# Patient Record
Sex: FEMALE | Race: WHITE | Employment: FULL TIME | URBAN - NONMETROPOLITAN AREA
[De-identification: names, ages, dates, MRNs, and addresses within clinical notes are randomized per-mention and may not be internally consistent; named-entity substitution may affect disease eponyms.]

---

## 2017-06-23 RX ORDER — ALPRAZOLAM 0.25 MG/1
0.25 TABLET ORAL PRN
Qty: 60 TABLET | Refills: 0 | Status: SHIPPED | OUTPATIENT
Start: 2017-06-23 | End: 2017-11-07 | Stop reason: SDUPTHER

## 2017-09-14 ENCOUNTER — OFFICE VISIT (OUTPATIENT)
Dept: FAMILY MEDICINE CLINIC | Age: 47
End: 2017-09-14
Payer: COMMERCIAL

## 2017-09-14 VITALS
HEART RATE: 72 BPM | DIASTOLIC BLOOD PRESSURE: 64 MMHG | HEIGHT: 67 IN | BODY MASS INDEX: 25.43 KG/M2 | WEIGHT: 162 LBS | SYSTOLIC BLOOD PRESSURE: 110 MMHG

## 2017-09-14 DIAGNOSIS — R07.9 CHEST PAIN, UNSPECIFIED TYPE: Primary | ICD-10-CM

## 2017-09-14 PROCEDURE — 93000 ELECTROCARDIOGRAM COMPLETE: CPT | Performed by: FAMILY MEDICINE

## 2017-09-14 PROCEDURE — 99213 OFFICE O/P EST LOW 20 MIN: CPT | Performed by: FAMILY MEDICINE

## 2017-09-14 ASSESSMENT — ENCOUNTER SYMPTOMS
GASTROINTESTINAL NEGATIVE: 1
RESPIRATORY NEGATIVE: 1
EYES NEGATIVE: 1
ALLERGIC/IMMUNOLOGIC NEGATIVE: 1

## 2017-09-14 ASSESSMENT — PATIENT HEALTH QUESTIONNAIRE - PHQ9
SUM OF ALL RESPONSES TO PHQ9 QUESTIONS 1 & 2: 0
SUM OF ALL RESPONSES TO PHQ QUESTIONS 1-9: 0
2. FEELING DOWN, DEPRESSED OR HOPELESS: 0
1. LITTLE INTEREST OR PLEASURE IN DOING THINGS: 0

## 2017-11-07 ENCOUNTER — OFFICE VISIT (OUTPATIENT)
Dept: FAMILY MEDICINE CLINIC | Age: 47
End: 2017-11-07
Payer: COMMERCIAL

## 2017-11-07 ENCOUNTER — HOSPITAL ENCOUNTER (OUTPATIENT)
Age: 47
Setting detail: SPECIMEN
Discharge: HOME | End: 2017-11-07
Payer: COMMERCIAL

## 2017-11-07 VITALS
HEART RATE: 68 BPM | SYSTOLIC BLOOD PRESSURE: 110 MMHG | WEIGHT: 162.04 LBS | DIASTOLIC BLOOD PRESSURE: 64 MMHG | HEIGHT: 67 IN | BODY MASS INDEX: 25.43 KG/M2

## 2017-11-07 DIAGNOSIS — Z12.4 PAP SMEAR FOR CERVICAL CANCER SCREENING: Primary | ICD-10-CM

## 2017-11-07 DIAGNOSIS — Z11.51 SCREENING FOR HUMAN PAPILLOMAVIRUS: ICD-10-CM

## 2017-11-07 DIAGNOSIS — Z11.51 SCREENING FOR HUMAN PAPILLOMAVIRUS: Primary | ICD-10-CM

## 2017-11-07 PROCEDURE — G0145 SCR C/V CYTO,THINLAYER,RESCR: HCPCS

## 2017-11-07 PROCEDURE — 99396 PREV VISIT EST AGE 40-64: CPT | Performed by: FAMILY MEDICINE

## 2017-11-07 RX ORDER — ALPRAZOLAM 0.25 MG/1
0.25 TABLET ORAL PRN
Qty: 60 TABLET | Refills: 0 | Status: SHIPPED | OUTPATIENT
Start: 2017-11-07 | End: 2018-04-25 | Stop reason: SDUPTHER

## 2017-11-07 ASSESSMENT — ENCOUNTER SYMPTOMS
RESPIRATORY NEGATIVE: 1
EYES NEGATIVE: 1
ALLERGIC/IMMUNOLOGIC NEGATIVE: 1
BACK PAIN: 1
GASTROINTESTINAL NEGATIVE: 1

## 2017-11-07 NOTE — PROGRESS NOTES
back pain (acute, this am). Skin: Negative. Allergic/Immunologic: Negative. Neurological: Negative. Hematological: Negative. Psychiatric/Behavioral: Negative. Objective:   Physical Exam   Constitutional: She is oriented to person, place, and time. She appears well-developed and well-nourished. No distress. HENT:   Head: Normocephalic and atraumatic. Right Ear: External ear normal.   Left Ear: External ear normal.   Mouth/Throat: Oropharynx is clear and moist. No oropharyngeal exudate. Eyes: Conjunctivae and EOM are normal. No scleral icterus. Neck: Neck supple. No thyromegaly present. Cardiovascular: Normal rate, regular rhythm, normal heart sounds and intact distal pulses. No murmur heard. Pulmonary/Chest: Effort normal and breath sounds normal. No respiratory distress. She has no wheezes. Right breast exhibits tenderness. Right breast exhibits no inverted nipple, no mass, no nipple discharge and no skin change. Left breast exhibits tenderness. Left breast exhibits no inverted nipple, no mass, no nipple discharge and no skin change. Abdominal: Soft. Bowel sounds are normal. She exhibits no distension. There is no tenderness. There is no rebound. Genitourinary: Uterus normal. Vaginal discharge (light, white) found. Musculoskeletal: Normal range of motion. She exhibits no edema or tenderness. Neurological: She is alert and oriented to person, place, and time. Skin: Skin is warm and dry. No rash noted. No erythema. Psychiatric: She has a normal mood and affect. Her behavior is normal. Judgment and thought content normal.     /64 (Site: Right Arm, Position: Sitting, Cuff Size: Small Adult)   Pulse 68   Ht 5' 6.93\" (1.7 m)   Wt 162 lb 0.6 oz (73.5 kg)   BMI 25.43 kg/m²     Assessment:       updated pap and pelvic  Normal exam.  Pap pending.    Plan:      Annual exam.

## 2017-11-14 LAB — CYTOLOGY REPORT: NORMAL

## 2018-03-01 ENCOUNTER — PATIENT MESSAGE (OUTPATIENT)
Dept: FAMILY MEDICINE CLINIC | Age: 48
End: 2018-03-01

## 2018-03-01 RX ORDER — CIPROFLOXACIN 500 MG/1
500 TABLET, FILM COATED ORAL 2 TIMES DAILY
Qty: 20 TABLET | Refills: 0 | Status: SHIPPED | OUTPATIENT
Start: 2018-03-01 | End: 2018-03-11

## 2018-03-01 RX ORDER — OSELTAMIVIR PHOSPHATE 75 MG/1
75 CAPSULE ORAL 2 TIMES DAILY
Qty: 10 CAPSULE | Refills: 0 | Status: SHIPPED | OUTPATIENT
Start: 2018-03-01 | End: 2018-03-06

## 2018-04-25 DIAGNOSIS — F41.9 ANXIETY: Primary | ICD-10-CM

## 2018-04-25 RX ORDER — ALPRAZOLAM 0.25 MG/1
0.25 TABLET ORAL PRN
Qty: 60 TABLET | Refills: 0 | Status: SHIPPED | OUTPATIENT
Start: 2018-04-25 | End: 2019-03-21 | Stop reason: SDUPTHER

## 2018-06-18 ENCOUNTER — OFFICE VISIT (OUTPATIENT)
Dept: PRIMARY CARE CLINIC | Age: 48
End: 2018-06-18
Payer: COMMERCIAL

## 2018-06-18 ENCOUNTER — HOSPITAL ENCOUNTER (OUTPATIENT)
Dept: GENERAL RADIOLOGY | Age: 48
Discharge: HOME | End: 2018-06-20
Payer: COMMERCIAL

## 2018-06-18 VITALS
BODY MASS INDEX: 24.8 KG/M2 | DIASTOLIC BLOOD PRESSURE: 70 MMHG | HEART RATE: 80 BPM | OXYGEN SATURATION: 98 % | SYSTOLIC BLOOD PRESSURE: 110 MMHG | WEIGHT: 158 LBS

## 2018-06-18 DIAGNOSIS — M25.512 LEFT ANTERIOR SHOULDER PAIN: Primary | ICD-10-CM

## 2018-06-18 DIAGNOSIS — M25.512 LEFT ANTERIOR SHOULDER PAIN: ICD-10-CM

## 2018-06-18 PROCEDURE — 99213 OFFICE O/P EST LOW 20 MIN: CPT | Performed by: NURSE PRACTITIONER

## 2018-06-18 PROCEDURE — 73030 X-RAY EXAM OF SHOULDER: CPT

## 2018-06-18 PROCEDURE — MISC295 DJO ARM SLING WITH SWATHE: Performed by: NURSE PRACTITIONER

## 2018-06-18 ASSESSMENT — ENCOUNTER SYMPTOMS: RESPIRATORY NEGATIVE: 1

## 2018-08-07 ENCOUNTER — HOSPITAL ENCOUNTER (OUTPATIENT)
Dept: INTERVENTIONAL RADIOLOGY/VASCULAR | Age: 48
Discharge: HOME | End: 2018-08-09
Payer: COMMERCIAL

## 2018-08-07 ENCOUNTER — OFFICE VISIT (OUTPATIENT)
Dept: PRIMARY CARE CLINIC | Age: 48
End: 2018-08-07
Payer: COMMERCIAL

## 2018-08-07 VITALS
WEIGHT: 158 LBS | SYSTOLIC BLOOD PRESSURE: 90 MMHG | TEMPERATURE: 99 F | RESPIRATION RATE: 16 BRPM | BODY MASS INDEX: 24.8 KG/M2 | DIASTOLIC BLOOD PRESSURE: 60 MMHG | HEART RATE: 72 BPM | HEIGHT: 67 IN | OXYGEN SATURATION: 97 %

## 2018-08-07 DIAGNOSIS — M79.661 RIGHT CALF PAIN: ICD-10-CM

## 2018-08-07 DIAGNOSIS — I82.431 ACUTE DEEP VEIN THROMBOSIS (DVT) OF POPLITEAL VEIN OF RIGHT LOWER EXTREMITY (HCC): Primary | ICD-10-CM

## 2018-08-07 PROCEDURE — 93971 EXTREMITY STUDY: CPT

## 2018-08-07 PROCEDURE — 99214 OFFICE O/P EST MOD 30 MIN: CPT | Performed by: FAMILY MEDICINE

## 2018-08-07 ASSESSMENT — PATIENT HEALTH QUESTIONNAIRE - PHQ9
SUM OF ALL RESPONSES TO PHQ QUESTIONS 1-9: 0
SUM OF ALL RESPONSES TO PHQ9 QUESTIONS 1 & 2: 0
1. LITTLE INTEREST OR PLEASURE IN DOING THINGS: 0
2. FEELING DOWN, DEPRESSED OR HOPELESS: 0

## 2018-08-07 NOTE — PROGRESS NOTES
Subjective:      Patient ID: Na Delatorre is a 50 y.o. female. Leg Pain    The incident occurred 12 to 24 hours ago (just started weight bearing after knee surgery a month ago. Noted pain in the posterior calf muscle ). There was no injury mechanism. The pain is present in the right leg. The quality of the pain is described as aching. The pain is moderate. The pain has been constant since onset. Pertinent negatives include no inability to bear weight, loss of motion, numbness or tingling. The symptoms are aggravated by movement, palpation and weight bearing. She has tried nothing for the symptoms. Did review patient's med list, allergies, social history,pmhx and pshx today as noted in the record. Review of Systems   Constitutional: Negative. HENT: Negative. Eyes: Negative. Respiratory: Negative. Cardiovascular: Negative. Gastrointestinal: Negative. Musculoskeletal: Positive for arthralgias, gait problem and myalgias. Skin: Negative. Neurological: Negative for tingling and numbness. Objective:   Physical Exam   Constitutional: She is oriented to person, place, and time. She appears well-developed and well-nourished. No distress. HENT:   Head: Normocephalic and atraumatic. Eyes: Conjunctivae are normal.   Neck: Normal range of motion. Pulmonary/Chest: Effort normal.   Musculoskeletal: Normal range of motion. She exhibits tenderness. She exhibits no edema. Pain with palpation to the posterior aspect of the left calf muscle down into the Achilles tendon. Pain with dorsiflexion of the foot and ankle. No edema noted to the area. Neurological: She is alert and oriented to person, place, and time. Skin: Skin is warm and dry. No rash noted. She is not diaphoretic. No erythema. No pallor. Psychiatric: She has a normal mood and affect. Her behavior is normal. Judgment and thought content normal.   Nursing note and vitals reviewed.       Assessment:      Encounter

## 2018-08-08 ASSESSMENT — ENCOUNTER SYMPTOMS
EYES NEGATIVE: 1
RESPIRATORY NEGATIVE: 1
GASTROINTESTINAL NEGATIVE: 1

## 2018-09-04 ENCOUNTER — OFFICE VISIT (OUTPATIENT)
Dept: FAMILY MEDICINE CLINIC | Age: 48
End: 2018-09-04
Payer: COMMERCIAL

## 2018-09-04 VITALS
WEIGHT: 162 LBS | HEART RATE: 68 BPM | SYSTOLIC BLOOD PRESSURE: 116 MMHG | DIASTOLIC BLOOD PRESSURE: 70 MMHG | BODY MASS INDEX: 25.43 KG/M2 | HEIGHT: 67 IN

## 2018-09-04 DIAGNOSIS — I82.431 ACUTE DEEP VEIN THROMBOSIS (DVT) OF POPLITEAL VEIN OF RIGHT LOWER EXTREMITY (HCC): Primary | ICD-10-CM

## 2018-09-04 PROCEDURE — 99214 OFFICE O/P EST MOD 30 MIN: CPT | Performed by: FAMILY MEDICINE

## 2018-09-04 RX ORDER — ESOMEPRAZOLE MAGNESIUM 40 MG/1
40 CAPSULE, DELAYED RELEASE ORAL 2 TIMES DAILY
Qty: 60 CAPSULE | Refills: 11 | Status: SHIPPED | OUTPATIENT
Start: 2018-09-04 | End: 2019-04-09 | Stop reason: ALTCHOICE

## 2018-09-04 ASSESSMENT — ENCOUNTER SYMPTOMS
ALLERGIC/IMMUNOLOGIC NEGATIVE: 1
RESPIRATORY NEGATIVE: 1
EYES NEGATIVE: 1
GASTROINTESTINAL NEGATIVE: 1

## 2018-09-04 ASSESSMENT — PATIENT HEALTH QUESTIONNAIRE - PHQ9
SUM OF ALL RESPONSES TO PHQ9 QUESTIONS 1 & 2: 0
SUM OF ALL RESPONSES TO PHQ QUESTIONS 1-9: 0
2. FEELING DOWN, DEPRESSED OR HOPELESS: 0
1. LITTLE INTEREST OR PLEASURE IN DOING THINGS: 0
SUM OF ALL RESPONSES TO PHQ QUESTIONS 1-9: 0

## 2018-09-04 NOTE — PROGRESS NOTES
ASSESSMENT/PLAN:    Encounter Diagnosis   Name Primary?  Acute deep vein thrombosis (DVT) of popliteal vein of right lower extremity (HCC) Yes      Likely that the dvt started in the first week after the surgery on review of symptoms with the patient. Started eliquis bid on 8/7/18. Not able to tell much change to the positive at present. She continues to have some swelling and mild discomfort in the knee and lower leg. Ambulating ok at present. No pitting edema or significant swelling in the extremity at present. Some popliteal tenderness without palpable cord or swelling. Plan 6 month follow up ~ first week of feb. Consider stopping eliquis at that time. An electronic signature was used to authenticate this note.     --John Tirado MD on 9/4/2018 at 10:26 AM

## 2018-10-08 ENCOUNTER — HOSPITAL ENCOUNTER (OUTPATIENT)
Dept: INTERVENTIONAL RADIOLOGY/VASCULAR | Age: 48
Discharge: HOME | End: 2018-10-10
Payer: COMMERCIAL

## 2018-10-08 ENCOUNTER — OFFICE VISIT (OUTPATIENT)
Dept: PRIMARY CARE CLINIC | Age: 48
End: 2018-10-08
Payer: COMMERCIAL

## 2018-10-08 VITALS
WEIGHT: 167 LBS | HEART RATE: 76 BPM | RESPIRATION RATE: 16 BRPM | HEIGHT: 67 IN | SYSTOLIC BLOOD PRESSURE: 100 MMHG | TEMPERATURE: 98.3 F | OXYGEN SATURATION: 96 % | BODY MASS INDEX: 26.21 KG/M2 | DIASTOLIC BLOOD PRESSURE: 70 MMHG

## 2018-10-08 DIAGNOSIS — Z86.718 ENCOUNTER FOR FOLLOW-UP OF ACUTE DEEP VEIN THROMBOSIS (DVT) OF RIGHT LOWER EXTREMITY: ICD-10-CM

## 2018-10-08 DIAGNOSIS — M79.604 RIGHT LEG PAIN: Primary | ICD-10-CM

## 2018-10-08 DIAGNOSIS — Z09 ENCOUNTER FOR FOLLOW-UP OF ACUTE DEEP VEIN THROMBOSIS (DVT) OF RIGHT LOWER EXTREMITY: ICD-10-CM

## 2018-10-08 PROCEDURE — 99214 OFFICE O/P EST MOD 30 MIN: CPT | Performed by: FAMILY MEDICINE

## 2018-10-08 PROCEDURE — 93971 EXTREMITY STUDY: CPT

## 2018-10-08 ASSESSMENT — ENCOUNTER SYMPTOMS
EYES NEGATIVE: 1
GASTROINTESTINAL NEGATIVE: 1
RESPIRATORY NEGATIVE: 1

## 2018-10-08 NOTE — PROGRESS NOTES
rest.    Return  if no improvement in symptoms or if any further symptoms arise. No Follow-up on file. An electronic signature was used to authenticate this note.     --Genna Mendez, DO on 10/8/2018 at 5:18 PM

## 2018-11-08 ENCOUNTER — OFFICE VISIT (OUTPATIENT)
Dept: FAMILY MEDICINE CLINIC | Age: 48
End: 2018-11-08
Payer: COMMERCIAL

## 2018-11-08 VITALS
SYSTOLIC BLOOD PRESSURE: 106 MMHG | BODY MASS INDEX: 25.74 KG/M2 | DIASTOLIC BLOOD PRESSURE: 64 MMHG | WEIGHT: 164 LBS | HEART RATE: 64 BPM | HEIGHT: 67 IN

## 2018-11-08 DIAGNOSIS — Z00.00 ENCOUNTER FOR PREVENTIVE HEALTH EXAMINATION: ICD-10-CM

## 2018-11-08 DIAGNOSIS — I82.431 ACUTE DEEP VEIN THROMBOSIS (DVT) OF POPLITEAL VEIN OF RIGHT LOWER EXTREMITY (HCC): ICD-10-CM

## 2018-11-08 DIAGNOSIS — F41.9 ANXIETY: Primary | ICD-10-CM

## 2018-11-08 DIAGNOSIS — Z12.31 ENCOUNTER FOR SCREENING MAMMOGRAM FOR BREAST CANCER: Primary | ICD-10-CM

## 2018-11-08 PROCEDURE — 99396 PREV VISIT EST AGE 40-64: CPT | Performed by: FAMILY MEDICINE

## 2018-11-08 ASSESSMENT — PATIENT HEALTH QUESTIONNAIRE - PHQ9
1. LITTLE INTEREST OR PLEASURE IN DOING THINGS: 0
2. FEELING DOWN, DEPRESSED OR HOPELESS: 0
SUM OF ALL RESPONSES TO PHQ QUESTIONS 1-9: 0
SUM OF ALL RESPONSES TO PHQ QUESTIONS 1-9: 0
SUM OF ALL RESPONSES TO PHQ9 QUESTIONS 1 & 2: 0

## 2018-11-08 ASSESSMENT — ENCOUNTER SYMPTOMS
RESPIRATORY NEGATIVE: 1
GASTROINTESTINAL NEGATIVE: 1
EYES NEGATIVE: 1
ALLERGIC/IMMUNOLOGIC NEGATIVE: 1

## 2018-12-10 ENCOUNTER — PATIENT MESSAGE (OUTPATIENT)
Dept: FAMILY MEDICINE CLINIC | Age: 48
End: 2018-12-10

## 2018-12-10 DIAGNOSIS — I82.4Z1 DEEP VEIN THROMBOSIS (DVT) OF DISTAL VEIN OF RIGHT LOWER EXTREMITY, UNSPECIFIED CHRONICITY (HCC): Primary | ICD-10-CM

## 2018-12-10 NOTE — TELEPHONE ENCOUNTER
From: Alia Reyes  To: Willie Osler, MD  Sent: 12/10/2018 9:29 AM EST  Subject: Non-Urgent Medical Question    Miriam, having pain behind my knee and swelling in my calf again with warm to the touch area. Can Dr. Emy Engle just order a ultrasound for me or does he need to see me? Either way, let me know.   Thanks,  Tereza Richard

## 2018-12-13 ENCOUNTER — HOSPITAL ENCOUNTER (OUTPATIENT)
Dept: INTERVENTIONAL RADIOLOGY/VASCULAR | Age: 48
Discharge: HOME | End: 2018-12-15
Payer: COMMERCIAL

## 2018-12-13 DIAGNOSIS — I82.4Z1 DEEP VEIN THROMBOSIS (DVT) OF DISTAL VEIN OF RIGHT LOWER EXTREMITY, UNSPECIFIED CHRONICITY (HCC): ICD-10-CM

## 2018-12-13 PROCEDURE — 93971 EXTREMITY STUDY: CPT

## 2019-03-21 ENCOUNTER — PATIENT MESSAGE (OUTPATIENT)
Dept: FAMILY MEDICINE CLINIC | Age: 49
End: 2019-03-21

## 2019-03-21 DIAGNOSIS — F41.9 ANXIETY: Primary | ICD-10-CM

## 2019-03-21 RX ORDER — ALPRAZOLAM 0.25 MG/1
0.25 TABLET ORAL PRN
Qty: 60 TABLET | Refills: 0 | Status: SHIPPED | OUTPATIENT
Start: 2019-03-21 | End: 2019-05-22 | Stop reason: SDUPTHER

## 2019-03-27 ENCOUNTER — HOSPITAL ENCOUNTER (OUTPATIENT)
Age: 49
Setting detail: SPECIMEN
Discharge: HOME | End: 2019-03-27
Payer: COMMERCIAL

## 2019-03-27 ENCOUNTER — OFFICE VISIT (OUTPATIENT)
Dept: PRIMARY CARE CLINIC | Age: 49
End: 2019-03-27
Payer: COMMERCIAL

## 2019-03-27 VITALS
TEMPERATURE: 98 F | DIASTOLIC BLOOD PRESSURE: 74 MMHG | HEART RATE: 74 BPM | OXYGEN SATURATION: 98 % | SYSTOLIC BLOOD PRESSURE: 112 MMHG

## 2019-03-27 DIAGNOSIS — N30.00 ACUTE CYSTITIS WITHOUT HEMATURIA: ICD-10-CM

## 2019-03-27 DIAGNOSIS — M54.9 BACK PAIN, UNSPECIFIED BACK LOCATION, UNSPECIFIED BACK PAIN LATERALITY, UNSPECIFIED CHRONICITY: Primary | ICD-10-CM

## 2019-03-27 DIAGNOSIS — M54.9 BACK PAIN, UNSPECIFIED BACK LOCATION, UNSPECIFIED BACK PAIN LATERALITY, UNSPECIFIED CHRONICITY: ICD-10-CM

## 2019-03-27 LAB
-: ABNORMAL
AMORPHOUS: ABNORMAL
BACTERIA: ABNORMAL
BILIRUBIN URINE: NEGATIVE
CASTS UA: ABNORMAL /LPF (ref 0–2)
COLOR: ABNORMAL
COMMENT UA: ABNORMAL
CRYSTALS, UA: ABNORMAL /HPF
EPITHELIAL CELLS UA: ABNORMAL /HPF (ref 0–5)
GLUCOSE URINE: NEGATIVE
KETONES, URINE: NEGATIVE
LEUKOCYTE ESTERASE, URINE: NEGATIVE
MUCUS: ABNORMAL
NITRITE, URINE: NEGATIVE
OTHER OBSERVATIONS UA: ABNORMAL
PH UA: 5.5 (ref 5–6)
PROTEIN UA: NEGATIVE
RBC UA: ABNORMAL /HPF (ref 0–4)
RENAL EPITHELIAL, UA: ABNORMAL /HPF
SPECIFIC GRAVITY UA: 1.03 (ref 1.01–1.02)
TRICHOMONAS: ABNORMAL
TURBIDITY: ABNORMAL
URINE HGB: NEGATIVE
UROBILINOGEN, URINE: NORMAL
WBC UA: ABNORMAL /HPF (ref 0–4)
YEAST: ABNORMAL

## 2019-03-27 PROCEDURE — 87186 SC STD MICRODIL/AGAR DIL: CPT

## 2019-03-27 PROCEDURE — 87088 URINE BACTERIA CULTURE: CPT

## 2019-03-27 PROCEDURE — 99213 OFFICE O/P EST LOW 20 MIN: CPT | Performed by: PHYSICIAN ASSISTANT

## 2019-03-27 PROCEDURE — 87086 URINE CULTURE/COLONY COUNT: CPT

## 2019-03-27 PROCEDURE — 81001 URINALYSIS AUTO W/SCOPE: CPT

## 2019-03-27 RX ORDER — CIPROFLOXACIN 500 MG/1
500 TABLET, FILM COATED ORAL 2 TIMES DAILY
Qty: 14 TABLET | Refills: 0 | Status: SHIPPED | OUTPATIENT
Start: 2019-03-27 | End: 2019-04-03

## 2019-03-27 RX ORDER — METOPROLOL SUCCINATE 25 MG/1
25 TABLET, EXTENDED RELEASE ORAL DAILY
COMMUNITY

## 2019-03-27 ASSESSMENT — ENCOUNTER SYMPTOMS
DIARRHEA: 0
BACK PAIN: 1
VOMITING: 0
NAUSEA: 0
RESPIRATORY NEGATIVE: 1

## 2019-03-27 ASSESSMENT — PATIENT HEALTH QUESTIONNAIRE - PHQ9
1. LITTLE INTEREST OR PLEASURE IN DOING THINGS: 0
2. FEELING DOWN, DEPRESSED OR HOPELESS: 0
SUM OF ALL RESPONSES TO PHQ9 QUESTIONS 1 & 2: 0
SUM OF ALL RESPONSES TO PHQ QUESTIONS 1-9: 0
SUM OF ALL RESPONSES TO PHQ QUESTIONS 1-9: 0

## 2019-03-29 LAB
CULTURE: ABNORMAL
Lab: ABNORMAL
SPECIMEN DESCRIPTION: ABNORMAL

## 2019-04-03 ENCOUNTER — PATIENT MESSAGE (OUTPATIENT)
Dept: PRIMARY CARE CLINIC | Age: 49
End: 2019-04-03

## 2019-04-03 NOTE — TELEPHONE ENCOUNTER
From: Lesly Mckeon  To: SARIAH Barone  Sent: 4/3/2019 8:13 AM EDT  Subject: Visit Follow-Up Question    The Cipro you prescribed did not fix the issue I am having. I believe it may be more of a vaginal situation. Do I need to be seen again or can you prescribe something for bacterial vaginosis? Feel free to call me at [phone] or [phone]. Thanks!

## 2019-04-03 NOTE — TELEPHONE ENCOUNTER
From: Romero Valencia  To: SARIAH Washington  Sent: 4/3/2019 1:36 PM EDT  Subject: Visit Follow-Up Question    Same burning when urinating (although now coming from vulva), back pain, slight discharge (not white), frequency of urination is gone and pain in upper right side is gone.   ----- Message -----  From: SARIAH Washington  Sent: 4/3/2019 12:17 PM EDT  To: Romero Valencia  Subject: RE: Visit Follow-Up Question  What kind of symptoms are you having now? May need to be seen. I will have my nurse call you and see what is going on. Thanks, Radames Dillon    ----- Message -----   From: Romero Valencia   Sent: 4/3/2019 8:13 AM EDT   To: SARIAH Washington  Subject: Visit Follow-Up Question    The Cipro you prescribed did not fix the issue I am having. I believe it may be more of a vaginal situation. Do I need to be seen again or can you prescribe something for bacterial vaginosis? Feel free to call me at [phone] or [phone]. Thanks!

## 2019-04-04 ENCOUNTER — OFFICE VISIT (OUTPATIENT)
Dept: FAMILY MEDICINE CLINIC | Age: 49
End: 2019-04-04
Payer: COMMERCIAL

## 2019-04-04 ENCOUNTER — HOSPITAL ENCOUNTER (OUTPATIENT)
Age: 49
Setting detail: SPECIMEN
Discharge: HOME | End: 2019-04-04
Payer: COMMERCIAL

## 2019-04-04 VITALS
DIASTOLIC BLOOD PRESSURE: 56 MMHG | OXYGEN SATURATION: 95 % | HEART RATE: 72 BPM | BODY MASS INDEX: 28.28 KG/M2 | WEIGHT: 180.2 LBS | HEIGHT: 67 IN | SYSTOLIC BLOOD PRESSURE: 96 MMHG

## 2019-04-04 DIAGNOSIS — N76.2 ACUTE VULVITIS: ICD-10-CM

## 2019-04-04 DIAGNOSIS — R10.2 PELVIC PAIN: ICD-10-CM

## 2019-04-04 DIAGNOSIS — N83.201 CYSTS OF BOTH OVARIES: ICD-10-CM

## 2019-04-04 DIAGNOSIS — R30.0 BURNING WITH URINATION: Primary | ICD-10-CM

## 2019-04-04 DIAGNOSIS — N83.202 CYSTS OF BOTH OVARIES: ICD-10-CM

## 2019-04-04 DIAGNOSIS — R30.0 BURNING WITH URINATION: ICD-10-CM

## 2019-04-04 LAB
-: NORMAL
AMORPHOUS: NORMAL
BACTERIA: NORMAL
BILIRUBIN URINE: NEGATIVE
CASTS UA: NORMAL /LPF (ref 0–2)
COLOR: ABNORMAL
COMMENT UA: ABNORMAL
CRYSTALS, UA: NORMAL /HPF
EPITHELIAL CELLS UA: NORMAL /HPF (ref 0–5)
GLUCOSE URINE: NEGATIVE
KETONES, URINE: NEGATIVE
LEUKOCYTE ESTERASE, URINE: NEGATIVE
MUCUS: NORMAL
NITRITE, URINE: NEGATIVE
OTHER OBSERVATIONS UA: NORMAL
PH UA: 6 (ref 5–6)
PROTEIN UA: NEGATIVE
RBC UA: NORMAL /HPF (ref 0–4)
RENAL EPITHELIAL, UA: NORMAL /HPF
SPECIFIC GRAVITY UA: 1 (ref 1.01–1.02)
TRICHOMONAS: NORMAL
TURBIDITY: ABNORMAL
URINE HGB: NEGATIVE
UROBILINOGEN, URINE: NORMAL
WBC UA: NORMAL /HPF (ref 0–4)
YEAST: NORMAL

## 2019-04-04 PROCEDURE — 81001 URINALYSIS AUTO W/SCOPE: CPT

## 2019-04-04 PROCEDURE — 87086 URINE CULTURE/COLONY COUNT: CPT

## 2019-04-04 PROCEDURE — 87070 CULTURE OTHR SPECIMN AEROBIC: CPT

## 2019-04-04 PROCEDURE — 99213 OFFICE O/P EST LOW 20 MIN: CPT | Performed by: PHYSICIAN ASSISTANT

## 2019-04-04 RX ORDER — FLUCONAZOLE 150 MG/1
TABLET ORAL
Qty: 3 TABLET | Refills: 0 | Status: SHIPPED | OUTPATIENT
Start: 2019-04-04 | End: 2019-04-09 | Stop reason: ALTCHOICE

## 2019-04-04 SDOH — HEALTH STABILITY: MENTAL HEALTH: HOW OFTEN DO YOU HAVE A DRINK CONTAINING ALCOHOL?: MONTHLY OR LESS

## 2019-04-04 SDOH — HEALTH STABILITY: MENTAL HEALTH: HOW MANY STANDARD DRINKS CONTAINING ALCOHOL DO YOU HAVE ON A TYPICAL DAY?: 1 OR 2

## 2019-04-04 ASSESSMENT — PATIENT HEALTH QUESTIONNAIRE - PHQ9
SUM OF ALL RESPONSES TO PHQ9 QUESTIONS 1 & 2: 0
2. FEELING DOWN, DEPRESSED OR HOPELESS: 0
SUM OF ALL RESPONSES TO PHQ QUESTIONS 1-9: 0
1. LITTLE INTEREST OR PLEASURE IN DOING THINGS: 0
SUM OF ALL RESPONSES TO PHQ QUESTIONS 1-9: 0

## 2019-04-04 ASSESSMENT — ENCOUNTER SYMPTOMS
BACK PAIN: 1
GASTROINTESTINAL NEGATIVE: 1
RESPIRATORY NEGATIVE: 1

## 2019-04-04 NOTE — PROGRESS NOTES
Subjective:      Patient ID: Miri Cast is a 50 y.o. female. Patient is seen following up on UTI I saw her last week for this and she completed the cipro it did help her initial symptoms. Now having vaginal sx burning. A slight discharge. No odor or itching. Still back pain. Lower abdominal cramping noted today. Review of Systems   Constitutional: Negative for chills and fever. Respiratory: Negative. Cardiovascular: Negative. Gastrointestinal: Negative. Genitourinary: Negative for difficulty urinating, dysuria, frequency and hematuria. Had an endometrial ablation 8-9 years ago and no bleeding since. Musculoskeletal: Positive for back pain. The real low back hurts. The flank pain she had last week improved with cipro. No trauma. Psychiatric/Behavioral:        The back pain awakens her along with the lower abdominal cramping. Objective:   Physical Exam   Constitutional: She is oriented to person, place, and time. She appears well-developed and well-nourished. No distress. HENT:   Head: Normocephalic and atraumatic. Nose: Nose normal.   Eyes: No scleral icterus. Pulmonary/Chest: Effort normal.   Abdominal: Soft. Bowel sounds are normal. She exhibits no distension and no mass. There is no tenderness. There is no rebound and no guarding. No hernia. Genitourinary: Vagina normal and uterus normal. Rectal exam shows guaiac negative stool. Genitourinary Comments: Genitalia nl female slight erythema noted at the forchette area. Urethra nl. No vaginal or cervical lesions noted. Scant white discharge noted in the vaginal vault. No cervical motion tenderness noted. No adnexal tenderness or mass. No erythema noted of the labia majora. Musculoskeletal: She exhibits tenderness. She exhibits no edema. Mild tenderness with palpation over the LS spine and spasm noted in the LS paraspinous regions.    Neurological: She is alert and oriented to person, place, and time. Skin: Skin is warm and dry. Capillary refill takes less than 2 seconds. No rash noted. No erythema. Psychiatric: She has a normal mood and affect. Nursing note and vitals reviewed. Hospital Outpatient Visit on 04/04/2019   Component Date Value Ref Range Status    Color, UA 04/04/2019 NOT REPORTED  YELLOW Final    Turbidity UA 04/04/2019 NOT REPORTED  CLEAR Final    Glucose, Ur 04/04/2019 NEGATIVE  NEGATIVE Final    Bilirubin Urine 04/04/2019 NEGATIVE  NEGATIVE Final    Ketones, Urine 04/04/2019 NEGATIVE  NEGATIVE Final    Specific Gravity, UA 04/04/2019 1.005* 1.010 - 1.025 Final    Urine Hgb 04/04/2019 NEGATIVE  NEGATIVE Final    pH, UA 04/04/2019 6.0  5.0 - 6.0 Final    Protein, UA 04/04/2019 NEGATIVE  NEGATIVE Final    Urobilinogen, Urine 04/04/2019 Normal  Normal Final    Nitrite, Urine 04/04/2019 NEGATIVE  NEGATIVE Final    Leukocyte Esterase, Urine 04/04/2019 NEGATIVE  NEGATIVE Final    Urinalysis Comments 04/04/2019 NOT REPORTED   Final    - 04/04/2019        Final    WBC, UA 04/04/2019 None  0 - 4 /HPF Final    RBC, UA 04/04/2019 None  0 - 4 /HPF Final    Casts UA 04/04/2019 NOT REPORTED  0 - 2 /LPF Final    Crystals UA 04/04/2019 NOT REPORTED  None /HPF Final    Epithelial Cells UA 04/04/2019 0 TO 4  0 - 5 /HPF Final    Renal Epithelial, Urine 04/04/2019 NOT REPORTED  0 /HPF Final    Bacteria, UA 04/04/2019 None  None Final    Mucus, UA 04/04/2019 NOT REPORTED  None Final    Trichomonas, UA 04/04/2019 NOT REPORTED  None Final    Amorphous, UA 04/04/2019 NOT REPORTED  None Final    Other Observations UA 04/04/2019 NOT REPORTED  NOT REQ. Final    Yeast, UA 04/04/2019 NOT REPORTED  None Final    Specimen Description 04/04/2019 . CLEAN CATCH URINE   Final    Special Requests 04/04/2019 NOT REPORTED   Final    Culture 04/04/2019 NO GROWTH   Final    Specimen Description 04/04/2019 . VAGINA   Final    Special Requests 04/04/2019 NOT REPORTED   Final    Culture 04/04/2019 NORMAL URO-GENITAL DENA   Final    Culture 04/04/2019 NEGATIVE FOR NEISSERIA GONORRHOEAE   Final    Culture 04/04/2019 NEGATIVE FOR GROUP B STREPTOCOCCI   Final   Hospital Outpatient Visit on 03/27/2019   Component Date Value Ref Range Status    Color, UA 03/27/2019 NOT REPORTED  YELLOW Final    Turbidity UA 03/27/2019 NOT REPORTED  CLEAR Final    Glucose, Ur 03/27/2019 NEGATIVE  NEGATIVE Final    Bilirubin Urine 03/27/2019 NEGATIVE  NEGATIVE Final    Ketones, Urine 03/27/2019 NEGATIVE  NEGATIVE Final    Specific Gravity, UA 03/27/2019 1.030* 1.010 - 1.025 Final    Urine Hgb 03/27/2019 NEGATIVE  NEGATIVE Final    pH, UA 03/27/2019 5.5  5.0 - 6.0 Final    Protein, UA 03/27/2019 NEGATIVE  NEGATIVE Final    Urobilinogen, Urine 03/27/2019 Normal  Normal Final    Nitrite, Urine 03/27/2019 NEGATIVE  NEGATIVE Final    Leukocyte Esterase, Urine 03/27/2019 NEGATIVE  NEGATIVE Final    Urinalysis Comments 03/27/2019 NOT REPORTED   Final    - 03/27/2019        Final    WBC, UA 03/27/2019 0 TO 4  0 - 4 /HPF Final    RBC, UA 03/27/2019 None  0 - 4 /HPF Final    Casts UA 03/27/2019 NOT REPORTED  0 - 2 /LPF Final    Crystals UA 03/27/2019 NOT REPORTED  None /HPF Final    Epithelial Cells UA 03/27/2019 5 TO 10  0 - 5 /HPF Final    Renal Epithelial, Urine 03/27/2019 NOT REPORTED  0 /HPF Final    Bacteria, UA 03/27/2019 3+* None Final    Mucus, UA 03/27/2019 NOT REPORTED  None Final    Trichomonas, UA 03/27/2019 NOT REPORTED  None Final    Amorphous, UA 03/27/2019 NOT REPORTED  None Final    Other Observations UA 03/27/2019 NOT REPORTED  NOT REQ. Final    Yeast, UA 03/27/2019 NOT REPORTED  None Final    Specimen Description 03/27/2019 . CLEAN CATCH URINE   Final    Special Requests 03/27/2019 NOT REPORTED   Final    Culture 03/27/2019 *  Final                    Value:ESCHERICHIA COLI  10 to 50,000 CFU/ML           Assessment:       Diagnosis Orders   1.  Burning with urination  Urinalysis Reflex to Culture    Urine Culture   2. Acute vulvitis  Urine Culture    Culture, Genital    DISCONTINUED: fluconazole (DIFLUCAN) 150 MG tablet   3. Cysts of both ovaries  US PELVIS COMPLETE    US NON OB TRANSVAGINAL    Urine Culture    Culture, Genital   4.  Pelvic pain  US PELVIS COMPLETE    US NON OB TRANSVAGINAL    Culture, Genital           Plan:      She will be notified of all results  Further treatment based on results  Salt water baths discussed  Answered her questions  Follow-up not improving or worsens  Heat to the back home exercises  I will check on her in a few days to ensure she is improving        SARIAH Green

## 2019-04-05 LAB
CULTURE: NO GROWTH
Lab: NORMAL
SPECIMEN DESCRIPTION: NORMAL

## 2019-04-07 LAB
CULTURE: NORMAL
Lab: NORMAL
SPECIMEN DESCRIPTION: NORMAL

## 2019-04-09 ENCOUNTER — OFFICE VISIT (OUTPATIENT)
Dept: FAMILY MEDICINE CLINIC | Age: 49
End: 2019-04-09
Payer: COMMERCIAL

## 2019-04-09 VITALS
SYSTOLIC BLOOD PRESSURE: 100 MMHG | BODY MASS INDEX: 27.94 KG/M2 | DIASTOLIC BLOOD PRESSURE: 62 MMHG | HEART RATE: 62 BPM | HEIGHT: 67 IN | WEIGHT: 178 LBS

## 2019-04-09 DIAGNOSIS — I82.431 ACUTE DEEP VEIN THROMBOSIS (DVT) OF POPLITEAL VEIN OF RIGHT LOWER EXTREMITY (HCC): ICD-10-CM

## 2019-04-09 DIAGNOSIS — I47.29 VENTRICULAR TACHYCARDIA, INDUCIBLE: ICD-10-CM

## 2019-04-09 PROBLEM — I82.439 DVT OF POPLITEAL VEIN (HCC): Status: ACTIVE | Noted: 2018-08-01

## 2019-04-09 PROCEDURE — 99214 OFFICE O/P EST MOD 30 MIN: CPT | Performed by: FAMILY MEDICINE

## 2019-04-09 ASSESSMENT — ENCOUNTER SYMPTOMS
ALLERGIC/IMMUNOLOGIC NEGATIVE: 1
RESPIRATORY NEGATIVE: 1
GASTROINTESTINAL NEGATIVE: 1
SHORTNESS OF BREATH: 0
EYES NEGATIVE: 1

## 2019-04-09 NOTE — PROGRESS NOTES
2019     Miles Carrasco (:  1970) is a 50 y.o. female, here for evaluation of the following medical concerns:    HPI   Acute follow up for recent defibrillator placement. 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 She passed out in the parking lot and hit her head. She is on eliquis and decided to go the ER to get her head checked. Her EKG was concerning for NSTEMI, with normal enzymes. She felt fine, without symptoms. She was transferred to [facility] and had a cath with normal coronaries. EP study with inducible nonsustained and sustained monomorphic ventricular tachycardia of multiple different morphologies. Left bundle left superior axis right bundle ,  Sustained VT RV OT. She has not had any further syncope events. No defibrillator shocks. Following with cardiology for icd checks. Questioning whether she needs to cont. Eliquis. Past Medical History:   Diagnosis Date    Anxiety     Reflux      Past Surgical History:   Procedure Laterality Date    CHOLECYSTECTOMY  2010    COLONOSCOPY  2018    [facility]. Pending sale to [facility], single polyp cecum, diverticuli , follow up 5 years. Tamia Christopher Masters   fibroid/bleeding    KNEE SURGERY  2016    KNEE SURGERY Right 2018    rt knee scope, scar debridement, MMD, patellofemoral ligament repair, microfracture of medial femoral condyle    LAPAROSCOPY  2012    Exploratory; lysis of adhesions.  UPPER GASTROINTESTINAL ENDOSCOPY  2012    Grade 2 esophagitis.  UPPER GASTROINTESTINAL ENDOSCOPY  2010    small hiatal hernia         Review of Systems   Constitutional: Negative. HENT: Negative. Eyes: Negative. Respiratory: Negative. Negative for shortness of breath. Cardiovascular: Negative. Negative for chest pain and palpitations. Gastrointestinal: Negative. Endocrine: Negative. Genitourinary: Negative. Musculoskeletal: Negative. Skin: Negative. Allergic/Immunologic: Negative. Neurological: Negative. Negative for syncope. Hematological: Negative. Does not bruise/bleed easily. Psychiatric/Behavioral: Negative. Prior to Visit Medications    Medication Sig Taking? Authorizing Provider   metoprolol succinate (TOPROL XL) 25 MG extended release tablet Take 25 mg by mouth daily Yes Historical Provider, MD   ALPRAZolam (XANAX) 0.25 MG tablet Take 1 tablet by mouth as needed for Anxiety for up to 30 days. Yes Cindi Thorne MD   esomeprazole (NEXIUM) 40 MG delayed release capsule Take 1 capsule by mouth 2 times daily Yes Cindi Thorne MD   apixaban (ELIQUIS) 5 MG TABS tablet Take 1 tablet by mouth 2 times daily Yes Anisha Wu DO   Alum Hydroxide-Mag Carbonate (GAVISCON PO) Take 2 tablets by mouth nightly. Yes Historical Provider, MD        Social History     Tobacco Use    Smoking status: Former Smoker     Packs/day: 1.00     Years: 25.00     Pack years: 25.00     Types: Cigarettes     Start date: 1985     Last attempt to quit: 2019     Years since quittin.2    Smokeless tobacco: Never Used   Substance Use Topics    Alcohol use: Yes     Alcohol/week: 0.0 oz     Frequency: Monthly or less     Drinks per session: 1 or 2     Binge frequency: Never     Comment: occasional        Vitals:    19 1122   BP: 100/62   Site: Left Upper Arm   Position: Sitting   Cuff Size: Large Adult   Pulse: 62   Weight: 178 lb (80.7 kg)   Height: 5' 7.01\" (1.702 m)     Estimated body mass index is 27.87 kg/m² as calculated from the following:    Height as of this encounter: 5' 7.01\" (1.702 m). Weight as of this encounter: 178 lb (80.7 kg). Physical Exam   Constitutional: She is oriented to person, place, and time. She appears well-developed and well-nourished. No distress. HENT:   Head: Normocephalic and atraumatic.    Right Ear: External ear normal.   Left Ear: External ear normal.   Mouth/Throat: Oropharynx is clear and moist. No oropharyngeal exudate. Eyes: Conjunctivae and EOM are normal. No scleral icterus. Neck: Neck supple. No thyromegaly present. Cardiovascular: Normal rate, regular rhythm, normal heart sounds and intact distal pulses. No murmur heard. Pulmonary/Chest: Effort normal and breath sounds normal. No respiratory distress. She has no wheezes. Abdominal: Soft. Bowel sounds are normal. She exhibits no distension. There is no tenderness. There is no rebound. Musculoskeletal: Normal range of motion. She exhibits no edema or tenderness. Neurological: She is alert and oriented to person, place, and time. Skin: Skin is warm and dry. No rash noted. No erythema. Psychiatric: She has a normal mood and affect. Her behavior is normal. Judgment and thought content normal.     /62 (Site: Left Upper Arm, Position: Sitting, Cuff Size: Large Adult)   Pulse 62   Ht 5' 7.01\" (1.702 m)   Wt 178 lb (80.7 kg)   BMI 27.87 kg/m²     ASSESSMENT/PLAN:  Monomorphic VT: finding on EP study at [facility] after syncope episode. Now with ICD. Doing well. Cont. Routine device checks. [address] SINGLE CHAMBER ICD. MODE VVI 40. Reportedly MRI safe. Acute DVT, likely induced, after knee surgery august 2018 after knee surgery. Plan to stop  eliquis after > 7 months on it. No complications. Leg normal without swelling or pain. An electronic signature was used to authenticate this note.     --Jose Zuniga MD on 4/9/2019 at 12:05 PM

## 2019-04-18 ENCOUNTER — OFFICE VISIT (OUTPATIENT)
Dept: PRIMARY CARE CLINIC | Age: 49
End: 2019-04-18
Payer: COMMERCIAL

## 2019-04-18 ENCOUNTER — HOSPITAL ENCOUNTER (OUTPATIENT)
Dept: INTERVENTIONAL RADIOLOGY/VASCULAR | Age: 49
Discharge: HOME | End: 2019-04-20
Payer: COMMERCIAL

## 2019-04-18 VITALS
OXYGEN SATURATION: 98 % | HEART RATE: 80 BPM | TEMPERATURE: 99 F | BODY MASS INDEX: 28.41 KG/M2 | RESPIRATION RATE: 18 BRPM | HEIGHT: 67 IN | DIASTOLIC BLOOD PRESSURE: 62 MMHG | SYSTOLIC BLOOD PRESSURE: 96 MMHG | WEIGHT: 181 LBS

## 2019-04-18 DIAGNOSIS — M79.604 PAIN OF RIGHT LOWER EXTREMITY: Primary | ICD-10-CM

## 2019-04-18 DIAGNOSIS — M79.604 PAIN OF RIGHT LOWER EXTREMITY: ICD-10-CM

## 2019-04-18 PROCEDURE — 99213 OFFICE O/P EST LOW 20 MIN: CPT | Performed by: NURSE PRACTITIONER

## 2019-04-18 PROCEDURE — 93971 EXTREMITY STUDY: CPT

## 2019-04-18 RX ORDER — ESOMEPRAZOLE MAGNESIUM 40 MG/1
CAPSULE, DELAYED RELEASE ORAL
COMMUNITY
Start: 2019-04-12 | End: 2020-05-18

## 2019-04-18 ASSESSMENT — ENCOUNTER SYMPTOMS
SHORTNESS OF BREATH: 0
RESPIRATORY NEGATIVE: 1

## 2019-04-18 NOTE — PROGRESS NOTES
Tomy Klein  4/18/19       Chief Complaint   Patient presents with    Leg Pain     back of right knee pain, sx since 4/16/2019       HPI: Onset of right leg pain, onset on Tuesday. She has a hx of a DVT in August 2018 s/p knee surgery. She was previously on Eliquis, but is no longer on it. She recently has been in the car for 2 10 hour drives, returning home yesterday. The pain is behind her right knee, feels similar to her last DVT. She had some leftover Eliquis, and took one yesterday am and evening, and one this am. (5 mg each)    Past MedHx:     Past Medical History:   Diagnosis Date    Anxiety     DVT of popliteal vein ([address] 08/2018    right leg, following knee surgery    Reflux     Ventricular tachycardia, inducible ([facility] [address] 01/18/2019    syncope->abnormal ekg/ischemia?->normal cath. ->ep with sustained/non sustained/inducible monomorphic VT [facility]        Past Surgical History:   Procedure Laterality Date    CARDIAC DEFIBRILLATOR PLACEMENT Left 01/2019    [facility][1].  sustained VT, non sustained/inducible VT on EP study.  CHOLECYSTECTOMY  12/23/2010    COLONOSCOPY  06/04/2018    [place]. [facility], single polyp cecum, diverticuli , follow up 5 years. Eusebia Salguero   fibroid/bleeding    KNEE SURGERY  11/14/2016    KNEE SURGERY Right 07/02/2018    rt knee scope, scar debridement, MMD, patellofemoral ligament repair, microfracture of medial femoral condyle    LAPAROSCOPY  09/12/2012    Exploratory; lysis of adhesions.  UPPER GASTROINTESTINAL ENDOSCOPY  07/23/2012    Grade 2 esophagitis.     UPPER GASTROINTESTINAL ENDOSCOPY  12/17/2010    small hiatal hernia       Social Hx:     Social History     Socioeconomic History    Marital status:      Spouse name: Not on file    Number of children: Not on file    Years of education: Not on file    Highest education level: Not on file 05/11/2015    ALKPHOS 45 05/11/2015    AST 14 05/11/2015    ALT 20 05/11/2015    LABGLOM >60 05/11/2015    GFRAA >60 05/11/2015       Outpatient Encounter Medications as of 4/18/2019   Medication Sig Dispense Refill    esomeprazole (NEXIUM) 40 MG delayed release capsule       metoprolol succinate (TOPROL XL) 25 MG extended release tablet Take 25 mg by mouth daily      ALPRAZolam (XANAX) 0.25 MG tablet Take 1 tablet by mouth as needed for Anxiety for up to 30 days. 60 tablet 0    apixaban (ELIQUIS) 5 MG TABS tablet Take 1 tablet by mouth 2 times daily 60 tablet 5    Alum Hydroxide-Mag Carbonate (GAVISCON PO) Take 2 tablets by mouth nightly. No facility-administered encounter medications on file as of 4/18/2019. Review of Systems   Constitutional: Negative. HENT: Negative. Respiratory: Negative. Negative for shortness of breath. Cardiovascular: Negative. Negative for chest pain. Musculoskeletal: Positive for arthralgias. Physical Exam   Constitutional: She appears well-developed and well-nourished. HENT:   Head: Normocephalic. Cardiovascular: Normal rate and regular rhythm. Pulmonary/Chest: Effort normal and breath sounds normal.   Musculoskeletal:        Legs:  Posterior lateral aspect right leg pain. No swelling, no erythema, no warmth to touch. Negative homans sign. Data reviewed:      ASSESSMENT:   Diagnosis Orders   1. Pain of right lower extremity  VL DUP LOWER EXTREMITY VENOUS RIGHT       PLAN:  Return if symptoms worsen or fail to improve. No orders of the defined types were placed in this encounter.     US negative for DVT  Supportive measures discussed  Follow up if no improvement    Electronically signed by Larry Pallas, APRN - CNP on 4/18/19 at 9:24 AM

## 2019-05-22 ENCOUNTER — PATIENT MESSAGE (OUTPATIENT)
Dept: FAMILY MEDICINE CLINIC | Age: 49
End: 2019-05-22

## 2019-05-22 DIAGNOSIS — F41.9 ANXIETY: ICD-10-CM

## 2019-05-22 RX ORDER — ALPRAZOLAM 0.25 MG/1
0.25 TABLET ORAL PRN
Qty: 30 TABLET | Refills: 0 | Status: SHIPPED | OUTPATIENT
Start: 2019-05-22 | End: 2019-08-15 | Stop reason: SDUPTHER

## 2019-05-22 NOTE — TELEPHONE ENCOUNTER
From: Bill Valencia  To: Enid Mares MD  Sent: 5/22/2019 3:42 PM EDT  Subject: Prescription Question    Miriam, I need a refill on my Xanax. Can you call this in to [address]?   Thanks,  Matthew Sánchez

## 2019-06-24 ENCOUNTER — OFFICE VISIT (OUTPATIENT)
Dept: PRIMARY CARE CLINIC | Age: 49
End: 2019-06-24
Payer: COMMERCIAL

## 2019-06-24 VITALS
DIASTOLIC BLOOD PRESSURE: 68 MMHG | TEMPERATURE: 98 F | OXYGEN SATURATION: 98 % | HEART RATE: 74 BPM | SYSTOLIC BLOOD PRESSURE: 122 MMHG

## 2019-06-24 DIAGNOSIS — H60.503 ACUTE OTITIS EXTERNA OF BOTH EARS, UNSPECIFIED TYPE: Primary | ICD-10-CM

## 2019-06-24 PROCEDURE — 99213 OFFICE O/P EST LOW 20 MIN: CPT | Performed by: NURSE PRACTITIONER

## 2019-06-24 RX ORDER — CIPROFLOXACIN AND DEXAMETHASONE 3; 1 MG/ML; MG/ML
3 SUSPENSION/ DROPS AURICULAR (OTIC) 2 TIMES DAILY
Qty: 1 BOTTLE | Refills: 0 | Status: SHIPPED | OUTPATIENT
Start: 2019-06-24 | End: 2019-07-01

## 2019-06-24 ASSESSMENT — ENCOUNTER SYMPTOMS
COUGH: 0
RHINORRHEA: 0
VOMITING: 0
SORE THROAT: 0
DIARRHEA: 0
RESPIRATORY NEGATIVE: 1
GASTROINTESTINAL NEGATIVE: 1

## 2019-06-24 NOTE — PROGRESS NOTES
[facility]             [address], [address]                        Telephone [phone]             Fax [phone]     Carlos Callaway  1970  ISK:[id number]   Date of visit:  6/24/2019    Subjective:    Carlos Callaway is a 52 y.o.  female who presents to [facility] today (6/24/2019) for evaluation of:    Chief Complaint   Patient presents with    Otalgia     bilat drainage for a few months some vertigo       Otalgia    There is pain in the left ear. This is a new problem. The current episode started yesterday. The problem occurs constantly. The problem has been gradually worsening. There has been no fever. The pain is at a severity of 3/10. Associated symptoms include ear discharge. Pertinent negatives include no coughing, diarrhea, headaches, rash, rhinorrhea, sore throat or vomiting. Associated symptoms comments: Waxy drainage from both ears for \"couple months\". Lightheadedness since yesterday. . She has tried nothing for the symptoms. The treatment provided no relief. She has the following problem list:  Patient Active Problem List   Diagnosis    Anxiety    Reflux    DVT of popliteal vein (HCC)    Ventricular tachycardia, inducible ([facility])        Current medications are:  Current Outpatient Medications   Medication Sig Dispense Refill    ciprofloxacin-dexamethasone (CIPRODEX) 0.3-0.1 % otic suspension Place 3 drops into both ears 2 times daily for 7 days 1 Bottle 0    esomeprazole (NEXIUM) 40 MG delayed release capsule       metoprolol succinate (TOPROL XL) 25 MG extended release tablet Take 25 mg by mouth daily      Alum Hydroxide-Mag Carbonate (GAVISCON PO) Take 2 tablets by mouth nightly. No current facility-administered medications for this visit. She is allergic to doxycycline calcium. .    She  reports that she quit smoking about 5 months ago.  Her smoking use included cigarettes. She started smoking about 34 years ago. She has a 25.00 pack-year smoking history. She has never used smokeless tobacco.      Objective:    Vitals:    06/24/19 1723   BP: 122/68   Pulse: 74   Temp: 98 °F (36.7 °C)   SpO2: 98%     There is no height or weight on file to calculate BMI. Review of Systems   Constitutional: Positive for appetite change. Negative for chills, fatigue and fever. HENT: Positive for ear discharge and ear pain. Negative for congestion, rhinorrhea and sore throat. Respiratory: Negative. Negative for cough. Cardiovascular: Negative. Gastrointestinal: Negative. Negative for diarrhea and vomiting. Skin: Negative for rash. Neurological: Positive for light-headedness. Negative for headaches. Physical Exam   Constitutional: She is oriented to person, place, and time. She appears well-developed and well-nourished. HENT:   Head: Normocephalic. Right Ear: Hearing, external ear and ear canal normal. A middle ear effusion is present. Left Ear: Hearing and external ear normal. There is swelling (erythema of canal). A middle ear effusion is present. Nose: Nose normal.   Mouth/Throat: Uvula is midline, oropharynx is clear and moist and mucous membranes are normal.   Eyes: Pupils are equal, round, and reactive to light. Neck: Normal range of motion. Neck supple. Cardiovascular: Normal rate, regular rhythm and normal heart sounds. Pulmonary/Chest: Effort normal and breath sounds normal.   Lymphadenopathy:     She has no cervical adenopathy. Neurological: She is alert and oriented to person, place, and time. Skin: Skin is warm and dry. Psychiatric: She has a normal mood and affect. Her behavior is normal. Thought content normal.   Nursing note and vitals reviewed. Assessment and Plan:    No results found for this visit on 06/24/19. Diagnosis Orders   1.  Acute otitis externa of both ears, unspecified type  ciprofloxacin-dexamethasone (CIPRODEX) 0.3-0.1 % otic suspension     Take full course of antibiotic ear drops. Take Tylenol or ibuprofen for fever or pain. Keep ear dry and clean. Follow up with PCP if symptoms persist or worsen. No orders of the defined types were placed in this encounter.         Electronically signed by SHERRI Blake CNP on 6/24/19 at 5:27 PM

## 2019-06-24 NOTE — PATIENT INSTRUCTIONS

## 2019-08-14 ENCOUNTER — PATIENT MESSAGE (OUTPATIENT)
Dept: FAMILY MEDICINE CLINIC | Age: 49
End: 2019-08-14

## 2019-08-14 DIAGNOSIS — F41.9 ANXIETY: ICD-10-CM

## 2019-08-15 RX ORDER — ALPRAZOLAM 0.25 MG/1
0.25 TABLET ORAL PRN
Qty: 30 TABLET | Refills: 0 | Status: SHIPPED | OUTPATIENT
Start: 2019-08-15 | End: 2019-09-14

## 2019-10-01 ENCOUNTER — OFFICE VISIT (OUTPATIENT)
Dept: PRIMARY CARE CLINIC | Age: 49
End: 2019-10-01
Payer: COMMERCIAL

## 2019-10-01 VITALS
RESPIRATION RATE: 16 BRPM | DIASTOLIC BLOOD PRESSURE: 54 MMHG | HEART RATE: 62 BPM | BODY MASS INDEX: 28.19 KG/M2 | TEMPERATURE: 97.4 F | SYSTOLIC BLOOD PRESSURE: 98 MMHG | WEIGHT: 179.6 LBS | HEIGHT: 67 IN | OXYGEN SATURATION: 98 %

## 2019-10-01 DIAGNOSIS — H66.002 NON-RECURRENT ACUTE SUPPURATIVE OTITIS MEDIA OF LEFT EAR WITHOUT SPONTANEOUS RUPTURE OF TYMPANIC MEMBRANE: Primary | ICD-10-CM

## 2019-10-01 PROCEDURE — 99213 OFFICE O/P EST LOW 20 MIN: CPT | Performed by: NURSE PRACTITIONER

## 2019-10-01 RX ORDER — AMOXICILLIN 500 MG/1
500 CAPSULE ORAL 3 TIMES DAILY
Qty: 30 CAPSULE | Refills: 0 | Status: SHIPPED | OUTPATIENT
Start: 2019-10-01 | End: 2019-10-11

## 2019-10-01 ASSESSMENT — ENCOUNTER SYMPTOMS
SORE THROAT: 0
VOMITING: 0
COUGH: 0
RHINORRHEA: 0
DIARRHEA: 0
ABDOMINAL PAIN: 0
RESPIRATORY NEGATIVE: 1

## 2019-10-28 ENCOUNTER — OFFICE VISIT (OUTPATIENT)
Dept: FAMILY MEDICINE CLINIC | Age: 49
End: 2019-10-28
Payer: COMMERCIAL

## 2019-10-28 VITALS
HEART RATE: 70 BPM | SYSTOLIC BLOOD PRESSURE: 98 MMHG | WEIGHT: 181.8 LBS | HEIGHT: 68 IN | DIASTOLIC BLOOD PRESSURE: 58 MMHG | OXYGEN SATURATION: 96 % | TEMPERATURE: 98.3 F | RESPIRATION RATE: 12 BRPM | BODY MASS INDEX: 27.55 KG/M2

## 2019-10-28 DIAGNOSIS — R42 DIZZINESS: Primary | ICD-10-CM

## 2019-10-28 DIAGNOSIS — H92.12 EAR DRAINAGE, LEFT: ICD-10-CM

## 2019-10-28 PROCEDURE — 99214 OFFICE O/P EST MOD 30 MIN: CPT | Performed by: NURSE PRACTITIONER

## 2019-10-28 RX ORDER — FLUTICASONE PROPIONATE 50 MCG
2 SPRAY, SUSPENSION (ML) NASAL DAILY
Qty: 1 BOTTLE | Refills: 3 | Status: SHIPPED | OUTPATIENT
Start: 2019-10-28 | End: 2020-09-29 | Stop reason: SDUPTHER

## 2019-10-28 RX ORDER — ALPRAZOLAM 0.25 MG/1
0.25 TABLET ORAL DAILY PRN
COMMUNITY
Start: 2016-05-10 | End: 2019-11-04 | Stop reason: SDUPTHER

## 2019-10-28 RX ORDER — MECLIZINE HYDROCHLORIDE 25 MG/1
25 TABLET ORAL 3 TIMES DAILY PRN
Qty: 30 TABLET | Refills: 0 | Status: SHIPPED | OUTPATIENT
Start: 2019-10-28 | End: 2019-11-07

## 2019-10-28 ASSESSMENT — ENCOUNTER SYMPTOMS
SINUS PRESSURE: 0
RHINORRHEA: 0
SINUS PAIN: 0
ALLERGIC/IMMUNOLOGIC NEGATIVE: 1
SORE THROAT: 0

## 2019-11-05 RX ORDER — ESOMEPRAZOLE MAGNESIUM 40 MG/1
CAPSULE, DELAYED RELEASE ORAL
Qty: 60 CAPSULE | Refills: 10 | Status: SHIPPED
Start: 2019-11-05 | End: 2020-02-20 | Stop reason: SDUPTHER

## 2019-12-11 DIAGNOSIS — N63.0 BREAST LUMP: Primary | ICD-10-CM

## 2019-12-16 ENCOUNTER — HOSPITAL ENCOUNTER (OUTPATIENT)
Dept: ULTRASOUND IMAGING | Age: 49
Discharge: HOME | End: 2019-12-18
Payer: COMMERCIAL

## 2019-12-16 ENCOUNTER — HOSPITAL ENCOUNTER (OUTPATIENT)
Dept: MAMMOGRAPHY | Age: 49
Discharge: HOME | End: 2019-12-18
Payer: COMMERCIAL

## 2019-12-16 DIAGNOSIS — N63.0 BREAST LUMP: ICD-10-CM

## 2019-12-16 PROCEDURE — 76642 ULTRASOUND BREAST LIMITED: CPT

## 2019-12-16 PROCEDURE — G0279 TOMOSYNTHESIS, MAMMO: HCPCS

## 2020-01-22 ENCOUNTER — PATIENT MESSAGE (OUTPATIENT)
Dept: FAMILY MEDICINE CLINIC | Age: 50
End: 2020-01-22

## 2020-01-22 RX ORDER — ALPRAZOLAM 0.25 MG/1
0.25 TABLET ORAL 3 TIMES DAILY PRN
Qty: 30 TABLET | Refills: 0 | Status: SHIPPED | OUTPATIENT
Start: 2020-01-22 | End: 2020-03-17 | Stop reason: SDUPTHER

## 2020-01-22 NOTE — TELEPHONE ENCOUNTER
From: Ester Waite  To: Cora Spaulding MD  Sent: 1/22/2020 8:41 AM EST  Subject: Prescription Question    Miriam, can you call in a refill prescription for my Xanax? Let me know.     Thanks,  Mellissa Saint

## 2020-02-20 ENCOUNTER — OFFICE VISIT (OUTPATIENT)
Dept: FAMILY MEDICINE CLINIC | Age: 50
End: 2020-02-20
Payer: COMMERCIAL

## 2020-02-20 VITALS
BODY MASS INDEX: 27.57 KG/M2 | HEART RATE: 72 BPM | HEIGHT: 68 IN | WEIGHT: 181.88 LBS | SYSTOLIC BLOOD PRESSURE: 118 MMHG | DIASTOLIC BLOOD PRESSURE: 68 MMHG

## 2020-02-20 PROCEDURE — 99213 OFFICE O/P EST LOW 20 MIN: CPT | Performed by: FAMILY MEDICINE

## 2020-02-20 RX ORDER — FLUOCINONIDE TOPICAL SOLUTION USP, 0.05% 0.5 MG/ML
SOLUTION TOPICAL
Qty: 60 ML | Refills: 2 | Status: SHIPPED | OUTPATIENT
Start: 2020-02-20 | End: 2020-05-18

## 2020-02-20 ASSESSMENT — ENCOUNTER SYMPTOMS
GASTROINTESTINAL NEGATIVE: 1
EYES NEGATIVE: 1
ALLERGIC/IMMUNOLOGIC NEGATIVE: 1
RESPIRATORY NEGATIVE: 1

## 2020-02-20 ASSESSMENT — PATIENT HEALTH QUESTIONNAIRE - PHQ9
2. FEELING DOWN, DEPRESSED OR HOPELESS: 0
SUM OF ALL RESPONSES TO PHQ9 QUESTIONS 1 & 2: 0
SUM OF ALL RESPONSES TO PHQ QUESTIONS 1-9: 0
1. LITTLE INTEREST OR PLEASURE IN DOING THINGS: 0
SUM OF ALL RESPONSES TO PHQ QUESTIONS 1-9: 0

## 2020-02-20 NOTE — PROGRESS NOTES
2020     Dora Hercules (:  1970) is a 52 y.o. female, here for evaluation of the following medical concerns:    HPI Acute visit for some scalp and skin rash concerns. Rash to the back of the neck. No prior issues with rash. She was in the hospital for defibrillator placement and the rash seemed to start about that time. Has another blemish on the back of the back shoulder. Past Medical History:   Diagnosis Date    Anxiety     DVT of popliteal vein ([facility] 2018    right leg, following knee surgery    Reflux     Ventricular tachycardia, inducible ([facility] 2019    syncope->abnormal ekg/ischemia?->normal cath. ->ep with sustained/non sustained/inducible monomorphic VT [facility]     Past Surgical History:   Procedure Laterality Date    CARDIAC DEFIBRILLATOR PLACEMENT Left 2019    [facility].  sustained VT, non sustained/inducible VT on EP study.  CHOLECYSTECTOMY  2010    COLONOSCOPY  2018    [facility], single polyp cecum, diverticuli , follow up 5 years. Hafsa Winkler   fibroid/bleeding    KNEE SURGERY  2016    KNEE SURGERY Right 2018    rt knee scope, scar debridement, MMD, patellofemoral ligament repair, microfracture of medial femoral condyle    LAPAROSCOPY  2012    Exploratory; lysis of adhesions.  UPPER GASTROINTESTINAL ENDOSCOPY  2012    Grade 2 esophagitis.  UPPER GASTROINTESTINAL ENDOSCOPY  2010    small hiatal hernia          Review of Systems   Constitutional: Negative. HENT: Negative. Eyes: Negative. Respiratory: Negative. Cardiovascular: Negative. Gastrointestinal: Negative. Endocrine: Negative. Genitourinary: Negative. Musculoskeletal: Negative. Skin: Positive for rash and wound. Allergic/Immunologic: Negative. Neurological: Negative. Hematological: Negative.

## 2020-03-14 ENCOUNTER — HOSPITAL ENCOUNTER (OUTPATIENT)
Dept: INTERVENTIONAL RADIOLOGY/VASCULAR | Age: 50
Discharge: HOME | End: 2020-03-16
Payer: COMMERCIAL

## 2020-03-14 ENCOUNTER — OFFICE VISIT (OUTPATIENT)
Dept: PRIMARY CARE CLINIC | Age: 50
End: 2020-03-14
Payer: COMMERCIAL

## 2020-03-14 VITALS
SYSTOLIC BLOOD PRESSURE: 112 MMHG | DIASTOLIC BLOOD PRESSURE: 80 MMHG | BODY MASS INDEX: 29.19 KG/M2 | HEIGHT: 67 IN | TEMPERATURE: 97.4 F | HEART RATE: 60 BPM | WEIGHT: 186 LBS | OXYGEN SATURATION: 98 % | RESPIRATION RATE: 16 BRPM

## 2020-03-14 PROCEDURE — 99214 OFFICE O/P EST MOD 30 MIN: CPT | Performed by: FAMILY MEDICINE

## 2020-03-14 PROCEDURE — 93971 EXTREMITY STUDY: CPT

## 2020-03-14 RX ORDER — PREDNISONE 20 MG/1
TABLET ORAL
Qty: 15 TABLET | Refills: 0 | Status: SHIPPED | OUTPATIENT
Start: 2020-03-14 | End: 2020-05-18

## 2020-03-14 ASSESSMENT — ENCOUNTER SYMPTOMS
BACK PAIN: 0
CHEST TIGHTNESS: 0
GASTROINTESTINAL NEGATIVE: 1
WHEEZING: 0
EYES NEGATIVE: 1
SHORTNESS OF BREATH: 0

## 2020-03-14 NOTE — PROGRESS NOTES
Behavior normal.         Thought Content: Thought content normal.         Judgment: Judgment normal.         ASSESSMENT/PLAN:  Encounter Diagnosis   Name Primary?  Acute pain of right lower extremity Yes     Orders Placed This Encounter   Medications    predniSONE (DELTASONE) 20 MG tablet     Si bid for 5 days, 1 qd for 5 days     Dispense:  15 tablet     Refill:  0     Orders Placed This Encounter   Procedures    VL DUP LOWER EXTREMITY VENOUS RIGHT     Standing Status:   Future     Number of Occurrences:   1     Standing Expiration Date:   3/14/2021     Order Specific Question:   Reason for exam:     Answer:   right lower extremity pain with known history of DVT     VL DUP LOWER EXTREMITY VENOUS RIGHT  Narrative: EXAMINATION:  DUPLEX VENOUS ULTRASOUND OF THE RIGHT LOWER EXTREMITY, 3/14/2020 12:48 pm    TECHNIQUE:  Duplex ultrasound and Doppler images were obtained of the right lower  extremity. COMPARISON:  None. HISTORY:  ORDERING SYSTEM PROVIDED HISTORY: Acute pain of right lower extremity  TECHNOLOGIST PROVIDED HISTORY:  right lower extremity pain with known history of DVT  Reason for Exam: rt knee pain  Acuity: Acute  Type of Exam: Initial    FINDINGS:  The visualized veins of the right lower extremity are patent and free of  echogenic thrombus. The veins are normally compressible and have normal  phasic flow. Impression: No evidence of DVT in the right lower extremity. No DVT is noted. Has had chronic issues with her right knee and has had 2 surgical procedures and still has pain issues. Patient notes that with travel in certain cars she can get irritation based on positioning of the leg. At this time would recommend prednisone to help with acute inflammation and swelling. Tylenol/Motrin prn    Return  if no improvement in symptoms or if any further symptoms arise. An electronic signature was used to authenticate this note.     --Johnny Ku,  on 3/14/2020 at 11:55 AM

## 2020-03-16 ENCOUNTER — PATIENT MESSAGE (OUTPATIENT)
Dept: FAMILY MEDICINE CLINIC | Age: 50
End: 2020-03-16

## 2020-03-17 RX ORDER — ALPRAZOLAM 0.25 MG/1
0.25 TABLET ORAL 3 TIMES DAILY PRN
Qty: 30 TABLET | Refills: 0 | Status: SHIPPED | OUTPATIENT
Start: 2020-03-17 | End: 2020-04-16

## 2020-05-18 ENCOUNTER — OFFICE VISIT (OUTPATIENT)
Dept: FAMILY MEDICINE CLINIC | Age: 50
End: 2020-05-18
Payer: COMMERCIAL

## 2020-05-18 ENCOUNTER — HOSPITAL ENCOUNTER (OUTPATIENT)
Dept: LAB | Age: 50
Discharge: HOME | End: 2020-05-18
Payer: COMMERCIAL

## 2020-05-18 VITALS
TEMPERATURE: 98.6 F | WEIGHT: 193 LBS | HEIGHT: 67 IN | HEART RATE: 74 BPM | OXYGEN SATURATION: 97 % | DIASTOLIC BLOOD PRESSURE: 60 MMHG | RESPIRATION RATE: 16 BRPM | SYSTOLIC BLOOD PRESSURE: 100 MMHG | BODY MASS INDEX: 30.29 KG/M2

## 2020-05-18 LAB
ALBUMIN SERPL-MCNC: 4.4 G/DL (ref 3.5–5.2)
ALBUMIN/GLOBULIN RATIO: 1.6 (ref 1–2.5)
ALP BLD-CCNC: 70 U/L (ref 35–104)
ALT SERPL-CCNC: 38 U/L (ref 5–33)
AMYLASE: 52 U/L (ref 28–100)
ANION GAP SERPL CALCULATED.3IONS-SCNC: 10 MMOL/L (ref 9–17)
AST SERPL-CCNC: 26 U/L
BILIRUB SERPL-MCNC: 0.3 MG/DL (ref 0.3–1.2)
BUN BLDV-MCNC: 15 MG/DL (ref 6–20)
BUN/CREAT BLD: 18 (ref 9–20)
CALCIUM SERPL-MCNC: 9.9 MG/DL (ref 8.6–10.4)
CHLORIDE BLD-SCNC: 101 MMOL/L (ref 98–107)
CO2: 29 MMOL/L (ref 20–31)
CREAT SERPL-MCNC: 0.83 MG/DL (ref 0.5–0.9)
GFR AFRICAN AMERICAN: >60 ML/MIN
GFR NON-AFRICAN AMERICAN: >60 ML/MIN
GFR SERPL CREATININE-BSD FRML MDRD: ABNORMAL ML/MIN/{1.73_M2}
GFR SERPL CREATININE-BSD FRML MDRD: ABNORMAL ML/MIN/{1.73_M2}
GLUCOSE BLD-MCNC: 136 MG/DL (ref 70–99)
HCT VFR BLD CALC: 42.2 % (ref 36.3–47.1)
HEMOGLOBIN: 13.9 G/DL (ref 11.9–15.1)
LIPASE: 25 U/L (ref 13–60)
MCH RBC QN AUTO: 29.4 PG (ref 25.2–33.5)
MCHC RBC AUTO-ENTMCNC: 32.9 G/DL (ref 25.2–33.5)
MCV RBC AUTO: 89.4 FL (ref 82.6–102.9)
NRBC AUTOMATED: 0 PER 100 WBC
PDW BLD-RTO: 12.8 % (ref 11.8–14.4)
PLATELET # BLD: 294 K/UL (ref 138–453)
PMV BLD AUTO: 10.5 FL (ref 8.1–13.5)
POTASSIUM SERPL-SCNC: 4.8 MMOL/L (ref 3.7–5.3)
RBC # BLD: 4.72 M/UL (ref 3.95–5.11)
SODIUM BLD-SCNC: 140 MMOL/L (ref 135–144)
TOTAL PROTEIN: 7.2 G/DL (ref 6.4–8.3)
WBC # BLD: 6.8 K/UL (ref 3.5–11.3)

## 2020-05-18 PROCEDURE — 80053 COMPREHEN METABOLIC PANEL: CPT

## 2020-05-18 PROCEDURE — 85027 COMPLETE CBC AUTOMATED: CPT

## 2020-05-18 PROCEDURE — 82150 ASSAY OF AMYLASE: CPT

## 2020-05-18 PROCEDURE — 83690 ASSAY OF LIPASE: CPT

## 2020-05-18 PROCEDURE — 36415 COLL VENOUS BLD VENIPUNCTURE: CPT

## 2020-05-18 PROCEDURE — 99214 OFFICE O/P EST MOD 30 MIN: CPT | Performed by: FAMILY MEDICINE

## 2020-05-18 RX ORDER — ONDANSETRON 4 MG/1
4 TABLET, FILM COATED ORAL 3 TIMES DAILY PRN
Qty: 15 TABLET | Refills: 1 | Status: SHIPPED | OUTPATIENT
Start: 2020-05-18 | End: 2021-01-04 | Stop reason: ALTCHOICE

## 2020-05-18 RX ORDER — SUCRALFATE 1 G/1
1 TABLET ORAL 4 TIMES DAILY
Qty: 120 TABLET | Refills: 0 | Status: SHIPPED | OUTPATIENT
Start: 2020-05-18 | End: 2021-01-04

## 2020-05-18 RX ORDER — PANTOPRAZOLE SODIUM 40 MG/1
40 TABLET, DELAYED RELEASE ORAL DAILY
Qty: 30 TABLET | Refills: 3 | Status: SHIPPED | OUTPATIENT
Start: 2020-05-18 | End: 2020-09-29 | Stop reason: ALTCHOICE

## 2020-05-18 RX ORDER — NICOTINE POLACRILEX 4 MG/1
20 GUM, CHEWING ORAL 2 TIMES DAILY
COMMUNITY
End: 2020-05-18 | Stop reason: ALTCHOICE

## 2020-05-18 NOTE — PROGRESS NOTES
Kidney Disease Father     Cancer Father     Heart Disease Other     High Blood Pressure Other     Other Child         respiratory illness       Social History     Socioeconomic History    Marital status:      Spouse name: Not on file    Number of children: Not on file    Years of education: Not on file    Highest education level: Not on file   Occupational History    Not on file   Social Needs    Financial resource strain: Not on file    Food insecurity     Worry: Not on file     Inability: Not on file    Transportation needs     Medical: Not on file     Non-medical: Not on file   Tobacco Use    Smoking status: Former Smoker     Packs/day: 1.00     Years: 25.00     Pack years: 25.00     Types: Cigarettes     Start date: 1985     Last attempt to quit: 2019     Years since quittin.3    Smokeless tobacco: Never Used   Substance and Sexual Activity    Alcohol use:  Yes     Alcohol/week: 0.0 standard drinks     Frequency: Monthly or less     Drinks per session: 1 or 2     Binge frequency: Never     Comment: occasional    Drug use: No    Sexual activity: Not on file   Lifestyle    Physical activity     Days per week: Not on file     Minutes per session: Not on file    Stress: Not on file   Relationships    Social connections     Talks on phone: Not on file     Gets together: Not on file     Attends [employer] service: Not on file     Active member of club or organization: Not on file     Attends meetings of clubs or organizations: Not on file     Relationship status: Not on file    Intimate partner violence     Fear of current or ex partner: Not on file     Emotionally abused: Not on file     Physically abused: Not on file     Forced sexual activity: Not on file   Other Topics Concern    Not on file   Social History Narrative    Not on file       Current Outpatient Medications   Medication Sig Dispense Refill    omeprazole 20 MG EC tablet Take 20 mg by mouth 2 times daily     

## 2020-05-26 ENCOUNTER — INITIAL CONSULT (OUTPATIENT)
Dept: SURGERY | Age: 50
End: 2020-05-26
Payer: COMMERCIAL

## 2020-05-26 VITALS
SYSTOLIC BLOOD PRESSURE: 102 MMHG | WEIGHT: 195 LBS | HEIGHT: 67 IN | DIASTOLIC BLOOD PRESSURE: 60 MMHG | HEART RATE: 58 BPM | TEMPERATURE: 97.9 F | BODY MASS INDEX: 30.61 KG/M2

## 2020-05-26 PROCEDURE — 99204 OFFICE O/P NEW MOD 45 MIN: CPT | Performed by: SURGERY

## 2020-05-26 RX ORDER — POLYETHYLENE GLYCOL 3350, SODIUM SULFATE ANHYDROUS, SODIUM BICARBONATE, SODIUM CHLORIDE, POTASSIUM CHLORIDE 236; 22.74; 6.74; 5.86; 2.97 G/4L; G/4L; G/4L; G/4L; G/4L
4 POWDER, FOR SOLUTION ORAL ONCE
Qty: 4000 ML | Refills: 0 | Status: SHIPPED | OUTPATIENT
Start: 2020-05-26 | End: 2020-05-26

## 2020-05-26 RX ORDER — BISACODYL 5 MG
TABLET, DELAYED RELEASE (ENTERIC COATED) ORAL
Qty: 2 TABLET | Refills: 0 | Status: ON HOLD
Start: 2020-05-26 | End: 2020-06-10 | Stop reason: HOSPADM

## 2020-05-26 NOTE — PROGRESS NOTES
(Right, 07/02/2018); Endometrial ablation (~2000); Colonoscopy (06/04/2018); and Cardiac defibrillator placement (Left, 01/2019). Family History  family history includes Cancer in her father; Heart Disease in her father and another family member; High Blood Pressure in her father and another family member; Kidney Disease in her father; Other in her child and mother; Stroke in her father. Social History  Tobacco use:  reports that she quit smoking about 16 months ago. Her smoking use included cigarettes. She started smoking about 35 years ago. She has a 25.00 pack-year smoking history. She has never used smokeless tobacco.  Alcohol use:  reports current alcohol use. Drug use:  reports no history of drug use. Medications  Current Medications:   Current Outpatient Medications   Medication Sig Dispense Refill    bisacodyl (BISACODYL) 5 MG EC tablet Take per bowel prep instructions 2 tablet 0    polyethylene glycol (GOLYTELY) 236 g solution Take 4,000 mLs by mouth once for 1 dose 4000 mL 0    pantoprazole (PROTONIX) 40 MG tablet Take 1 tablet by mouth daily 30 tablet 3    sucralfate (CARAFATE) 1 GM tablet Take 1 tablet by mouth 4 times daily 120 tablet 0    ondansetron (ZOFRAN) 4 MG tablet Take 1 tablet by mouth 3 times daily as needed for Nausea or Vomiting 15 tablet 1    fluticasone (FLONASE) 50 MCG/ACT nasal spray 2 sprays by Nasal route daily 1 Bottle 3    metoprolol succinate (TOPROL XL) 25 MG extended release tablet Take 25 mg by mouth daily      Alum Hydroxide-Mag Carbonate (GAVISCON PO) Take 2 tablets by mouth nightly. No current facility-administered medications for this visit. Home Medications:   Prior to Admission medications    Medication Sig Start Date End Date Taking?  Authorizing Provider   bisacodyl (BISACODYL) 5 MG EC tablet Take per bowel prep instructions 5/26/20  Yes Hailee Bergeron,    polyethylene glycol (GOLYTELY) 236 g solution Take 4,000 mLs by mouth once for 1 dose evidence of otorrhea. Nose/Sinuses:  Nares normal. Septum midline. Mucosa normal. No external drainage noted. Mouth/Neck:  Mucosa moist.  No external oral lesions. Trachea midline. No visible masses. Lungs:  Normal chest expansion, unlabored breathing without accessory muscle use. No audible rales, rhonchi, or wheezing. Cardiovascular: S1S2. No evidence of JVD. No evidence of pulsatile masses in abdomen  Abdomen:  Soft, tender to palpation of the RUQ at the midclavicular line without deep tenderness or rebound/peritoneal signs, no overlying skin changes, no palpable masses, no organomegaly  Musculoskeletal: No evidence of bony/muscular deformities, trauma, atrophy of either left/right upper/lower extremity. No evidence of digital clubbing or cyanosis. Neurologic:  CN 2-12 grossly intact without obvious deficits. Grossly normal sensation in all extremities. Psychiatric: appropriate judgement and insight, appropriate recall of recent and remote memory, no evidence of depression/anxiety/agitation    DIAGNOSES:   Diagnosis Orders   1. Gastroesophageal reflux disease with esophagitis  FL ESOPHAGRAM   2. Chronic RUQ pain     3. Adenomatous polyp of colon, unspecified part of colon           PLAN:  · Rx esophagram to r/o esophageal dysmotility or other pathologies  · Schedule for EGD for surveillance of previously known esophagitis. Schedule also for surveillance cscope for previous polypectomy of precancerous lesion. The risks include bleeding, infection, scarring, perforation, damage to surrounding tissues, need for further surgery in the future. The benefits, alternatives, complications and procedure details were explained to the pt, all questions were answered. · The patient was counseled at length about the risks of lara Covid-19 during their perioperative period and any recovery window from their procedure.   The patient was made aware that lara Covid-19  may worsen their prognosis for

## 2020-05-26 NOTE — LETTER
[address]  Phone: [phone]  Fax: [phone]      20    Patient: Delmy Pitts  MRN: [id number]  : 1970  Date of visit: 2020    Dear Dr Maddi Batres: Thank you for the request for consultation for Delmy Pitts to me for the evaluation of GERD. Below are the relevant portions of my assessment and plan of care. If you have questions, please do not hesitate to call me. I look forward to following Delmy Pitts along with you.     Sincerely,        Yamilet Stoner, DO

## 2020-05-27 ENCOUNTER — PRE-PROCEDURE TELEPHONE (OUTPATIENT)
Dept: PREADMISSION TESTING | Age: 50
End: 2020-05-27

## 2020-05-29 ENCOUNTER — HOSPITAL ENCOUNTER (OUTPATIENT)
Age: 50
Setting detail: SPECIMEN
Discharge: HOME | End: 2020-05-29
Payer: COMMERCIAL

## 2020-05-31 ENCOUNTER — TELEPHONE (OUTPATIENT)
Dept: PRIMARY CARE CLINIC | Age: 50
End: 2020-05-31

## 2020-06-02 ENCOUNTER — HOSPITAL ENCOUNTER (OUTPATIENT)
Dept: GENERAL RADIOLOGY | Age: 50
Discharge: HOME | End: 2020-06-04
Payer: COMMERCIAL

## 2020-06-02 ENCOUNTER — PRE-PROCEDURE TELEPHONE (OUTPATIENT)
Dept: PREADMISSION TESTING | Age: 50
End: 2020-06-02

## 2020-06-02 PROCEDURE — 2500000003 HC RX 250 WO HCPCS: Performed by: SURGERY

## 2020-06-02 PROCEDURE — 74220 X-RAY XM ESOPHAGUS 1CNTRST: CPT

## 2020-06-02 RX ADMIN — BARIUM SULFATE 140 ML: 980 POWDER, FOR SUSPENSION ORAL at 09:18

## 2020-06-02 RX ADMIN — BARIUM SULFATE 200 ML: 960 POWDER, FOR SUSPENSION ORAL at 09:19

## 2020-06-05 ENCOUNTER — HOSPITAL ENCOUNTER (OUTPATIENT)
Dept: PREADMISSION TESTING | Age: 50
Setting detail: SPECIMEN
Discharge: HOME | End: 2020-06-09
Payer: COMMERCIAL

## 2020-06-05 PROCEDURE — U0004 COV-19 TEST NON-CDC HGH THRU: HCPCS

## 2020-06-06 LAB
SARS-COV-2, PCR: NOT DETECTED
SARS-COV-2, RAPID: NORMAL
SARS-COV-2: NORMAL
SOURCE: NORMAL

## 2020-06-10 ENCOUNTER — HOSPITAL ENCOUNTER (OUTPATIENT)
Age: 50
Setting detail: OUTPATIENT SURGERY
Discharge: HOME | End: 2020-06-10
Attending: SURGERY | Admitting: SURGERY
Payer: COMMERCIAL

## 2020-06-10 ENCOUNTER — ANESTHESIA EVENT (OUTPATIENT)
Dept: OPERATING ROOM | Age: 50
End: 2020-06-10
Payer: COMMERCIAL

## 2020-06-10 ENCOUNTER — ANESTHESIA (OUTPATIENT)
Dept: OPERATING ROOM | Age: 50
End: 2020-06-10
Payer: COMMERCIAL

## 2020-06-10 VITALS
HEART RATE: 61 BPM | WEIGHT: 193 LBS | TEMPERATURE: 97.2 F | DIASTOLIC BLOOD PRESSURE: 56 MMHG | SYSTOLIC BLOOD PRESSURE: 101 MMHG | OXYGEN SATURATION: 98 % | HEIGHT: 67 IN | BODY MASS INDEX: 30.29 KG/M2 | RESPIRATION RATE: 16 BRPM

## 2020-06-10 VITALS — SYSTOLIC BLOOD PRESSURE: 112 MMHG | OXYGEN SATURATION: 97 % | DIASTOLIC BLOOD PRESSURE: 61 MMHG | TEMPERATURE: 97.2 F

## 2020-06-10 PROCEDURE — 3700000001 HC ADD 15 MINUTES (ANESTHESIA): Performed by: SURGERY

## 2020-06-10 PROCEDURE — 3609027000 HC COLONOSCOPY: Performed by: SURGERY

## 2020-06-10 PROCEDURE — 45378 DIAGNOSTIC COLONOSCOPY: CPT | Performed by: SURGERY

## 2020-06-10 PROCEDURE — 88305 TISSUE EXAM BY PATHOLOGIST: CPT

## 2020-06-10 PROCEDURE — 2709999900 HC NON-CHARGEABLE SUPPLY: Performed by: SURGERY

## 2020-06-10 PROCEDURE — 2580000003 HC RX 258: Performed by: SURGERY

## 2020-06-10 PROCEDURE — 7100000011 HC PHASE II RECOVERY - ADDTL 15 MIN: Performed by: SURGERY

## 2020-06-10 PROCEDURE — 6360000002 HC RX W HCPCS: Performed by: NURSE ANESTHETIST, CERTIFIED REGISTERED

## 2020-06-10 PROCEDURE — 3609012400 HC EGD TRANSORAL BIOPSY SINGLE/MULTIPLE: Performed by: SURGERY

## 2020-06-10 PROCEDURE — 2500000003 HC RX 250 WO HCPCS: Performed by: NURSE ANESTHETIST, CERTIFIED REGISTERED

## 2020-06-10 PROCEDURE — 3700000000 HC ANESTHESIA ATTENDED CARE: Performed by: SURGERY

## 2020-06-10 PROCEDURE — 7100000010 HC PHASE II RECOVERY - FIRST 15 MIN: Performed by: SURGERY

## 2020-06-10 PROCEDURE — 43239 EGD BIOPSY SINGLE/MULTIPLE: CPT | Performed by: SURGERY

## 2020-06-10 RX ORDER — SODIUM CHLORIDE 0.9 % (FLUSH) 0.9 %
10 SYRINGE (ML) INJECTION EVERY 12 HOURS SCHEDULED
Status: DISCONTINUED | OUTPATIENT
Start: 2020-06-10 | End: 2020-06-10 | Stop reason: HOSPADM

## 2020-06-10 RX ORDER — SODIUM CHLORIDE, SODIUM LACTATE, POTASSIUM CHLORIDE, CALCIUM CHLORIDE 600; 310; 30; 20 MG/100ML; MG/100ML; MG/100ML; MG/100ML
INJECTION, SOLUTION INTRAVENOUS CONTINUOUS
Status: DISCONTINUED | OUTPATIENT
Start: 2020-06-10 | End: 2020-06-10 | Stop reason: HOSPADM

## 2020-06-10 RX ORDER — LIDOCAINE HYDROCHLORIDE 40 MG/ML
INJECTION, SOLUTION RETROBULBAR; TOPICAL PRN
Status: DISCONTINUED | OUTPATIENT
Start: 2020-06-10 | End: 2020-06-10 | Stop reason: SDUPTHER

## 2020-06-10 RX ORDER — METOPROLOL TARTRATE 5 MG/5ML
INJECTION INTRAVENOUS PRN
Status: DISCONTINUED | OUTPATIENT
Start: 2020-06-10 | End: 2020-06-10 | Stop reason: SDUPTHER

## 2020-06-10 RX ORDER — SODIUM CHLORIDE 0.9 % (FLUSH) 0.9 %
10 SYRINGE (ML) INJECTION PRN
Status: DISCONTINUED | OUTPATIENT
Start: 2020-06-10 | End: 2020-06-10 | Stop reason: HOSPADM

## 2020-06-10 RX ORDER — PROPOFOL 10 MG/ML
INJECTION, EMULSION INTRAVENOUS PRN
Status: DISCONTINUED | OUTPATIENT
Start: 2020-06-10 | End: 2020-06-10 | Stop reason: SDUPTHER

## 2020-06-10 RX ORDER — GLYCOPYRROLATE 0.2 MG/ML
INJECTION INTRAMUSCULAR; INTRAVENOUS PRN
Status: DISCONTINUED | OUTPATIENT
Start: 2020-06-10 | End: 2020-06-10 | Stop reason: SDUPTHER

## 2020-06-10 RX ADMIN — LIDOCAINE HYDROCHLORIDE 100 MG: 40 INJECTION, SOLUTION RETROBULBAR; TOPICAL at 07:30

## 2020-06-10 RX ADMIN — GLYCOPYRROLATE 0.2 MG: 0.2 INJECTION INTRAMUSCULAR; INTRAVENOUS at 07:20

## 2020-06-10 RX ADMIN — METOPROLOL TARTRATE 2.5 MG: 5 INJECTION INTRAVENOUS at 07:28

## 2020-06-10 RX ADMIN — SODIUM CHLORIDE, POTASSIUM CHLORIDE, SODIUM LACTATE AND CALCIUM CHLORIDE: 600; 310; 30; 20 INJECTION, SOLUTION INTRAVENOUS at 07:07

## 2020-06-10 RX ADMIN — PROPOFOL 50 MG: 10 INJECTION, EMULSION INTRAVENOUS at 07:30

## 2020-06-10 RX ADMIN — PROPOFOL 500 MG: 10 INJECTION, EMULSION INTRAVENOUS at 07:31

## 2020-06-10 ASSESSMENT — PAIN SCALES - GENERAL
PAINLEVEL_OUTOF10: 0

## 2020-06-10 ASSESSMENT — PAIN - FUNCTIONAL ASSESSMENT: PAIN_FUNCTIONAL_ASSESSMENT: 0-10

## 2020-06-10 NOTE — H&P
[facility]      Patient's Name/ Date of Birth/ Gender: Bobo Alvarenga / 1970 (48 y.o.) / female     Attending physician: Varun Bustillo DO    CC: reflux, history precancerous colon polyp    History of present Illness: Bobo Alvarenga is a 48 y.o. female, presents today for colonoscopy and EGD    Past Medical History:  has a past medical history of Anxiety, DVT of popliteal vein ([address] Reflux, and Ventricular tachycardia, inducible ([address] Past Surgical History:   Past Surgical History:   Procedure Laterality Date    CARDIAC DEFIBRILLATOR PLACEMENT Left 01/2019    [facility]  sustained VT, non sustained/inducible VT on EP study.  CHOLECYSTECTOMY  12/23/2010    COLONOSCOPY  06/04/2018    [facility], single polyp cecum, diverticuli , follow up 5 years. Colby Shine   fibroid/bleeding    KNEE SURGERY  11/14/2016    KNEE SURGERY Right 07/02/2018    rt knee scope, scar debridement, MMD, patellofemoral ligament repair, microfracture of medial femoral condyle    LAPAROSCOPY  09/12/2012    Exploratory; lysis of adhesions.  UPPER GASTROINTESTINAL ENDOSCOPY  07/23/2012    Grade 2 esophagitis.  UPPER GASTROINTESTINAL ENDOSCOPY  12/17/2010    small hiatal hernia       Social History:  reports that she quit smoking about 16 months ago. Her smoking use included cigarettes. She started smoking about 35 years ago. She has a 25.00 pack-year smoking history. She has never used smokeless tobacco. She reports current alcohol use. She reports that she does not use drugs. Family History: family history includes Cancer in her father; Heart Disease in her father and another family member; High Blood Pressure in her father and another family member; Kidney Disease in her father; Other in her child and mother; Stroke in her father.     Review of Systems:   General:

## 2020-06-10 NOTE — OP NOTE
the second portion of the duodenum. On slow withdrawal to the stomach, the following findings noted:    Duodenum: normal  Stomach: ?mild antral gastritis    The scope was then retroflexed to visualize the GE junction, evidence of a hiatal hernia not noted. The endoscope was then withdrawn to the distal esophagus. Z line seen and found to be normal.     Biopsies were taken of the antrum and mid/distal esophagus with cold biopsy forceps. Hemostasis was excellent. The stomach was then decompressed. The scope was slowly withdrawn visualizing the remainder of the esophagus and no other lesions/abnormalities noted. The scope was completely removed from the patient as well as the bite block. The patient was then repositioned for colonoscopy. A digital rectal exam was performed. The colonoscope was inserted into the rectum without difficulty and the scope was advanced to the cecum which was identified by anatomy and by palpation. Bowel prep was adequate. The scope was slowly withdrawn visualizing the cecum, ascending colon, transverse colon, proximal descending or rectosigmoid colon. The scope was withdrawn to the rectal vault and then retroflexed. The scope was then straightened and removed. The patient tolerated the procedure well and was transferred to the recovery unit in stable condition. Findings:  Cecum/Ascending colon: normal    Transverse colon: normal    Descending/Sigmoid colon: mild sigmoid diverticulosis    Rectum/Anus: int/ext hemorrhoids without complication    Greater than 10 minutes was spent withdrawing the colonoscope. IMPRESSION/PLAN:   1. Clinic will call with biopsy results  2. Increase dietary fiber and water  3.  Patient is advised to have a repeat colonoscopy in 10 years, sooner if blood in the stool, unexplained weight loss, or change in the bowels        Electronically signed by Fabiano Guerra DO on 6/10/2020 at 8:01 AM

## 2020-06-12 LAB — SURGICAL PATHOLOGY REPORT: NORMAL

## 2020-09-08 ENCOUNTER — PATIENT MESSAGE (OUTPATIENT)
Dept: FAMILY MEDICINE CLINIC | Age: 50
End: 2020-09-08

## 2020-09-08 RX ORDER — ALPRAZOLAM 0.25 MG/1
0.25 TABLET ORAL NIGHTLY PRN
Qty: 30 TABLET | Refills: 0 | Status: SHIPPED | OUTPATIENT
Start: 2020-09-08 | End: 2020-12-18 | Stop reason: SDUPTHER

## 2020-09-08 RX ORDER — ALPRAZOLAM 0.25 MG/1
0.25 TABLET ORAL PRN
COMMUNITY
Start: 2019-08-15 | End: 2020-09-08 | Stop reason: SDUPTHER

## 2020-09-08 NOTE — TELEPHONE ENCOUNTER
From: Naty Hoffman  To: Nany Sanabria MD  Sent: 9/8/2020 12:38 PM EDT  Subject: Prescription Question    Miriam. .... can I get a refill on my Xanax called into [facility]?  Thanks, Brittny Unger

## 2020-09-15 ENCOUNTER — OFFICE VISIT (OUTPATIENT)
Dept: SURGERY | Age: 50
End: 2020-09-15
Payer: COMMERCIAL

## 2020-09-15 VITALS
WEIGHT: 198 LBS | HEIGHT: 67 IN | OXYGEN SATURATION: 97 % | BODY MASS INDEX: 31.08 KG/M2 | TEMPERATURE: 97.5 F | SYSTOLIC BLOOD PRESSURE: 90 MMHG | HEART RATE: 57 BPM | RESPIRATION RATE: 18 BRPM | DIASTOLIC BLOOD PRESSURE: 70 MMHG

## 2020-09-15 PROCEDURE — 99213 OFFICE O/P EST LOW 20 MIN: CPT | Performed by: SURGERY

## 2020-09-15 RX ORDER — OMEPRAZOLE 40 MG/1
40 CAPSULE, DELAYED RELEASE ORAL DAILY
Qty: 30 CAPSULE | Refills: 11 | Status: SHIPPED | OUTPATIENT
Start: 2020-09-15 | End: 2021-08-26

## 2020-09-15 NOTE — LETTER
[address] Surg  [facility]  Phone: [phone]  Fax: [phone]      9/15/20    Patient: Katlyn Lamar  MRN: [id number]  : 1970  Date of visit: 9/15/2020    Dear Dr Francisco Campbell:     I saw Katlyn Lamar in follow up for her GERD. Below are the relevant portions of my assessment and plan of care. If you have questions, please do not hesitate to call me. I look forward to following Katlyn Lamar along with you.     Sincerely,        Nae Amezquita, DO

## 2020-09-25 ENCOUNTER — OFFICE VISIT (OUTPATIENT)
Dept: FAMILY MEDICINE CLINIC | Age: 50
End: 2020-09-25
Payer: COMMERCIAL

## 2020-09-25 VITALS
HEART RATE: 68 BPM | HEIGHT: 67 IN | DIASTOLIC BLOOD PRESSURE: 64 MMHG | BODY MASS INDEX: 30.61 KG/M2 | SYSTOLIC BLOOD PRESSURE: 118 MMHG | WEIGHT: 195 LBS

## 2020-09-29 ENCOUNTER — OFFICE VISIT (OUTPATIENT)
Dept: FAMILY MEDICINE CLINIC | Age: 50
End: 2020-09-29
Payer: COMMERCIAL

## 2020-09-29 VITALS
SYSTOLIC BLOOD PRESSURE: 110 MMHG | DIASTOLIC BLOOD PRESSURE: 64 MMHG | BODY MASS INDEX: 30.61 KG/M2 | WEIGHT: 195 LBS | HEIGHT: 67 IN | HEART RATE: 68 BPM

## 2020-09-29 PROCEDURE — 99214 OFFICE O/P EST MOD 30 MIN: CPT | Performed by: FAMILY MEDICINE

## 2020-09-29 PROCEDURE — 10060 I&D ABSCESS SIMPLE/SINGLE: CPT | Performed by: FAMILY MEDICINE

## 2020-09-29 RX ORDER — SULFAMETHOXAZOLE AND TRIMETHOPRIM 800; 160 MG/1; MG/1
1 TABLET ORAL 2 TIMES DAILY
Qty: 20 TABLET | Refills: 0 | Status: SHIPPED | OUTPATIENT
Start: 2020-09-29 | End: 2020-10-09

## 2020-09-29 RX ORDER — FLUTICASONE PROPIONATE 50 MCG
2 SPRAY, SUSPENSION (ML) NASAL DAILY
Qty: 1 BOTTLE | Refills: 3 | Status: SHIPPED | OUTPATIENT
Start: 2020-09-29 | End: 2021-08-11 | Stop reason: SDUPTHER

## 2020-09-29 ASSESSMENT — ENCOUNTER SYMPTOMS
GASTROINTESTINAL NEGATIVE: 1
ALLERGIC/IMMUNOLOGIC NEGATIVE: 1
EYES NEGATIVE: 1
RESPIRATORY NEGATIVE: 1

## 2020-09-29 ASSESSMENT — PATIENT HEALTH QUESTIONNAIRE - PHQ9
2. FEELING DOWN, DEPRESSED OR HOPELESS: 0
SUM OF ALL RESPONSES TO PHQ QUESTIONS 1-9: 0
SUM OF ALL RESPONSES TO PHQ9 QUESTIONS 1 & 2: 0
SUM OF ALL RESPONSES TO PHQ QUESTIONS 1-9: 0
1. LITTLE INTEREST OR PLEASURE IN DOING THINGS: 0

## 2020-09-29 NOTE — PROGRESS NOTES
2020     Rosalinda Muñiz (:  1970) is a 48 y.o. female, here for evaluation of the following medical concerns:    HPI   Scheduled visit for excision of problematic sebaceous cyst on her left upper back . She relates recently more tender and enlarged. Stable size , slowly growing long term. Infected in the past.  Unfortunately, the lesion is swollen, slightly warm and erythematous today as well as soft and flocculent on palpation, and more tender then usual.     Past Medical History:   Diagnosis Date    Anxiety     DVT of popliteal vein ([address] 2018    right leg, following knee surgery    Reflux     Ventricular tachycardia, inducible ([address] 2019    syncope->abnormal ekg/ischemia?->normal cath. ->ep with sustained/non sustained/inducible monomorphic VT [facility]     Past Surgical History:   Procedure Laterality Date    CARDIAC DEFIBRILLATOR PLACEMENT Left 2019    [facility].  sustained VT, non sustained/inducible VT on EP study.  CHOLECYSTECTOMY  2010    COLONOSCOPY  2018    [facility], single polyp cecum, diverticuli , follow up 5 years.  COLONOSCOPY N/A 6/10/2020    *COLONOSCOPY performed by Juve Eason DO at [address]  ~2000    Dr. Jerrica King   fibroid/bleeding   [place]  2016    KNEE SURGERY Right 2018    rt knee scope, scar debridement, MMD, patellofemoral ligament repair, microfracture of medial femoral condyle    LAPAROSCOPY  2012    Exploratory; lysis of adhesions.  UPPER GASTROINTESTINAL ENDOSCOPY  2012    Grade 2 esophagitis.  UPPER GASTROINTESTINAL ENDOSCOPY  2010    small hiatal hernia    UPPER GASTROINTESTINAL ENDOSCOPY N/A 6/10/2020    EGD BIOPSY performed by Juve Eason DO at [address] [place] [address] OR         Review of Systems   Constitutional: Negative. HENT: Negative. Eyes: Negative. Respiratory: Negative. Cardiovascular: Negative. Gastrointestinal: Negative. Endocrine: Negative. Genitourinary: Negative. Musculoskeletal: Negative. Skin: Positive for wound. Allergic/Immunologic: Negative. Neurological: Negative. Hematological: Negative. Psychiatric/Behavioral: Negative. Prior to Visit Medications    Medication Sig Taking? Authorizing Provider   omeprazole (PRILOSEC) 40 MG delayed release capsule Take 1 capsule by mouth daily  Nahid Wilson DO   ALPRAZolam Caprice Hymen) 0.25 MG tablet Take 1 tablet by mouth nightly as needed for Sleep for up to 30 days. Reji Licea MD   sucralfate (CARAFATE) 1 GM tablet Take 1 tablet by mouth 4 times daily  Patient not taking: Reported on 9/15/2020  Reina Yang MD   ondansetron (ZOFRAN) 4 MG tablet Take 1 tablet by mouth 3 times daily as needed for Nausea or Vomiting  Patient not taking: Reported on 9/15/2020  Reina Yang MD   fluticasone (FLONASE) 50 MCG/ACT nasal spray 2 sprays by Nasal route daily  SHERRI Barba - CNP   metoprolol succinate (TOPROL XL) 25 MG extended release tablet Take 25 mg by mouth daily  Historical Provider, MD   Alum Hydroxide-Mag Carbonate (GAVISCON PO) Take 2 tablets by mouth nightly. Historical Provider, MD        Social History     Tobacco Use    Smoking status: Former Smoker     Packs/day: 1.00     Years: 25.00     Pack years: 25.00     Types: Cigarettes     Start date: 1985     Last attempt to quit: 2019     Years since quittin.6    Smokeless tobacco: Never Used   Substance Use Topics    Alcohol use:  Yes     Alcohol/week: 0.0 standard drinks     Frequency: Monthly or less     Drinks per session: 1 or 2     Binge frequency: Never     Comment: occasional        Vitals:    20 1334   BP: 110/64   Site: Right Upper Arm   Position: Sitting   Cuff Size: Large Adult   Pulse: 68   Weight: 195 lb (88.5 kg)   Height: 5' 7\" (1.702 m)     Estimated body mass index is 30.54 kg/m² as establish a good plain between the capsule and the skin. Capsule ruptured with a large amount of sebaceous material and pudding consistency foul pus. Cavity cleaned and flushed twice with diluted peroxide. Not really able to identify the capsule deep in the cavity and we left it alone this time. Cavity packed with iodoform gauze and incision left open/bandaged. Plan to remove gauze tomorrow. Starting bactrim ds bid x 10 days against progressing infection. May attempt removal of cyst in the future when not infected and firm. An electronic signature was used to authenticate this note.     --Song Webber MD on 9/29/2020 at 2:30 PM

## 2020-10-01 NOTE — PROGRESS NOTES
Scheduled for cyst.  She is desiring resection. Not enough time in the schedule to accomplish this day and we decided to re schedule appointment to assure enough time for procedure.

## 2020-11-14 ENCOUNTER — HOSPITAL ENCOUNTER (OUTPATIENT)
Age: 50
Setting detail: SPECIMEN
Discharge: HOME | End: 2020-11-14
Payer: COMMERCIAL

## 2020-11-14 ENCOUNTER — OFFICE VISIT (OUTPATIENT)
Dept: PRIMARY CARE CLINIC | Age: 50
End: 2020-11-14
Payer: COMMERCIAL

## 2020-11-14 VITALS
SYSTOLIC BLOOD PRESSURE: 120 MMHG | BODY MASS INDEX: 30.7 KG/M2 | HEART RATE: 50 BPM | TEMPERATURE: 97.6 F | WEIGHT: 196 LBS | DIASTOLIC BLOOD PRESSURE: 80 MMHG | OXYGEN SATURATION: 100 %

## 2020-11-14 PROCEDURE — 99213 OFFICE O/P EST LOW 20 MIN: CPT | Performed by: FAMILY MEDICINE

## 2020-11-14 PROCEDURE — U0003 INFECTIOUS AGENT DETECTION BY NUCLEIC ACID (DNA OR RNA); SEVERE ACUTE RESPIRATORY SYNDROME CORONAVIRUS 2 (SARS-COV-2) (CORONAVIRUS DISEASE [COVID-19]), AMPLIFIED PROBE TECHNIQUE, MAKING USE OF HIGH THROUGHPUT TECHNOLOGIES AS DESCRIBED BY CMS-2020-01-R: HCPCS

## 2020-11-14 ASSESSMENT — ENCOUNTER SYMPTOMS
VOMITING: 1
SHORTNESS OF BREATH: 0
SINUS PRESSURE: 0
SORE THROAT: 1
DIARRHEA: 1
COUGH: 0
NAUSEA: 1
RHINORRHEA: 1

## 2020-11-14 NOTE — PATIENT INSTRUCTIONS
Patient Education        Nausea and Vomiting: Care Instructions  Your Care Instructions     When you are nauseated, you may feel weak and sweaty and notice a lot of saliva in your mouth. Nausea often leads to vomiting. Most of the time you do not need to worry about nausea and vomiting, but they can be signs of other illnesses. Two common causes of nausea and vomiting are stomach flu and food poisoning. Nausea and vomiting from viral stomach flu will usually start to improve within 24 hours. Nausea and vomiting from food poisoning may last from 12 to 48 hours. The doctor has checked you carefully, but problems can develop later. If you notice any problems or new symptoms, get medical treatment right away. Follow-up care is a key part of your treatment and safety. Be sure to make and go to all appointments, and call your doctor if you are having problems. It's also a good idea to know your test results and keep a list of the medicines you take. How can you care for yourself at home? · To prevent dehydration, drink plenty of fluids, enough so that your urine is light yellow or clear like water. Choose water and other caffeine-free clear liquids until you feel better. If you have kidney, heart, or liver disease and have to limit fluids, talk with your doctor before you increase the amount of fluids you drink. · Rest in bed until you feel better. · When you are able to eat, try clear soups, mild foods, and liquids until all symptoms are gone for 12 to 48 hours. Other good choices include dry toast, crackers, cooked cereal, and gelatin dessert, such as Jell-O. When should you call for help? Call 911 anytime you think you may need emergency care. For example, call if:    · You passed out (lost consciousness). Call your doctor now or seek immediate medical care if:    · You have symptoms of dehydration, such as:  ? Dry eyes and a dry mouth. ? Passing only a little dark urine. ?  Feeling thirstier than usual.   · You have new or worsening belly pain.     · You have a new or higher fever.     · You vomit blood or what looks like coffee grounds. Watch closely for changes in your health, and be sure to contact your doctor if:    · You have ongoing nausea and vomiting.     · Your vomiting is getting worse.     · Your vomiting lasts longer than 2 days.     · You are not getting better as expected. Where can you learn more? Go to https://[employer]Plannify. org and sign in to your [facility] account. Enter 25 [id number] in the [employer] [place] box to learn more about \"Nausea and Vomiting: Care Instructions. \"     If you do not have an account, please click on the \"Sign Up Now\" link. Current as of: June 26, 2019               Content Version: 12.6  © 8029-2250 [employer]. Care instructions adapted under license by [facility] ([facility]). If you have questions about a medical condition or this instruction, always ask your healthcare professional. [place] [address] any warranty or liability for your use of this information. Patient Education        9 Things To Do If You've Been Exposed to COVID-19    Stay home. If you've been exposed, you should stay in isolation for 14 days. Don't go to school, work, or public areas. And don't use public transportation, ride-shares, or taxis unless you have no choice. Leave your home only if you need to get medical care. But call the doctor's office first so they know you're coming, and wear a cloth face cover when you go. Call your doctor. Call your doctor or other health professional to let them know that you've been exposed. They might want you to be tested, or they may have other instructions for you. If you become sick, wear a face cover when you are around other people. It can help stop the spread of the virus when you cough or sneeze. Limit contact with people in your home.   If possible, stay in a separate bedroom and use a separate bathroom. Avoid contact with pets and other animals. Cover your mouth and nose with a tissue when you cough or sneeze. Then throw it in the trash right away. Wash your hands often, especially after you cough or sneeze. Use soap and water, and scrub for at least 20 seconds. If soap and water aren't available, use an alcohol-based hand . Don't share personal household items. These include bedding, towels, cups and glasses, and eating utensils. Clean and disinfect your home every day. Use household  or disinfectant wipes or sprays. Take special care to clean things that you grab with your hands. These include doorknobs, remote controls, phones, and handles on your refrigerator and microwave. And don't forget countertops, tabletops, bathrooms, and computer keyboards. Current as of: July 10, 2020               Content Version: 12.6  © 2006-2020 [employer]. Care instructions adapted under license by [facility] ([facility]). If you have questions about a medical condition or this instruction, always ask your healthcare professional. [place] [address] any warranty or liability for your use of this information. Patient Education        Coronavirus (KLOCL-01): Care Instructions  Overview  The coronavirus disease (COVID-19) is caused by a virus. Symptoms may include a fever, a cough, and shortness of breath. It mainly spreads person-to-person through droplets from coughing and sneezing. The virus also can spread when people are in close contact with someone who is infected. Most people have mild symptoms and can take care of themselves at home. If their symptoms get worse, they may need care in a hospital. Treatment may include medicines to reduce symptoms, plus breathing support such as oxygen therapy or a ventilator. It's important to not spread the virus to others. If you have COVID-19, wear a face cover anytime you are around other people. You need to isolate yourself while you are sick. Leave your home only if you need to get medical care or testing. Follow-up care is a key part of your treatment and safety. Be sure to make and go to all appointments, and call your doctor if you are having problems. It's also a good idea to know your test results and keep a list of the medicines you take. How can you care for yourself at home? · Get extra rest. It can help you feel better. · Drink plenty of fluids. This helps replace fluids lost from fever. Fluids also help ease a scratchy throat. Water, soup, fruit juice, and hot tea with lemon are good choices. · Take acetaminophen (such as Tylenol) to reduce a fever. It may also help with muscle aches. Read and follow all instructions on the label. · Use petroleum jelly on sore skin. This can help if the skin around your nose and lips becomes sore from rubbing a lot with tissues. Tips for self-isolation  · Limit contact with people in your home. If possible, stay in a separate bedroom and use a separate bathroom. · Wear a cloth face cover when you are around other people. It can help stop the spread of the virus when you cough or sneeze. · If you have to leave home, avoid crowds and try to stay at least 6 feet away from other people. · Avoid contact with pets and other animals. · Cover your mouth and nose with a tissue when you cough or sneeze. Then throw it in the trash right away. · Wash your hands often, especially after you cough or sneeze. Use soap and water, and scrub for at least 20 seconds. If soap and water aren't available, use an alcohol-based hand . · Don't share personal household items. These include bedding, towels, cups and glasses, and eating utensils. · [address] in the warmest water allowed for the fabric type, and dry it completely. It's okay to wash other people's laundry with yours. · Clean and disinfect your home every day.  Use household  and disinfectant wipes or sprays. Take special care to clean things that you grab with your hands. These include doorknobs, remote controls, phones, and handles on your refrigerator and microwave. And don't forget countertops, tabletops, bathrooms, and computer keyboards. When you can end self-isolation  · If you know or suspect that you have COVID-19, stay in self-isolation until:  ? You haven't had a fever for 3 days, and  ? Your symptoms have improved, and  ? It's been at least 10 days since your symptoms started. · Talk to your doctor about whether you also need testing, especially if you have a weakened immune system. When should you call for help? Call 911 anytime you think you may need emergency care. For example, call if you have life-threatening symptoms, such as:    · You have severe trouble breathing. (You can't talk at all.)     · You have constant chest pain or pressure.     · You are severely dizzy or lightheaded.     · You are confused or can't think clearly.     · Your face and lips have a blue color.     · You pass out (lose consciousness) or are very hard to wake up. Call your doctor now or seek immediate medical care if:    · You have moderate trouble breathing. (You can't speak a full sentence.)     · You are coughing up blood (more than about 1 teaspoon).     · You have signs of low blood pressure. These include feeling lightheaded; being too weak to stand; and having cold, pale, clammy skin. Watch closely for changes in your health, and be sure to contact your doctor if:    · Your symptoms get worse.     · You are not getting better as expected. Call before you go to the doctor's office. Follow their instructions. And wear a cloth face cover. Current as of: July 10, 2020               Content Version: 12.6  © 4018-8512 [employer]. Care instructions adapted under license by [facility] ([facility]).  If you have questions about a medical condition or this instruction, always ask your healthcare professional. [address] any warranty or liability for your use of this information.

## 2020-11-14 NOTE — PROGRESS NOTES
[address]  [address], [address] Noreen Berrios  [phone]      Daryle Kansky is a 48 y.o. female who is c/o of Headache (x4 days. N/V/D. chills. body aches. )      HPI:     Headache    This is a new problem. The current episode started in the past 7 days (4 days ago). The problem occurs constantly. The problem has been unchanged. The pain is located in the temporal and frontal region. Associated symptoms include nausea, rhinorrhea, a sore throat and vomiting (started yesterday - has vomited 3 times total). Pertinent negatives include no coughing, ear pain, fever or sinus pressure. Treatments tried: Tylenol - last dose 3 hours ago. 2 co-workers have tested positive for Covid - one last week, and one tested positive yesterday. 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 Subjective:      Past Medical History:   Diagnosis Date    Anxiety     DVT of popliteal vein ([address] 08/2018    right leg, following knee surgery    Reflux     Ventricular tachycardia, inducible ([address] 01/18/2019    syncope->abnormal ekg/ischemia?->normal cath. ->ep with sustained/non sustained/inducible monomorphic VT [facility]      Past Surgical History:   Procedure Laterality Date    CARDIAC DEFIBRILLATOR PLACEMENT Left 01/2019    [facility].  sustained VT, non sustained/inducible VT on EP study.  CHOLECYSTECTOMY  12/23/2010    COLONOSCOPY  06/04/2018    [facility], single polyp cecum, diverticuli , follow up 5 years.      COLONOSCOPY N/A 6/10/2020    *[facility]  ~[facility]    Dr. Fabienne Melchor   fibroid/bleeding   [place]  11/14/2016    KNEE SURGERY Right 07/02/2018    rt knee scope, scar debridement, MMD, patellofemoral ligament repair, microfracture of medial femoral condyle    LAPAROSCOPY  09/12/2012    Exploratory; lysis of adhesions.  UPPER GASTROINTESTINAL ENDOSCOPY  2012    Grade 2 esophagitis.  UPPER GASTROINTESTINAL ENDOSCOPY  2010    small hiatal hernia    UPPER GASTROINTESTINAL ENDOSCOPY N/A 6/10/2020    EGD BIOPSY performed by Dori Eller DO at [facility] OR       Social History     Tobacco Use    Smoking status: Former Smoker     Packs/day: 1.00     Years: 25.00     Pack years: 25.00     Types: Cigarettes     Start date: 1985     Last attempt to quit: 2019     Years since quittin.8    Smokeless tobacco: Never Used   Substance Use Topics    Alcohol use: Yes     Alcohol/week: 0.0 standard drinks     Frequency: Monthly or less     Drinks per session: 1 or 2     Binge frequency: Never     Comment: occasional      Current Outpatient Medications   Medication Sig Dispense Refill    fluticasone (FLONASE) 50 MCG/ACT nasal spray 2 sprays by Nasal route daily 1 Bottle 3    omeprazole (PRILOSEC) 40 MG delayed release capsule Take 1 capsule by mouth daily 30 capsule 11    metoprolol succinate (TOPROL XL) 25 MG extended release tablet Take 25 mg by mouth daily      Alum Hydroxide-Mag Carbonate (GAVISCON PO) Take 2 tablets by mouth nightly.  amoxicillin-clavulanate (AUGMENTIN) 875-125 MG per tablet Take 1 tablet by mouth 2 times daily for 10 days 20 tablet 0    sucralfate (CARAFATE) 1 GM tablet Take 1 tablet by mouth 4 times daily (Patient not taking: Reported on 9/15/2020) 120 tablet 0    ondansetron (ZOFRAN) 4 MG tablet Take 1 tablet by mouth 3 times daily as needed for Nausea or Vomiting (Patient not taking: Reported on 9/15/2020) 15 tablet 1     No current facility-administered medications for this visit. Allergies   Allergen Reactions    Doxycycline Calcium Nausea And Vomiting    Meloxicam Rash    Oxycodone-Acetaminophen Rash       Review of Systems   Constitutional: Positive for chills (started this am). Negative for fever. HENT: Positive for rhinorrhea and sore throat. Negative for congestion, ear pain and sinus pressure. Respiratory: Negative for cough and shortness of breath. Gastrointestinal: Positive for diarrhea, nausea and vomiting (started yesterday - has vomited 3 times total). Musculoskeletal: Negative for myalgias. Neurological: Positive for headaches. Objective:     Vitals:    11/14/20 1158   BP: 120/80   Site: Left Upper Arm   Position: Sitting   Cuff Size: Large Adult   Pulse: 50   Temp: 97.6 °F (36.4 °C)   TempSrc: Tympanic   SpO2: 100%   Weight: 196 lb (88.9 kg)     Physical Exam  Vitals signs and nursing note reviewed. Constitutional:       General: She is not in acute distress. Appearance: She is well-developed. HENT:      Head: Normocephalic and atraumatic. Right Ear: Tympanic membrane, ear canal and external ear normal.      Left Ear: Tympanic membrane, ear canal and external ear normal.      Nose: Nose normal.      Right Sinus: No maxillary sinus tenderness or frontal sinus tenderness. Left Sinus: No maxillary sinus tenderness or frontal sinus tenderness. Mouth/Throat:      Pharynx: Posterior oropharyngeal erythema (Mild) present. No oropharyngeal exudate. Comments: Clear post-nasal drainage noted. Eyes:      Conjunctiva/sclera: Conjunctivae normal.      Pupils: Pupils are equal, round, and reactive to light. Neck:      Musculoskeletal: Neck supple. Cardiovascular:      Rate and Rhythm: Normal rate and regular rhythm. Heart sounds: Normal heart sounds. Pulmonary:      Effort: Pulmonary effort is normal. No respiratory distress. Breath sounds: Normal breath sounds. No wheezing or rales. Abdominal:      General: Bowel sounds are normal. There is no distension. Palpations: Abdomen is soft. Tenderness: There is no abdominal tenderness. Lymphadenopathy:      Cervical: No cervical adenopathy. Neurological:      Mental Status: She is alert and oriented to person, place, and time. Assessment:       Diagnosis Orders   1. Nonintractable headache, unspecified chronicity pattern, unspecified headache type  COVID-19 Ambulatory   2. Nausea and vomiting, intractability of vomiting not specified, unspecified vomiting type  COVID-19 Ambulatory   3. Person under investigation for COVID-19  COVID-19 Ambulatory   4. Exposure to COVID-19 virus  COVID-19 Ambulatory       Plan:      Covid test obtained today.  Pt informed to quarantine until results available.     Supportive care discussed - Tylenol/Motrin, warm compresses, sinus rinses, cough suppressants, warm salt water gargles, and increased fluids/rest.         Return if symptoms worsen or fail to improve in 3-4 days. Orders Placed This Encounter   Procedures    COVID-19 Ambulatory     Standing Status:   Future     Number of Occurrences:   1     Standing Expiration Date:   11/14/2021     Scheduling Instructions:      Saline media preferred given current shortage of viral transport media but both acceptable     Order Specific Question:   Is this test for diagnosis or screening? Answer:   Diagnosis of ill patient     Order Specific Question:   Symptomatic for COVID-19 as defined by CDC? Answer:   Yes     Order Specific Question:   Date of Symptom Onset     Answer:   11/11/2020     Order Specific Question:   Hospitalized for COVID-19? Answer:   No     Order Specific Question:   Admitted to ICU for COVID-19? Answer:   No     Order Specific Question:   Employed in healthcare setting? Answer:   No     Order Specific Question:   Resident in a congregate (group) care setting? Answer:   No     Order Specific Question:   Pregnant? Answer:   No     Order Specific Question:   Previously tested for COVID-19? Answer:   Yes     No orders of the defined types were placed in this encounter. Patient given educational materials - see patient instructions. Discussed use, benefit, and side effects of prescribed medications. All patient questions answered. Pt voiced understanding.        Electronically signed by Ambreen Quintanilla DO on 11/22/2020 at 10:45 PM

## 2020-11-18 ENCOUNTER — TELEPHONE (OUTPATIENT)
Dept: PRIMARY CARE CLINIC | Age: 50
End: 2020-11-18

## 2020-11-18 LAB — SARS-COV-2, NAA: NOT DETECTED

## 2020-11-18 RX ORDER — AMOXICILLIN AND CLAVULANATE POTASSIUM 875; 125 MG/1; MG/1
1 TABLET, FILM COATED ORAL 2 TIMES DAILY
Qty: 20 TABLET | Refills: 0 | Status: SHIPPED | OUTPATIENT
Start: 2020-11-18 | End: 2020-11-28

## 2020-11-18 NOTE — TELEPHONE ENCOUNTER
Pt does not have to be seen again to receive further treatment. Her Covid test was negative. If she is still having the symptoms she noted, we can start antibiotic for sinusitis. Will send in Rx for Augmentin BID x 10 days to [facility] now. Does she also need anything for cough suppression?     Supportive care also recommended - Tylenol/Motrin, warm compresses, sinus rinses, cough suppressants, warm salt water gargles, and increased fluids/rest.

## 2020-11-18 NOTE — TELEPHONE ENCOUNTER
Pt calling in stating that she is feeling worse since being seen Saturday for Covid. Pt reports Sinus HA. ST. [facility] voice. Sinus pressure. Taking Tylenol. NEG covid. Uses [facility]. Pt is wondering if she should be seen again or would be willing to do a VV as well since just seen. Please advise.

## 2020-11-18 NOTE — TELEPHONE ENCOUNTER
Patient states that she is not having much of a cough, so she doesn't need a cough suppression. Thanked us for calling her back voiced understanding.

## 2020-12-18 ENCOUNTER — PATIENT MESSAGE (OUTPATIENT)
Dept: FAMILY MEDICINE CLINIC | Age: 50
End: 2020-12-18

## 2020-12-18 RX ORDER — ALPRAZOLAM 0.25 MG/1
0.25 TABLET ORAL NIGHTLY PRN
Qty: 30 TABLET | Refills: 0 | Status: SHIPPED | OUTPATIENT
Start: 2020-12-18 | End: 2021-02-24 | Stop reason: SDUPTHER

## 2020-12-18 NOTE — TELEPHONE ENCOUNTER
From: Fareed Suero  To: Holly Lund MD  Sent: 12/18/2020 8:39 AM EST  Subject: Prescription Question    Miriam, can I get a refill on my Xanax?  Thanks, Raman Torres

## 2021-01-04 ENCOUNTER — OFFICE VISIT (OUTPATIENT)
Dept: FAMILY MEDICINE CLINIC | Age: 51
End: 2021-01-04
Payer: COMMERCIAL

## 2021-01-04 VITALS
HEART RATE: 68 BPM | DIASTOLIC BLOOD PRESSURE: 72 MMHG | HEIGHT: 67 IN | WEIGHT: 202 LBS | SYSTOLIC BLOOD PRESSURE: 122 MMHG | BODY MASS INDEX: 31.71 KG/M2

## 2021-01-04 DIAGNOSIS — K58.2 IRRITABLE BOWEL SYNDROME WITH BOTH CONSTIPATION AND DIARRHEA: ICD-10-CM

## 2021-01-04 DIAGNOSIS — I82.431 ACUTE DEEP VEIN THROMBOSIS (DVT) OF POPLITEAL VEIN OF RIGHT LOWER EXTREMITY (HCC): ICD-10-CM

## 2021-01-04 DIAGNOSIS — Z00.00 HEALTHCARE MAINTENANCE: ICD-10-CM

## 2021-01-04 DIAGNOSIS — F41.9 ANXIETY: ICD-10-CM

## 2021-01-04 DIAGNOSIS — I47.29 VENTRICULAR TACHYCARDIA, INDUCIBLE: ICD-10-CM

## 2021-01-04 DIAGNOSIS — K21.9 GASTROESOPHAGEAL REFLUX DISEASE WITHOUT ESOPHAGITIS: ICD-10-CM

## 2021-01-04 DIAGNOSIS — Z12.31 ENCOUNTER FOR SCREENING MAMMOGRAM FOR BREAST CANCER: Primary | ICD-10-CM

## 2021-01-04 PROCEDURE — 99214 OFFICE O/P EST MOD 30 MIN: CPT | Performed by: FAMILY MEDICINE

## 2021-01-04 RX ORDER — SUCRALFATE 1 G/1
TABLET ORAL
Qty: 120 TABLET | Refills: 0 | Status: SHIPPED | OUTPATIENT
Start: 2021-01-04 | End: 2021-04-13

## 2021-01-04 ASSESSMENT — ENCOUNTER SYMPTOMS
ANAL BLEEDING: 0
DIARRHEA: 1
ALLERGIC/IMMUNOLOGIC NEGATIVE: 1
VOMITING: 0
RESPIRATORY NEGATIVE: 1
EYES NEGATIVE: 1
ABDOMINAL PAIN: 1
NAUSEA: 0

## 2021-01-04 ASSESSMENT — PATIENT HEALTH QUESTIONNAIRE - PHQ9
SUM OF ALL RESPONSES TO PHQ QUESTIONS 1-9: 0
1. LITTLE INTEREST OR PLEASURE IN DOING THINGS: 0
SUM OF ALL RESPONSES TO PHQ QUESTIONS 1-9: 0

## 2021-01-04 NOTE — TELEPHONE ENCOUNTER
Requested Prescriptions     Pending Prescriptions Disp Refills    sucralfate (CARAFATE) 1 GM tablet [Pharmacy Med Name: SUCRALFATE 1 GM TABLET] 120 tablet 0     Sig: TAKE ONE TABLET BY MOUTH FOUR TIMES A DAY     Last Appt:  9/29/2020  Next Appt:   1/4/2021  Med verified in [address] [place] [address] Rd

## 2021-01-04 NOTE — PROGRESS NOTES
Subjective:      Patient ID: Raquel Silver is a 48 y.o. female. HPI   Routine follow up on chronic medical conditions. Gaining wt. Over the last year. Currently following a better diet and using a rowing machine for exercise. More ibs symptoms . Alternating constipation and diarrhea. More generalized abdominal aches. She had issues with dvt, but no residual leg swelling. icd placed for monomorphic VT. No discharges. Past Medical History:   Diagnosis Date    Anxiety     DVT of popliteal vein ([address] 08/2018    right leg, following knee surgery    Reflux     Ventricular tachycardia, inducible ([address] 01/18/2019    syncope->abnormal ekg/ischemia?->normal cath. ->ep with sustained/non sustained/inducible monomorphic VT [facility]     Past Surgical History:   Procedure Laterality Date    CARDIAC DEFIBRILLATOR PLACEMENT Left 01/2019    [facility].  sustained VT, non sustained/inducible VT on EP study.  CHOLECYSTECTOMY  12/23/2010    COLONOSCOPY  06/04/2018    [facility], single polyp cecum, diverticuli , follow up 5 years.  COLONOSCOPY N/A 6/10/2020    *COLONOSCOPY performed by Cordell Karl DO at [address] [place] [address] Shar[facility] Shape  ~2000    Dr. Fiona Pace   fibroid/bleeding   [facility]  11/14/2016    KNEE SURGERY Right 07/02/2018    rt knee scope, scar debridement, MMD, patellofemoral ligament repair, microfracture of medial femoral condyle    LAPAROSCOPY  09/12/2012    Exploratory; lysis of adhesions.  UPPER GASTROINTESTINAL ENDOSCOPY  07/23/2012    Grade 2 esophagitis.  UPPER GASTROINTESTINAL ENDOSCOPY  12/17/2010    small hiatal hernia    UPPER GASTROINTESTINAL ENDOSCOPY N/A 6/10/2020    EGD BIOPSY performed by Cordell DO Karl at [facility] OR       Review of Systems   Constitutional: Positive for unexpected weight change (up). HENT: Negative. Eyes: Negative. Respiratory: Negative.     Cardiovascular: Negative. Gastrointestinal: Positive for abdominal pain and diarrhea. Negative for anal bleeding, nausea and vomiting. Endocrine: Negative. Genitourinary: Negative. Musculoskeletal: Negative. Skin: Negative. Allergic/Immunologic: Negative. Neurological: Negative. Hematological: Negative. Psychiatric/Behavioral: Negative. Objective:   Physical Exam  HENT:      Head: Normocephalic. Nose: Nose normal.      Mouth/Throat:      Pharynx: No oropharyngeal exudate. Eyes:      Pupils: Pupils are equal, round, and reactive to light. Neck:      Musculoskeletal: Normal range of motion. Cardiovascular:      Rate and Rhythm: Normal rate. Pulses: Normal pulses. Pulmonary:      Effort: Pulmonary effort is normal. No respiratory distress. Abdominal:      General: There is no distension. Palpations: There is no mass. Tenderness: There is no abdominal tenderness. There is no guarding. Musculoskeletal: Normal range of motion. General: No swelling. Skin:     General: Skin is warm. Neurological:      General: No focal deficit present. Mental Status: She is alert. Psychiatric:         Mood and Affect: Mood normal.         Behavior: Behavior normal.         Thought Content: Thought content normal.       /72 (Site: Right Upper Arm, Position: Sitting, Cuff Size: Large Adult)   Pulse 68   Ht 5' 7\" (1.702 m)   Wt 202 lb (91.6 kg)   BMI 31.64 kg/m²     Assessment:      Encounter Diagnoses   Name Primary?  Encounter for screening mammogram for breast cancer Yes    Anxiety     Gastroesophageal reflux disease without esophagitis     Ventricular tachycardia, inducible (HCC)     Acute deep vein thrombosis (DVT) of popliteal vein of right lower extremity (HCC)     Irritable bowel syndrome with both constipation and diarrhea            Plan:      Updating annual mammogram    Anxiety: fair. Using alprazolam just prn at present.       Gerd: trying to eat better. Intermittent perceived symptoms, reflux during the night up into the throat. Cont. Omeprazole and carafate. VT:s/p icd. No discharges. On toprol. Cardiology following. dvt following knee surgery right leg. Asx, resolved. Off anticoagulation at present. Ibs. Alternating . Consider fiber supplement bid. Declines flu vaccine.       Conchis Dubois MD

## 2021-01-08 ENCOUNTER — OFFICE VISIT (OUTPATIENT)
Dept: PRIMARY CARE CLINIC | Age: 51
End: 2021-01-08
Payer: COMMERCIAL

## 2021-01-08 VITALS
WEIGHT: 200.2 LBS | DIASTOLIC BLOOD PRESSURE: 60 MMHG | HEART RATE: 67 BPM | OXYGEN SATURATION: 96 % | SYSTOLIC BLOOD PRESSURE: 110 MMHG | BODY MASS INDEX: 31.42 KG/M2 | TEMPERATURE: 98.2 F | HEIGHT: 67 IN

## 2021-01-08 DIAGNOSIS — H60.92 RECURRENT OTITIS EXTERNA OF LEFT EAR: Primary | ICD-10-CM

## 2021-01-08 PROCEDURE — 99213 OFFICE O/P EST LOW 20 MIN: CPT | Performed by: FAMILY MEDICINE

## 2021-01-08 RX ORDER — NEOMYCIN SULFATE, POLYMYXIN B SULFATE AND HYDROCORTISONE 10; 3.5; 1 MG/ML; MG/ML; [USP'U]/ML
3 SUSPENSION/ DROPS AURICULAR (OTIC) 3 TIMES DAILY
Qty: 1 BOTTLE | Refills: 0 | Status: SHIPPED | OUTPATIENT
Start: 2021-01-08 | End: 2021-01-15

## 2021-01-08 ASSESSMENT — ENCOUNTER SYMPTOMS
RHINORRHEA: 0
SORE THROAT: 0
COUGH: 0

## 2021-01-08 NOTE — PATIENT INSTRUCTIONS

## 2021-01-08 NOTE — PROGRESS NOTES
[address]  [address], [address] Noreen Berrios  [phone]      Ella Hancock is a 48 y.o. female who is c/o of Otalgia (left ear hurts behind the ear and turns head and feels dizzy, started about 2 days ago)      HPI:     Otalgia   There is pain in the left ear. This is a recurrent problem. The current episode started in the past 7 days (2 days ago). The problem occurs constantly. There has been no fever. Pain severity now: dull aching pain; radiates under L ear; also having itching. Associated symptoms include ear discharge (thin). Pertinent negatives include no coughing, hearing loss, rhinorrhea or sore throat. Associated symptoms comments: R ear is itchy, but no other symptoms in R ear. Treatments tried: warm compresses, Tylenol, OTC ear drops. Subjective:      Past Medical History:   Diagnosis Date    Anxiety     DVT of popliteal vein ([address] 08/2018    right leg, following knee surgery    Reflux     Ventricular tachycardia, inducible ([address] 01/18/2019    syncope->abnormal ekg/ischemia?->normal cath. ->ep with sustained/non sustained/inducible monomorphic VT [facility]      Past Surgical History:   Procedure Laterality Date    CARDIAC DEFIBRILLATOR PLACEMENT Left 01/2019    [facility].  sustained VT, non sustained/inducible VT on EP study.  CHOLECYSTECTOMY  12/23/2010    COLONOSCOPY  06/04/2018    [facility], single polyp cecum, diverticuli , follow up 5 years.  COLONOSCOPY N/A 6/10/2020    *COLONOSCOPY performed by Teto Rodriguez DO at [address]  ~2000    Dr. Javed Kruger   fibroid/bleeding   [place]  11/14/2016    KNEE SURGERY Right 07/02/2018    rt knee scope, scar debridement, MMD, patellofemoral ligament repair, microfracture of medial femoral condyle    LAPAROSCOPY  09/12/2012    Exploratory; lysis of adhesions.  UPPER GASTROINTESTINAL ENDOSCOPY  2012    Grade 2 esophagitis.  UPPER GASTROINTESTINAL ENDOSCOPY  2010    small hiatal hernia    UPPER GASTROINTESTINAL ENDOSCOPY N/A 6/10/2020    EGD BIOPSY performed by Bria Barrett DO at [address] [place] [address] OR       Social History     Tobacco Use    Smoking status: Former Smoker     Packs/day: 1.00     Years: 25.00     Pack years: 25.00     Types: Cigarettes     Start date: 1985     Quit date: 2019     Years since quittin.0    Smokeless tobacco: Never Used   Substance Use Topics    Alcohol use: Yes     Alcohol/week: 0.0 standard drinks     Frequency: Monthly or less     Drinks per session: 1 or 2     Binge frequency: Never     Comment: occasional      Current Outpatient Medications   Medication Sig Dispense Refill    sucralfate (CARAFATE) 1 GM tablet TAKE ONE TABLET BY MOUTH FOUR TIMES A  tablet 0    fluticasone (FLONASE) 50 MCG/ACT nasal spray 2 sprays by Nasal route daily 1 Bottle 3    omeprazole (PRILOSEC) 40 MG delayed release capsule Take 1 capsule by mouth daily 30 capsule 11    metoprolol succinate (TOPROL XL) 25 MG extended release tablet Take 25 mg by mouth daily      Alum Hydroxide-Mag Carbonate (GAVISCON PO) Take 2 tablets by mouth nightly.  metroNIDAZOLE (FLAGYL) 500 MG tablet Take 1 tablet by mouth 2 times daily for 7 days 14 tablet 0     No current facility-administered medications for this visit. Allergies   Allergen Reactions    Doxycycline Calcium Nausea And Vomiting    Meloxicam Rash    Oxycodone-Acetaminophen Rash       Review of Systems   HENT: Positive for ear discharge (thin) and ear pain. Negative for hearing loss, rhinorrhea and sore throat. Respiratory: Negative for cough. Neurological: Positive for dizziness (with rolling over in bed or turning her head certain ways).        Objective:     Vitals:    21 1209 21 1255   BP: 120/80 110/60   Site: Right Upper Arm Right Upper Arm Position: Sitting Sitting   Cuff Size: Medium Adult Large Adult   Pulse: 67    Temp: 98.2 °F (36.8 °C)    SpO2: 96%    Weight: 200 lb 3.2 oz (90.8 kg)    Height: 5' 7\" (1.702 m)      Physical Exam  Vitals signs and nursing note reviewed. Constitutional:       General: She is not in acute distress. Appearance: She is well-developed. HENT:      Head: Normocephalic and atraumatic. Right Ear: Tympanic membrane, ear canal and external ear normal.      Left Ear: Swelling and tenderness present. No drainage. Nose: Nose normal.      Mouth/Throat:      Mouth: Mucous membranes are moist.      Pharynx: Oropharynx is clear. No posterior oropharyngeal erythema. Eyes:      Conjunctiva/sclera: Conjunctivae normal.   Cardiovascular:      Rate and Rhythm: Normal rate and regular rhythm. Heart sounds: Normal heart sounds. Pulmonary:      Effort: Pulmonary effort is normal. No respiratory distress. Breath sounds: Normal breath sounds. Skin:     General: Skin is warm and dry. Neurological:      General: No focal deficit present. Mental Status: She is alert and oriented to person, place, and time. Psychiatric:         Mood and Affect: Mood normal.         Assessment:       Diagnosis Orders   1. Recurrent otitis externa of left ear  neomycin-polymyxin-hydrocortisone (CORTISPORIN) 3.5-93134-9 otic suspension    Mandy Colón MD, Otolaryngology, [place]       Plan:      Return if symptoms worsen or fail to improve in 3-4 days.     Orders Placed This Encounter   Procedures   Mandy Colón MD, Otolaryngology, [place]     Referral Priority:   Routine     Referral Type:   Eval and Treat     Referral Reason:   Specialty Services Required     Referred to Provider:   Laura Olson MD     Requested Specialty:   Otolaryngology     Number of Visits Requested:   1     Orders Placed This Encounter   Medications  neomycin-polymyxin-hydrocortisone (CORTISPORIN) 3.5-59756-6 otic suspension     Sig: Place 3 drops into the left ear 3 times daily for 7 days     Dispense:  1 Bottle     Refill:  0       Patient given educational materials - see patient instructions. Discussed use, benefit, and side effects of prescribed medications. All patient questions answered. Pt voiced understanding.        Electronically signed by Pavan Berman DO on 1/25/2021 at 12:45 AM

## 2021-01-14 ENCOUNTER — OFFICE VISIT (OUTPATIENT)
Dept: OTOLARYNGOLOGY | Age: 51
End: 2021-01-14
Payer: COMMERCIAL

## 2021-01-14 VITALS
HEART RATE: 72 BPM | WEIGHT: 200 LBS | RESPIRATION RATE: 14 BRPM | HEIGHT: 67 IN | TEMPERATURE: 98.2 F | SYSTOLIC BLOOD PRESSURE: 90 MMHG | BODY MASS INDEX: 31.39 KG/M2 | DIASTOLIC BLOOD PRESSURE: 60 MMHG

## 2021-01-14 DIAGNOSIS — R42 VERTIGO: ICD-10-CM

## 2021-01-14 DIAGNOSIS — H60.8X2 CHRONIC ECZEMATOUS OTITIS EXTERNA OF LEFT EAR: Primary | ICD-10-CM

## 2021-01-14 PROCEDURE — 99203 OFFICE O/P NEW LOW 30 MIN: CPT | Performed by: OTOLARYNGOLOGY

## 2021-01-14 RX ORDER — FLUOCINOLONE ACETONIDE 0.11 MG/ML
5 OIL AURICULAR (OTIC) 2 TIMES DAILY
Qty: 1 BOTTLE | Refills: 0 | Status: SHIPPED | OUTPATIENT
Start: 2021-01-14 | End: 2021-01-21

## 2021-01-14 NOTE — PROGRESS NOTES
2021 2:34 PM ELIZABETH Johnson (:  1970) is a 48 y.o. female,New patient, here for evaluation of the following chief complaint(s):  Otalgia (always left ear and itching )      ASSESSMENT/PLAN:  1. Chronic eczematous otitis externa of left ear    2. Vertigo      1. Chronic eczematous otitis externa of left ear  2. Vertigo     The ear appears to have responded well to the Cortisporin drops. Unremarkable examination today. Discussed that the fluctuating ear pain may be related to her underlying diagnosis of TMJ, otomycosis from recurrent ear drops, or recurrent bacterial otitis externa. Difficult to tell exact etiology having just finished treatment. Have asked her to call the office when she is feeling her traditional ear infection so we can see the examination before treatment. May consider culture at next infection. Possible BPPV. Discussed vestibular rehab and balance testing if symptomatology returns. Since she has been on 7 days of Cortisporin, will have her start Derm otic drops for eczematoid otitis externa. Follow-up next week to reevaluate symptoms. Return in about 1 week (around 2021). SUBJECTIVE/OBJECTIVE:  HPI  Patient is a 27-year-old woman with 1-1/2-week history of left-sided ear pain. She has a history of recurrent otitis externa for the past 6 to 7 years. She has been on Cortisporin otic drops for 7 days. She is frequently evaluated in the urgent care. The left-sided ear pain radiates down the neck and is felt over the jaw joint. Her pain seems to be associated with worsening dizziness particularly when she leans forward or turns her head to the side. Her ear infections are also associated with drainage and severe itching. She is currently on Cortisporin otic drops. She is been on Ciprodex previously. She denies any hearing loss. She has a history of TMJ. Vertigo has resolved.     Review of Systems  ENT ROS: positive for - ear pain    General: The patient is found to be alert and normally responsive female with grossly normal hearing, clear voice and normal articulation. Communication is without difficulty. Voice: Clear   Skin: The skin has normal colour and turgor. Face: The facial contour is symmetric at rest and with movement. Ears: The pinnae have normal contours. AD: EAC clear, TM intact, no effusion/erythema/retraction   AS: EAC clear, TM intact, no effusion/erythema/retraction   Exquisite tenderness to the speculum exam and tenderness over the left TMJ  Eye: The ocular movements are full and symmetric, the conjunctiva is unremarkable; sclera are anicteric, pupillary response is symmetric. No nystagmus is found. Nose:   The external nasal contour is normal  The nasal mucosa has a normal appearance. The nasal septum is straight. The turbinates have a normal appearance  The nares are patent without evidence of polyposis   Oral cavity:   The dentition is healthy. The oral mucosa is without lesions;  the tongue is symmetric with full mobility and is without fasciculation. The soft palate is symmetric. The oropharynx is unremarkable. Neck: The neck has a normal contour; no masses are found on palpation        An electronic signature was used to authenticate this note.     --Kaylen Gibbs MD     1/14/2021 2:34 PM EST

## 2021-01-21 ENCOUNTER — E-VISIT (OUTPATIENT)
Dept: INTERNAL MEDICINE CLINIC | Age: 51
End: 2021-01-21
Payer: COMMERCIAL

## 2021-01-21 DIAGNOSIS — B96.89 BACTERIAL VAGINITIS: Primary | ICD-10-CM

## 2021-01-21 DIAGNOSIS — N76.0 BACTERIAL VAGINITIS: Primary | ICD-10-CM

## 2021-01-21 PROCEDURE — 99421 OL DIG E/M SVC 5-10 MIN: CPT | Performed by: INTERNAL MEDICINE

## 2021-01-22 RX ORDER — METRONIDAZOLE 500 MG/1
500 TABLET ORAL 2 TIMES DAILY
Qty: 14 TABLET | Refills: 0 | Status: SHIPPED | OUTPATIENT
Start: 2021-01-22 | End: 2021-01-29

## 2021-01-22 NOTE — PROGRESS NOTES
5-10 minutes were spent on the digital evaluation and management of this patient. Diagnoses and all orders for this visit:    Bacterial vaginitis  -     metroNIDAZOLE (FLAGYL) 500 MG tablet;  Take 1 tablet by mouth 2 times daily for 7 days

## 2021-01-27 ENCOUNTER — HOSPITAL ENCOUNTER (EMERGENCY)
Age: 51
Discharge: HOME | End: 2021-01-27
Attending: EMERGENCY MEDICINE
Payer: COMMERCIAL

## 2021-01-27 ENCOUNTER — APPOINTMENT (OUTPATIENT)
Dept: GENERAL RADIOLOGY | Age: 51
End: 2021-01-27
Payer: COMMERCIAL

## 2021-01-27 VITALS
HEIGHT: 67 IN | RESPIRATION RATE: 17 BRPM | TEMPERATURE: 97.8 F | BODY MASS INDEX: 30.92 KG/M2 | OXYGEN SATURATION: 96 % | WEIGHT: 197 LBS | HEART RATE: 57 BPM | DIASTOLIC BLOOD PRESSURE: 44 MMHG | SYSTOLIC BLOOD PRESSURE: 108 MMHG

## 2021-01-27 DIAGNOSIS — R07.89 ATYPICAL CHEST PAIN: Primary | ICD-10-CM

## 2021-01-27 LAB
ABSOLUTE EOS #: 0.21 K/UL (ref 0–0.44)
ABSOLUTE IMMATURE GRANULOCYTE: 0.06 K/UL (ref 0–0.3)
ABSOLUTE LYMPH #: 1.32 K/UL (ref 1.1–3.7)
ABSOLUTE MONO #: 0.41 K/UL (ref 0.1–1.2)
ALBUMIN SERPL-MCNC: 4.3 G/DL (ref 3.5–5.2)
ALBUMIN/GLOBULIN RATIO: 1.7 (ref 1–2.5)
ALP BLD-CCNC: 56 U/L (ref 35–104)
ALT SERPL-CCNC: 26 U/L (ref 5–33)
ANION GAP SERPL CALCULATED.3IONS-SCNC: 10 MMOL/L (ref 9–17)
AST SERPL-CCNC: 20 U/L
BASOPHILS # BLD: 0 % (ref 0–2)
BASOPHILS ABSOLUTE: <0.03 K/UL (ref 0–0.2)
BILIRUB SERPL-MCNC: 0.36 MG/DL (ref 0.3–1.2)
BUN BLDV-MCNC: 13 MG/DL (ref 6–20)
BUN/CREAT BLD: 15 (ref 9–20)
CALCIUM SERPL-MCNC: 9.7 MG/DL (ref 8.6–10.4)
CHLORIDE BLD-SCNC: 103 MMOL/L (ref 98–107)
CO2: 26 MMOL/L (ref 20–31)
CREAT SERPL-MCNC: 0.84 MG/DL (ref 0.5–0.9)
D-DIMER QUANTITATIVE: <0.27 MG/L FEU (ref 0–0.59)
DIFFERENTIAL TYPE: ABNORMAL
EKG ATRIAL RATE: 61 BPM
EKG ATRIAL RATE: 65 BPM
EKG P AXIS: 63 DEGREES
EKG P AXIS: 66 DEGREES
EKG P-R INTERVAL: 210 MS
EKG P-R INTERVAL: 220 MS
EKG Q-T INTERVAL: 440 MS
EKG Q-T INTERVAL: 450 MS
EKG QRS DURATION: 138 MS
EKG QRS DURATION: 142 MS
EKG QTC CALCULATION (BAZETT): 453 MS
EKG QTC CALCULATION (BAZETT): 457 MS
EKG R AXIS: 80 DEGREES
EKG R AXIS: 82 DEGREES
EKG T AXIS: 57 DEGREES
EKG T AXIS: 65 DEGREES
EKG VENTRICULAR RATE: 61 BPM
EKG VENTRICULAR RATE: 65 BPM
EOSINOPHILS RELATIVE PERCENT: 4 % (ref 1–4)
GFR AFRICAN AMERICAN: >60 ML/MIN
GFR NON-AFRICAN AMERICAN: >60 ML/MIN
GFR SERPL CREATININE-BSD FRML MDRD: ABNORMAL ML/MIN/{1.73_M2}
GFR SERPL CREATININE-BSD FRML MDRD: ABNORMAL ML/MIN/{1.73_M2}
GLUCOSE BLD-MCNC: 111 MG/DL (ref 70–99)
HCT VFR BLD CALC: 42 % (ref 36.3–47.1)
HEMOGLOBIN: 13.8 G/DL (ref 11.9–15.1)
IMMATURE GRANULOCYTES: 1 %
INR BLD: 1
LIPASE: 19 U/L (ref 13–60)
LYMPHOCYTES # BLD: 25 % (ref 24–43)
MCH RBC QN AUTO: 29.6 PG (ref 25.2–33.5)
MCHC RBC AUTO-ENTMCNC: 32.9 G/DL (ref 25.2–33.5)
MCV RBC AUTO: 90.1 FL (ref 82.6–102.9)
MONOCYTES # BLD: 8 % (ref 3–12)
MYOGLOBIN: <21 NG/ML (ref 25–58)
NRBC AUTOMATED: 0 PER 100 WBC
PDW BLD-RTO: 12.6 % (ref 11.8–14.4)
PLATELET # BLD: 281 K/UL (ref 138–453)
PLATELET ESTIMATE: ABNORMAL
PMV BLD AUTO: 10 FL (ref 8.1–13.5)
POTASSIUM SERPL-SCNC: 4.1 MMOL/L (ref 3.7–5.3)
PROTHROMBIN TIME: 12.8 SEC (ref 11.5–14.2)
RBC # BLD: 4.66 M/UL (ref 3.95–5.11)
RBC # BLD: ABNORMAL 10*6/UL
SEG NEUTROPHILS: 62 % (ref 36–65)
SEGMENTED NEUTROPHILS ABSOLUTE COUNT: 3.31 K/UL (ref 1.5–8.1)
SODIUM BLD-SCNC: 139 MMOL/L (ref 135–144)
TOTAL PROTEIN: 6.9 G/DL (ref 6.4–8.3)
TROPONIN INTERP: NORMAL
TROPONIN INTERP: NORMAL
TROPONIN T: NORMAL NG/ML
TROPONIN T: NORMAL NG/ML
TROPONIN, HIGH SENSITIVITY: <6 NG/L (ref 0–14)
TROPONIN, HIGH SENSITIVITY: <6 NG/L (ref 0–14)
WBC # BLD: 5.3 K/UL (ref 3.5–11.3)
WBC # BLD: ABNORMAL 10*3/UL

## 2021-01-27 PROCEDURE — 85025 COMPLETE CBC W/AUTO DIFF WBC: CPT

## 2021-01-27 PROCEDURE — 71045 X-RAY EXAM CHEST 1 VIEW: CPT

## 2021-01-27 PROCEDURE — 85610 PROTHROMBIN TIME: CPT

## 2021-01-27 PROCEDURE — 99283 EMERGENCY DEPT VISIT LOW MDM: CPT

## 2021-01-27 PROCEDURE — 93005 ELECTROCARDIOGRAM TRACING: CPT | Performed by: EMERGENCY MEDICINE

## 2021-01-27 PROCEDURE — 83690 ASSAY OF LIPASE: CPT

## 2021-01-27 PROCEDURE — 80053 COMPREHEN METABOLIC PANEL: CPT

## 2021-01-27 PROCEDURE — 84484 ASSAY OF TROPONIN QUANT: CPT

## 2021-01-27 PROCEDURE — 36415 COLL VENOUS BLD VENIPUNCTURE: CPT

## 2021-01-27 PROCEDURE — 85379 FIBRIN DEGRADATION QUANT: CPT

## 2021-01-27 PROCEDURE — 83874 ASSAY OF MYOGLOBIN: CPT

## 2021-01-27 ASSESSMENT — ENCOUNTER SYMPTOMS
EYE PAIN: 0
SHORTNESS OF BREATH: 0
NAUSEA: 0
DIARRHEA: 0
COUGH: 0
BACK PAIN: 0
VOMITING: 0
ABDOMINAL PAIN: 0

## 2021-01-27 ASSESSMENT — PAIN DESCRIPTION - PAIN TYPE: TYPE: ACUTE PAIN

## 2021-01-27 ASSESSMENT — PAIN DESCRIPTION - LOCATION: LOCATION: CHEST

## 2021-01-27 NOTE — ED PROVIDER NOTES
[facility]  eMERGENCY dEPARTMENT eNCOUnter      Pt Name: Nae Romano  MRN: [id number]  [place] 1970  Date of evaluation: 1/27/2021      CHIEF COMPLAINT       Chief Complaint   Patient presents with    Chest Pain     x 1 hour    Dizziness         HISTORY OF PRESENT ILLNESS    Nae Romano is a 48 y.o. female who presents with complaint plaint of chest pain that started while she was at work it was a substernal kind of an ache she felt a little dizzy with it lasted about hours about gone now. Patient said she did take a Xanax she is under more stress at work. But she does have a history of syncope and was found to be able to have episodes of V. tach she had an AICD placed last year. It has not gone off. Is been no fevers no chills no cough no shortness of breath no leg swelling      REVIEW OF SYSTEMS         Review of Systems   Constitutional: Negative for chills and fever. HENT: Negative for congestion and ear pain. Eyes: Negative for pain and visual disturbance. Respiratory: Negative for cough and shortness of breath. Cardiovascular: Positive for chest pain. Negative for palpitations and leg swelling. Gastrointestinal: Negative for abdominal pain, diarrhea, nausea and vomiting. Endocrine: Negative for polydipsia and polyuria. Genitourinary: Negative for difficulty urinating, dysuria, frequency, vaginal bleeding and vaginal discharge. Musculoskeletal: Negative for back pain, joint swelling, myalgias, neck pain and neck stiffness. Skin: Negative for rash. Neurological: Positive for dizziness. Negative for weakness and headaches. Hematological: Negative for adenopathy. Does not bruise/bleed easily. Psychiatric/Behavioral: Negative for confusion, self-injury and suicidal ideas. The patient is nervous/anxious.            PAST MEDICAL HISTORY    has a past medical history of Anxiety, DVT of popliteal vein ([address] Reflux, and Ventricular tachycardia, inducible ([facility] SURGICAL HISTORY      has a past surgical history that includes Dilation and curettage of uterus ([address]); laparoscopy (09/12/2012); Upper gastrointestinal endoscopy (07/23/2012); Cholecystectomy (12/23/2010); Upper gastrointestinal endoscopy (12/17/2010); knee surgery (11/14/2016); knee surgery (Right, 07/02/2018); Endometrial ablation (~2000); Colonoscopy (06/04/2018); Cardiac defibrillator placement (Left, 01/2019); Upper gastrointestinal endoscopy (N/A, 6/10/2020); and Colonoscopy (N/A, 6/10/2020). CURRENT MEDICATIONS       Previous Medications    ALUM HYDROXIDE-MAG CARBONATE (GAVISCON PO)    Take 2 tablets by mouth nightly. FLUTICASONE (FLONASE) 50 MCG/ACT NASAL SPRAY    2 sprays by Nasal route daily    METOPROLOL SUCCINATE (TOPROL XL) 25 MG EXTENDED RELEASE TABLET    Take 25 mg by mouth daily    METRONIDAZOLE (FLAGYL) 500 MG TABLET    Take 1 tablet by mouth 2 times daily for 7 days    OMEPRAZOLE (PRILOSEC) 40 MG DELAYED RELEASE CAPSULE    Take 1 capsule by mouth daily    SUCRALFATE (CARAFATE) 1 GM TABLET    TAKE ONE TABLET BY MOUTH FOUR TIMES A DAY       ALLERGIES     is allergic to doxycycline calcium; meloxicam; and oxycodone-acetaminophen. FAMILY HISTORY     She indicated that the status of her mother is unknown. She indicated that the status of her father is unknown. She indicated that the status of her child is unknown. She indicated that the status of her other is unknown.     family history includes Cancer in her father; Heart Disease in her father and another family member; High Blood Pressure in her father and another family member; Kidney Disease in her father; Other in her child and mother; Stroke in her father. SOCIAL HISTORY      reports that she quit smoking about 2 years ago. Her smoking use included cigarettes. She started smoking about 36 years ago. She has a 25.00 pack-year smoking history. She has never used smokeless tobacco. She reports current alcohol use.  She reports that she does not use drugs. PHYSICAL EXAM     INITIAL VITALS:  height is 5' 7\" (1.702 m) and weight is 197 lb (89.4 kg). Her tympanic temperature is 97.8 °F (36.6 °C). Her blood pressure is 105/68 and her pulse is 57. Her respiration is 14 and oxygen saturation is 98%. Physical Exam  Constitutional:       Appearance: She is well-developed. HENT:      Head: Normocephalic and atraumatic. Right Ear: External ear normal.      Left Ear: External ear normal.   Eyes:      Conjunctiva/sclera: Conjunctivae normal.      Pupils: Pupils are equal, round, and reactive to light. Neck:      Musculoskeletal: Normal range of motion. Cardiovascular:      Rate and Rhythm: Normal rate and regular rhythm. Pulses: Normal pulses. Heart sounds: No murmur. Pulmonary:      Effort: Pulmonary effort is normal.      Breath sounds: Normal breath sounds. Comments: Patient has an AICD in place left anterior chest  Abdominal:      General: Bowel sounds are normal.      Palpations: Abdomen is soft. Musculoskeletal: Normal range of motion. General: No tenderness. Skin:     General: Skin is warm and dry. Neurological:      General: No focal deficit present. Mental Status: She is alert and oriented to person, place, and time.    Psychiatric:         Behavior: Behavior normal.           DIFFERENTIAL DIAGNOSIS/ MDM:     Atypical chest pain will do work-up    DIAGNOSTIC RESULTS     EKG: All EKG's are interpreted by the Emergency Department Physician who either signs or Co-signs this chart in the absence of a cardiologist.  EKG shows sinus rhythm with first-degree AV block rate of 65 bpm MS interval is 210 ms QS durations 142 ms QT corrected 457 ms axis is 80 she does have some T wave inversion in lead V1 V2 this was seen on previous EKG from 2017      RADIOLOGY:   I directly visualized the following  images and reviewed the radiologist interpretations:       EXAMINATION:   ONE XRAY VIEW OF THE CHEST       1/27/2021 12:38 pm       COMPARISON:   06/23/2014       HISTORY:   ORDERING SYSTEM PROVIDED HISTORY: chest pain   TECHNOLOGIST PROVIDED HISTORY:   chest pain   Reason for Exam: chest pain       FINDINGS:   Single portable frontal view of the chest is submitted for review.  The   cardiac silhouette is normal in size.  Lung parenchyma is clear without focal   airspace consolidation, sizeable pleural effusion, or pneumothorax. Implantable cardiac device lead overlies the right ventricle.  Trachea is   midline.  Visualized osseous structures and soft tissues are grossly intact.           Impression   No acute cardiopulmonary pathology.               ED BEDSIDE ULTRASOUND:       LABS:  Labs Reviewed   COMPREHENSIVE METABOLIC PANEL - Abnormal; Notable for the following components:       Result Value    Glucose 111 (*)     All other components within normal limits   CBC WITH AUTO DIFFERENTIAL - Abnormal; Notable for the following components:    Immature Granulocytes 1 (*)     All other components within normal limits   MYOGLOBIN, SERUM - Abnormal; Notable for the following components:    Myoglobin <21 (*)     All other components within normal limits   LIPASE   PROTIME-INR   TROPONIN   D-DIMER, QUANTITATIVE   TROPONIN       Labs negative and 2 including 2 sets of enzymes    EMERGENCY DEPARTMENT COURSE:   Vitals:    Vitals:    01/27/21 1109 01/27/21 1115 01/27/21 1130 01/27/21 1200   BP: 139/88 114/61 108/68 105/68   Pulse: 65 63 60 57   Resp: 15 13 15 14   Temp: 97.8 °F (36.6 °C)      TempSrc: Tympanic      SpO2: 100% 98% 93% 98%   Weight: 197 lb (89.4 kg)      Height: 5' 7\" (1.702 m)        -------------------------  BP: 105/68, Temp: 97.8 °F (36.6 °C), Pulse: 57, Resp: 14        Re-evaluation Notes    Resting comfortably    CRITICAL CARE:   None        CONSULTS:      PROCEDURES:  None    FINAL IMPRESSION      1.  Atypical chest pain          DISPOSITION/PLAN   DISPOSITION discharge    Condition on Disposition    Stable    PATIENT REFERRED TO:  Lencho Banks, [address]  [phone]            DISCHARGE MEDICATIONS:  New Prescriptions    No medications on file       (Please note that portions of this note were completed with a voice recognition program.  Efforts were made to edit the dictations but occasionally words are mis-transcribed.)    Cabral MD, F.A.A.E.M.   Attending Emergency Physician                          Zabrina Bentley MD  01/27/21 0352

## 2021-02-24 ENCOUNTER — PATIENT MESSAGE (OUTPATIENT)
Dept: FAMILY MEDICINE CLINIC | Age: 51
End: 2021-02-24

## 2021-02-24 DIAGNOSIS — F41.9 ANXIETY: ICD-10-CM

## 2021-02-24 RX ORDER — ALPRAZOLAM 0.25 MG/1
0.25 TABLET ORAL NIGHTLY PRN
Qty: 30 TABLET | Refills: 0 | Status: SHIPPED | OUTPATIENT
Start: 2021-02-24 | End: 2021-04-19 | Stop reason: SDUPTHER

## 2021-02-24 NOTE — TELEPHONE ENCOUNTER
Leslie Harvey called requesting a refill of the below medication which has been pended for you: [place]. Alprazolam 0.25 mg last filled 12/20/20 #30/30 days. Report available for your review. Requested Prescriptions     Pending Prescriptions Disp Refills    ALPRAZolam (XANAX) 0.25 MG tablet 30 tablet 0     Sig: Take 1 tablet by mouth nightly as needed for Sleep for up to 30 days.        Last Appointment Date: 1/4/2021  Next Appointment Date: 7/14/2021    Allergies   Allergen Reactions    Doxycycline Calcium Nausea And Vomiting    Meloxicam Rash    Oxycodone-Acetaminophen Rash

## 2021-02-24 NOTE — TELEPHONE ENCOUNTER
From: Eliot Hinton  To: Cecilia Jolley MD  Sent: 2/24/2021 9:58 AM EST  Subject: Prescription Question    Hi Miriam! I need a refill on my Xanax. I did not see this in the request refill page. Can you call this in to [address] [place] [address] for me? Thanks!   Noe Dutton

## 2021-03-19 ENCOUNTER — E-VISIT (OUTPATIENT)
Dept: INTERNAL MEDICINE CLINIC | Age: 51
End: 2021-03-19
Payer: COMMERCIAL

## 2021-03-19 DIAGNOSIS — M54.32 LEFT SIDED SCIATICA: Primary | ICD-10-CM

## 2021-03-19 PROCEDURE — 99421 OL DIG E/M SVC 5-10 MIN: CPT | Performed by: INTERNAL MEDICINE

## 2021-03-22 RX ORDER — PREDNISONE 20 MG/1
TABLET ORAL
Qty: 18 TABLET | Refills: 0 | Status: SHIPPED | OUTPATIENT
Start: 2021-03-22 | End: 2021-04-01

## 2021-04-13 ENCOUNTER — OFFICE VISIT (OUTPATIENT)
Dept: FAMILY MEDICINE CLINIC | Age: 51
End: 2021-04-13
Payer: COMMERCIAL

## 2021-04-13 ENCOUNTER — HOSPITAL ENCOUNTER (OUTPATIENT)
Age: 51
Setting detail: SPECIMEN
Discharge: HOME | End: 2021-04-13
Payer: COMMERCIAL

## 2021-04-13 VITALS
HEIGHT: 67 IN | HEART RATE: 66 BPM | WEIGHT: 197 LBS | SYSTOLIC BLOOD PRESSURE: 98 MMHG | OXYGEN SATURATION: 96 % | RESPIRATION RATE: 16 BRPM | BODY MASS INDEX: 30.92 KG/M2 | DIASTOLIC BLOOD PRESSURE: 62 MMHG

## 2021-04-13 DIAGNOSIS — N30.00 ACUTE CYSTITIS WITHOUT HEMATURIA: Primary | ICD-10-CM

## 2021-04-13 DIAGNOSIS — R30.0 DYSURIA: ICD-10-CM

## 2021-04-13 DIAGNOSIS — N30.00 ACUTE CYSTITIS WITHOUT HEMATURIA: ICD-10-CM

## 2021-04-13 LAB
-: ABNORMAL
AMORPHOUS: ABNORMAL
BACTERIA: ABNORMAL
BILIRUBIN URINE: NEGATIVE
CASTS UA: ABNORMAL /LPF (ref 0–2)
COLOR: ABNORMAL
COMMENT UA: ABNORMAL
CRYSTALS, UA: ABNORMAL /HPF
EPITHELIAL CELLS UA: ABNORMAL /HPF (ref 0–5)
GLUCOSE URINE: NEGATIVE
KETONES, URINE: NEGATIVE
LEUKOCYTE ESTERASE, URINE: ABNORMAL
MUCUS: ABNORMAL
NITRITE, URINE: NEGATIVE
OTHER OBSERVATIONS UA: ABNORMAL
PH UA: 7 (ref 5–6)
PROTEIN UA: NEGATIVE
RBC UA: ABNORMAL /HPF (ref 0–4)
RENAL EPITHELIAL, UA: ABNORMAL /HPF
SPECIFIC GRAVITY UA: 1.02 (ref 1.01–1.02)
TRICHOMONAS: ABNORMAL
TURBIDITY: ABNORMAL
URINE HGB: NEGATIVE
UROBILINOGEN, URINE: NORMAL
WBC UA: ABNORMAL /HPF (ref 0–4)
YEAST: ABNORMAL

## 2021-04-13 PROCEDURE — 99214 OFFICE O/P EST MOD 30 MIN: CPT | Performed by: FAMILY MEDICINE

## 2021-04-13 PROCEDURE — 87186 SC STD MICRODIL/AGAR DIL: CPT

## 2021-04-13 PROCEDURE — 87086 URINE CULTURE/COLONY COUNT: CPT

## 2021-04-13 PROCEDURE — 87088 URINE BACTERIA CULTURE: CPT

## 2021-04-13 PROCEDURE — 81015 MICROSCOPIC EXAM OF URINE: CPT

## 2021-04-13 RX ORDER — NITROFURANTOIN 25; 75 MG/1; MG/1
100 CAPSULE ORAL 2 TIMES DAILY
Qty: 14 CAPSULE | Refills: 0 | Status: SHIPPED
Start: 2021-04-13 | End: 2021-04-16 | Stop reason: SINTOL

## 2021-04-13 ASSESSMENT — PATIENT HEALTH QUESTIONNAIRE - PHQ9
1. LITTLE INTEREST OR PLEASURE IN DOING THINGS: 0
2. FEELING DOWN, DEPRESSED OR HOPELESS: 0
SUM OF ALL RESPONSES TO PHQ QUESTIONS 1-9: 0

## 2021-04-13 ASSESSMENT — ENCOUNTER SYMPTOMS
CONSTIPATION: 0
NAUSEA: 0
ABDOMINAL PAIN: 1
VOMITING: 0
DIARRHEA: 0

## 2021-04-13 NOTE — PROGRESS NOTES
2021     Danyel Barnhart (:  1970) is a 48 y.o. female, here for evaluation of the following medical concerns:    Urinary Frequency   This is a new problem. The current episode started in the past 7 days. The problem occurs every urination. The problem has been gradually worsening. The quality of the pain is described as burning. There has been no fever. Associated symptoms include flank pain, frequency and urgency. Pertinent negatives include no chills, nausea or vomiting. Associated symptoms comments: Lower abdominal pressure. She has tried increased fluids (cranberry juice, AZO) for the symptoms. The treatment provided moderate relief. Did review patient's med list, allergies, social history,pmhx and pshx today as noted in the record. Review of Systems   Constitutional: Negative for chills, fatigue and fever. Gastrointestinal: Positive for abdominal pain. Negative for constipation, diarrhea, nausea and vomiting. Genitourinary: Positive for dysuria, flank pain, frequency, pelvic pain and urgency. Prior to Visit Medications    Medication Sig Taking? Authorizing Provider   Cranberry-Vitamin C-Probiotic (AZO CRANBERRY PO) Take by mouth Yes Historical Provider, MD   fluticasone (FLONASE) 50 MCG/ACT nasal spray 2 sprays by Nasal route daily Yes Venkatesh Fox MD   omeprazole (PRILOSEC) 40 MG delayed release capsule Take 1 capsule by mouth daily Yes Vidya Washington DO   metoprolol succinate (TOPROL XL) 25 MG extended release tablet Take 25 mg by mouth daily Yes Historical Provider, MD   Alum Hydroxide-Mag Carbonate (GAVISCON PO) Take 2 tablets by mouth nightly.  Yes Historical Provider, MD        Social History     Tobacco Use    Smoking status: Former Smoker     Packs/day: 1.00     Years: 25.00     Pack years: 25.00     Types: Cigarettes     Start date: 1985     Quit date: 2019     Years since quittin.2    Smokeless tobacco: Never Used   Substance Use Topics    Alcohol Medications    nitrofurantoin, macrocrystal-monohydrate, (MACROBID) 100 MG capsule     Sig: Take 1 capsule by mouth 2 times daily     Dispense:  14 capsule     Refill:  0     Orders Placed This Encounter   Procedures    Culture, Urine     Standing Status:   Future     Standing Expiration Date:   4/13/2022     Order Specific Question:   Specify (ex-cath, midstream, cysto, etc)? Answer:   midstream    Urinalysis, Micro     Standing Status:   Future     Number of Occurrences:   1     Standing Expiration Date:   4/13/2022       I did review her UA today and this does show evidence of infection. Will treat with RX as noted above and will order urine culture. Increase fluids and rest    Return  if no improvement in symptoms or if any further symptoms arise. No follow-ups on file. An electronic signature was used to authenticate this note.     --Obey Saini, DO on 4/13/2021 at 3:55 PM

## 2021-04-15 LAB
CULTURE: ABNORMAL
Lab: ABNORMAL
SPECIMEN DESCRIPTION: ABNORMAL

## 2021-04-16 ENCOUNTER — TELEPHONE (OUTPATIENT)
Dept: FAMILY MEDICINE CLINIC | Age: 51
End: 2021-04-16

## 2021-04-16 RX ORDER — CIPROFLOXACIN 500 MG/1
500 TABLET, FILM COATED ORAL 2 TIMES DAILY
Qty: 14 TABLET | Refills: 0 | Status: SHIPPED | OUTPATIENT
Start: 2021-04-16 | End: 2021-06-30

## 2021-04-16 NOTE — TELEPHONE ENCOUNTER
Pt calling stating she was seen for a UTI and given Macrobid but she is having severe diarrhea, do you want to change meds, pt uses [facility], please advise at above number.

## 2021-04-19 ENCOUNTER — PATIENT MESSAGE (OUTPATIENT)
Dept: FAMILY MEDICINE CLINIC | Age: 51
End: 2021-04-19

## 2021-04-19 DIAGNOSIS — F41.9 ANXIETY: ICD-10-CM

## 2021-04-19 RX ORDER — ALPRAZOLAM 0.25 MG/1
0.25 TABLET ORAL NIGHTLY PRN
Qty: 30 TABLET | Refills: 0 | Status: SHIPPED | OUTPATIENT
Start: 2021-04-19 | End: 2021-07-15 | Stop reason: SDUPTHER

## 2021-04-19 NOTE — TELEPHONE ENCOUNTER
From: Felisha Jaimes  To: Talha Floyd MD  Sent: 4/19/2021 3:16 PM EDT  Subject: Prescription Question    Miriam---I need a refill of my Xanax. Can you send this to [facility] in [place]?   THanks, Diogenes Berman

## 2021-04-22 ENCOUNTER — HOSPITAL ENCOUNTER (OUTPATIENT)
Dept: MAMMOGRAPHY | Age: 51
Discharge: HOME | End: 2021-04-24
Payer: COMMERCIAL

## 2021-04-22 DIAGNOSIS — Z12.31 ENCOUNTER FOR SCREENING MAMMOGRAM FOR BREAST CANCER: ICD-10-CM

## 2021-04-22 PROCEDURE — 77063 BREAST TOMOSYNTHESIS BI: CPT

## 2021-06-30 ENCOUNTER — OFFICE VISIT (OUTPATIENT)
Dept: FAMILY MEDICINE CLINIC | Age: 51
End: 2021-06-30
Payer: COMMERCIAL

## 2021-06-30 VITALS
DIASTOLIC BLOOD PRESSURE: 80 MMHG | HEART RATE: 74 BPM | RESPIRATION RATE: 16 BRPM | SYSTOLIC BLOOD PRESSURE: 100 MMHG | TEMPERATURE: 97.9 F | WEIGHT: 196.4 LBS | BODY MASS INDEX: 30.83 KG/M2 | HEIGHT: 67 IN

## 2021-06-30 DIAGNOSIS — H66.93 BILATERAL OTITIS MEDIA, UNSPECIFIED OTITIS MEDIA TYPE: Primary | ICD-10-CM

## 2021-06-30 PROCEDURE — 99213 OFFICE O/P EST LOW 20 MIN: CPT | Performed by: FAMILY MEDICINE

## 2021-06-30 RX ORDER — AMOXICILLIN 875 MG/1
875 TABLET, COATED ORAL 2 TIMES DAILY
Qty: 20 TABLET | Refills: 0 | Status: SHIPPED | OUTPATIENT
Start: 2021-06-30 | End: 2021-07-10

## 2021-06-30 ASSESSMENT — PATIENT HEALTH QUESTIONNAIRE - PHQ9
1. LITTLE INTEREST OR PLEASURE IN DOING THINGS: 0
SUM OF ALL RESPONSES TO PHQ QUESTIONS 1-9: 0
SUM OF ALL RESPONSES TO PHQ9 QUESTIONS 1 & 2: 0
2. FEELING DOWN, DEPRESSED OR HOPELESS: 0

## 2021-06-30 NOTE — PROGRESS NOTES
[address], [address] [place] [address] Drive                        Telephone [phone]             Fax [phone]     Enid Dahl  1970  MRN:  [id number]  Date of visit:  6/30/2021    Subjective:    Enid Dahl is a 46 y.o. female who presents to [facility] today (6/30/2021) for follow up/evaluation of:  Otalgia and Dizziness      She reports pain and pressure in the left ear for about 3 days. She denies fever or chills. She has been using Flonase nasal spray. She denies sore throat or cough. She has the following problem list:  Patient Active Problem List   Diagnosis    Anxiety    Irritable bowel syndrome with both constipation and diarrhea    DVT of popliteal vein (HCC)    Ventricular tachycardia, inducible ([address]        Current medications are:  Outpatient Medications Marked as Taking for the 6/30/21 encounter (Office Visit) with Carolynne Severance, MD   Medication Sig Dispense Refill    fluticasone (FLONASE) 50 MCG/ACT nasal spray 2 sprays by Nasal route daily 1 Bottle 3    omeprazole (PRILOSEC) 40 MG delayed release capsule Take 1 capsule by mouth daily 30 capsule 11    metoprolol succinate (TOPROL XL) 25 MG extended release tablet Take 25 mg by mouth daily      Alum Hydroxide-Mag Carbonate (GAVISCON PO) Take 2 tablets by mouth nightly. She is allergic to doxycycline calcium, meloxicam, and oxycodone-acetaminophen. She is not currently a smoker. She  reports that she quit smoking about 2 years ago. Her smoking use included cigarettes. She started smoking about 36 years ago. She has a 25.00 pack-year smoking history.  She has never used smokeless tobacco.      Objective:    Vitals:    06/30/21 1132   BP: 100/80   Site: Right Upper Arm   Position: Sitting   Cuff Size: Large Adult   Pulse: 74   Resp: 16   Temp: 97.9 °F (36.6 °C)   Weight: 196 lb 6.4 oz (89.1 kg) Height: 5' 7\" (1.702 m)     Body mass index is 30.76 kg/m². Obese female, healthy-appearing, alert, cooperative and in no acute distress. The tympanic membranes are dull and mildly erythematous bilaterally. There is no tenderness to palpation over the frontal and maxillary sinuses bilaterally. Neck supple. No adenopathy. Thyroid symmetric, normal size. Chest:  Normal expansion. Clear to auscultation. No rales, rhonchi, or wheezing. Respirations are not labored. Heart sounds are normal.  Regular rate and rhythm without murmur, gallop or rub. Assessment and Plan:    Bilateral otitis media, unspecified otitis media type  - amoxicillin (AMOXIL) 875 MG tablet; Take 1 tablet by mouth 2 times daily for 10 days  Dispense: 20 tablet; Refill: 0    She was also advised to continue Flonase. She was advised to follow up if symptoms worsen or do not resolve.        (Please note that portions of this note were completed with a voice-recognition program. Efforts were made to edit the dictation but occasionally words are mis-transcribed.)

## 2021-07-15 ENCOUNTER — PATIENT MESSAGE (OUTPATIENT)
Dept: FAMILY MEDICINE CLINIC | Age: 51
End: 2021-07-15

## 2021-07-15 DIAGNOSIS — F41.9 ANXIETY: ICD-10-CM

## 2021-07-15 RX ORDER — ALPRAZOLAM 0.25 MG/1
0.25 TABLET ORAL NIGHTLY PRN
Qty: 30 TABLET | Refills: 0 | Status: SHIPPED | OUTPATIENT
Start: 2021-07-15 | End: 2021-08-14

## 2021-07-15 NOTE — TELEPHONE ENCOUNTER
From: Wei Cano  To: Alicja Kelly MD  Sent: 7/15/2021 7:23 AM EDT  Subject: Prescription Question    Good morning Miriam! I need to get a refill on my Xanax. Can you call this into [facility] in [place] for me?

## 2021-07-29 ENCOUNTER — OFFICE VISIT (OUTPATIENT)
Dept: PRIMARY CARE CLINIC | Age: 51
End: 2021-07-29
Payer: COMMERCIAL

## 2021-07-29 ENCOUNTER — HOSPITAL ENCOUNTER (OUTPATIENT)
Dept: GENERAL RADIOLOGY | Age: 51
Discharge: HOME | End: 2021-07-31
Payer: COMMERCIAL

## 2021-07-29 VITALS
RESPIRATION RATE: 16 BRPM | WEIGHT: 199 LBS | BODY MASS INDEX: 31.23 KG/M2 | HEIGHT: 67 IN | DIASTOLIC BLOOD PRESSURE: 68 MMHG | HEART RATE: 64 BPM | SYSTOLIC BLOOD PRESSURE: 102 MMHG

## 2021-07-29 DIAGNOSIS — M25.531 WRIST PAIN, ACUTE, RIGHT: ICD-10-CM

## 2021-07-29 DIAGNOSIS — M25.531 WRIST PAIN, ACUTE, RIGHT: Primary | ICD-10-CM

## 2021-07-29 DIAGNOSIS — S63.501A SPRAIN OF RIGHT WRIST, INITIAL ENCOUNTER: ICD-10-CM

## 2021-07-29 PROCEDURE — 73110 X-RAY EXAM OF WRIST: CPT

## 2021-07-29 PROCEDURE — L3908 WHO COCK-UP NONMOLDE PRE OTS: HCPCS | Performed by: FAMILY MEDICINE

## 2021-07-29 PROCEDURE — 99213 OFFICE O/P EST LOW 20 MIN: CPT | Performed by: FAMILY MEDICINE

## 2021-07-29 RX ORDER — PREDNISONE 20 MG/1
20 TABLET ORAL 2 TIMES DAILY
Qty: 10 TABLET | Refills: 0 | Status: SHIPPED | OUTPATIENT
Start: 2021-07-29 | End: 2021-08-03

## 2021-07-29 ASSESSMENT — PATIENT HEALTH QUESTIONNAIRE - PHQ9
SUM OF ALL RESPONSES TO PHQ QUESTIONS 1-9: 0
SUM OF ALL RESPONSES TO PHQ9 QUESTIONS 1 & 2: 0
SUM OF ALL RESPONSES TO PHQ QUESTIONS 1-9: 0
SUM OF ALL RESPONSES TO PHQ QUESTIONS 1-9: 0
2. FEELING DOWN, DEPRESSED OR HOPELESS: 0
1. LITTLE INTEREST OR PLEASURE IN DOING THINGS: 0

## 2021-07-29 NOTE — PATIENT INSTRUCTIONS
Patient Education        Wrist Sprain: Care Instructions  Your Care Instructions     Your wrist hurts because you have stretched or torn ligaments, which connect the bones in your wrist.  Wrist sprains usually take from 2 to 10 weeks to heal, but some take longer. Usually, the more pain you have, the more severe your wrist sprain is and the longer it will take to heal. You can heal faster and regain strength in your wrist with good home treatment. Follow-up care is a key part of your treatment and safety. Be sure to make and go to all appointments, and call your doctor if you are having problems. It's also a good idea to know your test results and keep a list of the medicines you take. How can you care for yourself at home? · Prop up your arm on a pillow when you ice it or anytime you sit or lie down for the next 3 days. Try to keep your wrist above the level of your heart. This will help reduce swelling. · Put ice or cold packs on your wrist for 10 to 20 minutes at a time. Try to do this every 1 to 2 hours for the next 3 days (when you are awake) or until the swelling goes down. Put a thin cloth between the ice pack and your skin. · After 2 or 3 days, if your swelling is gone, apply a heating pad set on low or a warm cloth to your wrist. This helps keep your wrist flexible. Some doctors suggest that you go back and forth between hot and cold. · If you have an elastic bandage, keep it on for the next 24 to 36 hours. The bandage should be snug but not so tight that it causes numbness or tingling. To rewrap the wrist, wrap the bandage around the hand a few times, beginning at the fingers. Then wrap it around the hand between the thumb and index finger, ending by circling the wrist several times. · If your doctor gave you a splint or brace, wear it as directed to protect your wrist until it has healed. · Take pain medicines exactly as directed.   ? If the doctor gave you a prescription medicine for pain, take it as prescribed. ? If you are not taking a prescription pain medicine, ask your doctor if you can take an over-the-counter medicine. · Try not to use your injured wrist and hand. When should you call for help? Call your doctor now or seek immediate medical care if:    · Your hand or fingers are cool or pale or change color. Watch closely for changes in your health, and be sure to contact your doctor if:    · Your pain gets worse.     · Your wrist has not improved after 1 week. Where can you learn more? Go to https://ClippeQuikCycle.Yabbedoo. org and sign in to your [employer] account. Enter T803 in the CardioMind box to learn more about \"Wrist Sprain: Care Instructions. \"     If you do not have an account, please click on the \"Sign Up Now\" link. Current as of: November 16, 2020               Content Version: 12.9  © 4531-1585 Healthwise, Incorporated. Care instructions adapted under license by [facility] ([facility]). If you have questions about a medical condition or this instruction, always ask your healthcare professional. [address] any warranty or liability for your use of this information.
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
## 2021-07-30 NOTE — PROGRESS NOTES
Procedures    Procare Comfort Form Wrist Brace     Patient was prescribed a Procare Comfort Form Wrist brace. The right wrist will require stabilization / immobilization from this semi-rigid / rigid orthosis to improve their function. The orthosis will assist in protecting the affected area, provide functional support and facilitate healing. The patient was educated and fit by a healthcare professional with expert knowledge and specialization in brace application while under the direct supervision of the treating physician. Verbal and written instructions for the use of and application of this item were provided. They were instructed to contact the office immediately should the brace result in increased pain, decreased sensation, increased swelling or worsening of the condition.

## 2021-08-11 ENCOUNTER — OFFICE VISIT (OUTPATIENT)
Dept: FAMILY MEDICINE CLINIC | Age: 51
End: 2021-08-11
Payer: COMMERCIAL

## 2021-08-11 VITALS
HEIGHT: 67 IN | WEIGHT: 196 LBS | SYSTOLIC BLOOD PRESSURE: 122 MMHG | BODY MASS INDEX: 30.76 KG/M2 | DIASTOLIC BLOOD PRESSURE: 70 MMHG | HEART RATE: 68 BPM

## 2021-08-11 DIAGNOSIS — I47.29 VENTRICULAR TACHYCARDIA, INDUCIBLE: ICD-10-CM

## 2021-08-11 DIAGNOSIS — K58.2 IRRITABLE BOWEL SYNDROME WITH BOTH CONSTIPATION AND DIARRHEA: ICD-10-CM

## 2021-08-11 DIAGNOSIS — I82.431 ACUTE DEEP VEIN THROMBOSIS (DVT) OF POPLITEAL VEIN OF RIGHT LOWER EXTREMITY (HCC): ICD-10-CM

## 2021-08-11 DIAGNOSIS — K21.9 GASTROESOPHAGEAL REFLUX DISEASE WITHOUT ESOPHAGITIS: ICD-10-CM

## 2021-08-11 DIAGNOSIS — F41.9 ANXIETY: ICD-10-CM

## 2021-08-11 DIAGNOSIS — Z12.31 ENCOUNTER FOR SCREENING MAMMOGRAM FOR BREAST CANCER: Primary | ICD-10-CM

## 2021-08-11 PROCEDURE — 99214 OFFICE O/P EST MOD 30 MIN: CPT | Performed by: FAMILY MEDICINE

## 2021-08-11 RX ORDER — OMEPRAZOLE 40 MG/1
40 CAPSULE, DELAYED RELEASE ORAL DAILY
Qty: 90 CAPSULE | Refills: 3 | Status: CANCELLED | OUTPATIENT
Start: 2021-08-11

## 2021-08-11 RX ORDER — ESOMEPRAZOLE MAGNESIUM 40 MG/1
40 CAPSULE, DELAYED RELEASE ORAL DAILY
Qty: 90 CAPSULE | Refills: 3 | Status: SHIPPED | OUTPATIENT
Start: 2021-08-11 | End: 2022-08-11 | Stop reason: SDUPTHER

## 2021-08-11 RX ORDER — FLUTICASONE PROPIONATE 50 MCG
2 SPRAY, SUSPENSION (ML) NASAL DAILY
Qty: 1 BOTTLE | Refills: 3 | Status: SHIPPED | OUTPATIENT
Start: 2021-08-11 | End: 2022-08-11 | Stop reason: SDUPTHER

## 2021-08-11 ASSESSMENT — ENCOUNTER SYMPTOMS
RESPIRATORY NEGATIVE: 1
ANAL BLEEDING: 0
NAUSEA: 0
VOMITING: 0
EYES NEGATIVE: 1
ALLERGIC/IMMUNOLOGIC NEGATIVE: 1
DIARRHEA: 1
ABDOMINAL PAIN: 1

## 2021-08-11 NOTE — PROGRESS NOTES
Subjective:      Patient ID: Fifi Cloud is a 46 y.o. female. HPI     Routine follow up on chronic medical conditions. Gaining wt. Over the last year. 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 Recently trying to eliminate carbs most recently. ibs symptoms intermittent/ongoing. Some food triggers (salad). Alternating constipation and diarrhea. More generalized abdominal aches. History of  dvt, but no residual leg swelling. icd placed for monomorphic VT. No discharges. gerd variable despite daily omeprazole. Anxiety a little worse with new job. Some stressors and more travel involved. Still adjusting to the new job. Past Medical History:   Diagnosis Date    Anxiety     DVT of popliteal vein ([facility] 08/2018    right leg, following knee surgery    Reflux     Ventricular tachycardia, inducible ([address] 01/18/2019    syncope->abnormal ekg/ischemia?->normal cath. ->ep with sustained/non sustained/inducible monomorphic VT [facility]     Past Surgical History:   Procedure Laterality Date    CARDIAC DEFIBRILLATOR PLACEMENT Left 01/2019    [facility].  sustained VT, non sustained/inducible VT on EP study.  CHOLECYSTECTOMY  12/23/2010    COLONOSCOPY  06/04/2018    [facility]. [facility], single polyp cecum, diverticuli , follow up 5 years.  COLONOSCOPY N/A 6/10/2020    *COLONOSCOPY performed by Corby Sharma DO at [address] [place]  ~2000    Dr. Quin Farfan   fibroid/bleeding   [facility]  11/14/2016    KNEE SURGERY Right 07/02/2018    rt knee scope, scar debridement, MMD, patellofemoral ligament repair, microfracture of medial femoral condyle    LAPAROSCOPY  09/12/2012    Exploratory; lysis of adhesions.  UPPER GASTROINTESTINAL ENDOSCOPY  07/23/2012    Grade 2 esophagitis.     UPPER GASTROINTESTINAL ENDOSCOPY  12/17/2010    small hiatal hernia    UPPER GASTROINTESTINAL ENDOSCOPY N/A 6/10/2020    EGD BIOPSY performed by Uri Dugan DO at [facility]     Current Outpatient Medications   Medication Sig Dispense Refill    ALPRAZolam (XANAX) 0.25 MG tablet Take 1 tablet by mouth nightly as needed for Sleep for up to 30 days. 30 tablet 0    fluticasone (FLONASE) 50 MCG/ACT nasal spray 2 sprays by Nasal route daily 1 Bottle 3    omeprazole (PRILOSEC) 40 MG delayed release capsule Take 1 capsule by mouth daily 30 capsule 11    metoprolol succinate (TOPROL XL) 25 MG extended release tablet Take 25 mg by mouth daily      Alum Hydroxide-Mag Carbonate (GAVISCON PO) Take 2 tablets by mouth nightly. No current facility-administered medications for this visit. Review of Systems   Constitutional: Positive for unexpected weight change (up). HENT: Negative. Eyes: Negative. Respiratory: Negative. Cardiovascular: Negative. Gastrointestinal: Positive for abdominal pain and diarrhea. Negative for anal bleeding, nausea and vomiting. Endocrine: Negative. Genitourinary: Negative. Musculoskeletal: Negative. Skin: Negative. Allergic/Immunologic: Negative. Neurological: Negative. Hematological: Negative. Psychiatric/Behavioral: Negative. Objective:   Physical Exam  HENT:      Head: Normocephalic. Nose: Nose normal.      Mouth/Throat:      Pharynx: No oropharyngeal exudate. Eyes:      Pupils: Pupils are equal, round, and reactive to light. Cardiovascular:      Rate and Rhythm: Normal rate. Pulses: Normal pulses. Pulmonary:      Effort: Pulmonary effort is normal. No respiratory distress. Abdominal:      General: There is no distension. Palpations: There is no mass. Tenderness: There is no abdominal tenderness. There is no guarding. Musculoskeletal:         General: No swelling. Normal range of motion. Cervical back: Normal range of motion. Skin:     General: Skin is warm.    Neurological:      General: No focal deficit present. Mental Status: She is alert. Psychiatric:         Mood and Affect: Mood normal.         Behavior: Behavior normal.         Thought Content: Thought content normal.       /70 (Site: Right Upper Arm, Position: Sitting, Cuff Size: Large Adult)   Pulse 68   Ht 5' 7\" (1.702 m)   Wt 196 lb (88.9 kg)   BMI 30.70 kg/m²     Labs pending draw. Assessment:      Encounter Diagnoses   Name Primary?  Encounter for screening mammogram for breast cancer Yes    Anxiety     Gastroesophageal reflux disease without esophagitis     Ventricular tachycardia, inducible (HCC)     Acute deep vein thrombosis (DVT) of popliteal vein of right lower extremity (HCC)     Irritable bowel syndrome with both constipation and diarrhea            Plan:      Updating annual mammogram 4/22/21: negative    Anxiety: fair. Using alprazolam just prn at present. New job with some increased stressors. Gerd: trying to eat better. Intermittent perceived symptoms, reflux during the night up into the throat. Cont. Omeprazole and use tums or gaviscon . Reviewed lifestyle changes. VT:s/p icd. No discharges. On toprol. Cardiology following. Cont. Current toprol dose and cardiology follow up. dvt following knee surgery right leg. Asx, resolved. Off anticoagulation at present. No recurrent concerns. Avoiding prolonged sitting. Ibs. Alternating . Consider fiber supplement bid. Stable overall      Declines flu vaccine. Colonoscopy with mild sigmoid diverticulosis, int/ext hemorrhoids. No polyps. 10 yr follow up. Labs pending , will address as available.       Mao Marquez MD

## 2021-08-20 ENCOUNTER — OFFICE VISIT (OUTPATIENT)
Dept: PRIMARY CARE CLINIC | Age: 51
End: 2021-08-20
Payer: COMMERCIAL

## 2021-08-20 ENCOUNTER — HOSPITAL ENCOUNTER (OUTPATIENT)
Dept: INTERVENTIONAL RADIOLOGY/VASCULAR | Age: 51
Discharge: HOME | End: 2021-08-22
Payer: COMMERCIAL

## 2021-08-20 ENCOUNTER — TELEPHONE (OUTPATIENT)
Dept: FAMILY MEDICINE CLINIC | Age: 51
End: 2021-08-20

## 2021-08-20 ENCOUNTER — NURSE TRIAGE (OUTPATIENT)
Dept: OTHER | Facility: CLINIC | Age: 51
End: 2021-08-20

## 2021-08-20 VITALS
DIASTOLIC BLOOD PRESSURE: 60 MMHG | TEMPERATURE: 98 F | WEIGHT: 195.8 LBS | OXYGEN SATURATION: 98 % | HEIGHT: 67 IN | HEART RATE: 52 BPM | BODY MASS INDEX: 30.73 KG/M2 | SYSTOLIC BLOOD PRESSURE: 110 MMHG

## 2021-08-20 DIAGNOSIS — M79.661 RIGHT CALF PAIN: ICD-10-CM

## 2021-08-20 DIAGNOSIS — M79.661 RIGHT CALF PAIN: Primary | ICD-10-CM

## 2021-08-20 PROCEDURE — 99213 OFFICE O/P EST LOW 20 MIN: CPT | Performed by: FAMILY MEDICINE

## 2021-08-20 PROCEDURE — 93971 EXTREMITY STUDY: CPT

## 2021-08-20 SDOH — ECONOMIC STABILITY: FOOD INSECURITY: WITHIN THE PAST 12 MONTHS, THE FOOD YOU BOUGHT JUST DIDN'T LAST AND YOU DIDN'T HAVE MONEY TO GET MORE.: NEVER TRUE

## 2021-08-20 SDOH — ECONOMIC STABILITY: FOOD INSECURITY: WITHIN THE PAST 12 MONTHS, YOU WORRIED THAT YOUR FOOD WOULD RUN OUT BEFORE YOU GOT MONEY TO BUY MORE.: NEVER TRUE

## 2021-08-20 ASSESSMENT — SOCIAL DETERMINANTS OF HEALTH (SDOH): HOW HARD IS IT FOR YOU TO PAY FOR THE VERY BASICS LIKE FOOD, HOUSING, MEDICAL CARE, AND HEATING?: NOT HARD AT ALL

## 2021-08-20 ASSESSMENT — ENCOUNTER SYMPTOMS: BACK PAIN: 0

## 2021-08-20 NOTE — TELEPHONE ENCOUNTER
Reason for Disposition   History of prior 'blood clot' in leg or lungs (i.e., deep vein thrombosis, pulmonary embolism)    Answer Assessment - Initial Assessment Questions  1. ONSET: \"When did the pain start? \"       3 days ago    2. LOCATION: \"Where is the pain located? \"       RLE    3. PAIN: \"How bad is the pain? \"    (Scale 1-10; or mild, moderate, severe)    -  MILD (1-3): doesn't interfere with normal activities     -  MODERATE (4-7): interferes with normal activities (e.g., work or school) or awakens from sleep, limping     -  SEVERE (8-10): excruciating pain, unable to do any normal activities, unable to walk      3/10    4. WORK OR EXERCISE: \"Has there been any recent work or exercise that involved this part of the body? \"       No    5. CAUSE: \"What do you think is causing the leg pain? \"      2 injuries    6. OTHER SYMPTOMS: \"Do you have any other symptoms? \" (e.g., chest pain, back pain, breathing difficulty, swelling, rash, fever, numbness, weakness)      Mild edema, pain towards the back and lower and pain in back of knee, has had two knee surgeries    7. PREGNANCY: \"Is there any chance you are pregnant? \" \"When was your last menstrual period? \"      N/a    Protocols used: LEG PAIN-ADULT-[place]    Received call from [address] at [facility] with [facility]. Brief description of triage: previous blood clot and pain and swelling in RLE/calf pain is mild. Triage indicates for patient to go to [facility] per office- call made to PCP- Claudio Boateng states no appointments available. Care advice provided, patient verbalizes understanding; denies any other questions or concerns; instructed to call back for any new or worsening symptoms. Writer provided warm transfer to Claudio Boateng in office of PCP or appointment scheduling. Attention Provider: Thank you for allowing me to participate in the care of your patient. The patient was connected to triage in response to information provided to the [facility].   Please do not Rx pended. Please advise. Lynda Lee  to Christ Mckeon MD      7/29/21 3:34 PM  Kelly Liu,        Thanks much. I requested the Finacea GEL, 15%        Sorry for the misunderstanding.     [facility] respond through this encounter as the response is not directed to a shared pool.

## 2021-08-20 NOTE — TELEPHONE ENCOUNTER
Patient is coming to [facility] for pain and swelling in her legs. States she has a hx of DVT.

## 2021-08-20 NOTE — PROGRESS NOTES
2021     Treasure Jacobs (:  1970) is a 46 y.o. female, here for evaluation of the following medical concerns:    Leg Pain   The incident occurred 3 to 5 days ago (has had pain to the right knee and calf over the last 3 days). There was no injury mechanism. The pain is present in the right leg. The quality of the pain is described as aching. Pertinent negatives include no muscle weakness, numbness or tingling. Associated symptoms comments: No swelling to the area. Does note that she has had a history of DVT in the past.  States that she has been traveling some for work so is doing more sitting at times. . The symptoms are aggravated by weight bearing and palpation. She has tried NSAIDs for the symptoms. The treatment provided mild relief. Did review patient's med list, allergies, social history,pmhx and pshx today as noted in the record. Review of Systems   Constitutional: Negative for chills, fatigue and fever. Musculoskeletal: Positive for gait problem and myalgias. Negative for arthralgias, back pain and joint swelling. Neurological: Negative for tingling and numbness. Prior to Visit Medications    Medication Sig Taking? Authorizing Provider   fluticasone (FLONASE) 50 MCG/ACT nasal spray 2 sprays by Nasal route daily Yes Leroy Luke MD   esomeprazole (NEXIUM) 40 MG delayed release capsule Take 1 capsule by mouth daily Yes Leroy Luke MD   omeprazole (PRILOSEC) 40 MG delayed release capsule Take 1 capsule by mouth daily Yes Melissa Piña DO   metoprolol succinate (TOPROL XL) 25 MG extended release tablet Take 25 mg by mouth daily Yes Historical Provider, MD   Alum Hydroxide-Mag Carbonate (GAVISCON PO) Take 2 tablets by mouth nightly.  Yes Historical Provider, MD        Social History     Tobacco Use    Smoking status: Former Smoker     Packs/day: 1.00     Years: 25.00     Pack years: 25.00     Types: Cigarettes     Start date: 1985     Quit date: 2019     Years since quittin.5    Smokeless tobacco: Never Used   Substance Use Topics    Alcohol use: Yes     Alcohol/week: 0.0 standard drinks     Comment: occasional        Vitals:    21 1041   BP: 110/60   Pulse: 52   Temp: 98 °F (36.7 °C)   SpO2: 98%   Weight: 195 lb 12.8 oz (88.8 kg)   Height: 5' 7\" (1.702 m)     Estimated body mass index is 30.67 kg/m² as calculated from the following:    Height as of this encounter: 5' 7\" (1.702 m). Weight as of this encounter: 195 lb 12.8 oz (88.8 kg). Physical Exam  Vitals and nursing note reviewed. Constitutional:       General: She is not in acute distress. Appearance: She is well-developed. She is not diaphoretic. HENT:      Head: Normocephalic and atraumatic. Eyes:      Conjunctiva/sclera: Conjunctivae normal.   Pulmonary:      Effort: Pulmonary effort is normal.   Musculoskeletal:         General: Tenderness present. No swelling. Cervical back: Normal range of motion. Comments: Reproducible tenderness with palpation to the right popliteal region. There is a mildly positive Homans sign on the right. No edema noted. No erythema to the lower extremity. Skin:     General: Skin is warm and dry. Coloration: Skin is not pale. Findings: No erythema or rash. Neurological:      Mental Status: She is alert and oriented to person, place, and time. Psychiatric:         Behavior: Behavior normal.         Thought Content: Thought content normal.         Judgment: Judgment normal.         ASSESSMENT/PLAN:  Encounter Diagnosis   Name Primary?  Right calf pain Yes     No orders of the defined types were placed in this encounter.     Orders Placed This Encounter   Procedures    VL DUP LOWER EXTREMITY VENOUS RIGHT     Standing Status:   Future     Number of Occurrences:   1     Standing Expiration Date:   2022     Order Specific Question:   Reason for exam:     Answer:   right calf pain     VL DUP LOWER EXTREMITY VENOUS RIGHT  Narrative: EXAMINATION:  DUPLEX VENOUS ULTRASOUND OF THE RIGHT LOWER EXTREMITY, 8/20/2021 12:20 pm    TECHNIQUE:  Duplex ultrasound using B-mode/gray scaled imaging and Doppler spectral  analysis and color flow was obtained of the right lower extremity. COMPARISON:  None. HISTORY:  ORDERING SYSTEM PROVIDED HISTORY: Right calf pain  Reason for Exam: RT CALF PAIN  Acuity: Acute  Type of Exam: Initial    FINDINGS:  The visualized veins of the right lower extremity are patent and free of  echogenic thrombus. The veins demonstrate good compressibility with normal  color flow study and spectral analysis. Impression: No evidence of DVT in the right lower extremity. No evidence of acute DVT in the lower extremity. Likely muscular in nature. Would recommend NSAIDs and tylenol as needed for pain. Return  if no improvement in symptoms or if any further symptoms arise. No follow-ups on file. An electronic signature was used to authenticate this note.     --Ana Easley, DO on 8/20/2021 at 4:08 PM

## 2021-08-25 ENCOUNTER — TELEPHONE (OUTPATIENT)
Dept: FAMILY MEDICINE CLINIC | Age: 51
End: 2021-08-25

## 2021-08-25 NOTE — TELEPHONE ENCOUNTER
----- Message from Savita Flanagan sent at 8/25/2021  8:07 AM EDT -----  Subject: Appointment Request    Reason for Call: Routine Existing Condition Follow Up    QUESTIONS  Type of Appointment? Established Patient  Reason for appointment request? Available appointments did not meet   patient need  Additional Information for Provider? pt. called in to make appointment to   get on some antidepressant meds and to see about changing her stomach meds   but all appointments were to far out for her to wait she wanted a sooner   appointment please call her with an earlier appointment  ---------------------------------------------------------------------------  --------------  [address]  What is the best way for the office to contact you? OK to leave message on   voicemail  Preferred Call Back Phone Number? [phone]  ---------------------------------------------------------------------------  --------------  SCRIPT ANSWERS  Relationship to Patient? Self  (Is the patient requesting to be seen urgently for their symptoms?)? No  Is this follow up request related to routine Diabetes Management? No  Are you having any new concerns about your existing condition? No  Have you been diagnosed with, awaiting test results for, or told that you   are suspected of having COVID-19 (Coronavirus)? (If patient has tested   negative or was tested as a requirement for work, school, or travel and   not based on symptoms, answer no)? No  Do you currently have flu-like symptoms including fever or chills, cough,   shortness of breath, difficulty breathing, or new loss of taste or smell? No  Have you had close contact with someone with COVID-19 in the last 14 days? No  (Service Expert  click yes below to proceed with [employer] As Usual   Scheduling)?  Yes

## 2021-08-26 ENCOUNTER — TELEPHONE (OUTPATIENT)
Dept: OTOLARYNGOLOGY | Age: 51
End: 2021-08-26

## 2021-08-26 ENCOUNTER — OFFICE VISIT (OUTPATIENT)
Dept: FAMILY MEDICINE CLINIC | Age: 51
End: 2021-08-26
Payer: COMMERCIAL

## 2021-08-26 VITALS
BODY MASS INDEX: 30.54 KG/M2 | HEART RATE: 68 BPM | DIASTOLIC BLOOD PRESSURE: 60 MMHG | WEIGHT: 195 LBS | SYSTOLIC BLOOD PRESSURE: 110 MMHG

## 2021-08-26 DIAGNOSIS — R00.2 PALPITATIONS: ICD-10-CM

## 2021-08-26 DIAGNOSIS — K58.2 IRRITABLE BOWEL SYNDROME WITH BOTH CONSTIPATION AND DIARRHEA: ICD-10-CM

## 2021-08-26 DIAGNOSIS — F41.9 ANXIETY: Primary | ICD-10-CM

## 2021-08-26 PROCEDURE — 99214 OFFICE O/P EST MOD 30 MIN: CPT | Performed by: FAMILY MEDICINE

## 2021-08-26 RX ORDER — ALPRAZOLAM 0.25 MG/1
0.25 TABLET ORAL DAILY PRN
Qty: 30 TABLET | Refills: 1 | Status: SHIPPED | OUTPATIENT
Start: 2021-08-26 | End: 2021-11-19 | Stop reason: SDUPTHER

## 2021-08-26 RX ORDER — ALPRAZOLAM 0.25 MG/1
0.25 TABLET ORAL DAILY PRN
COMMUNITY
End: 2021-08-26 | Stop reason: SDUPTHER

## 2021-08-26 ASSESSMENT — ENCOUNTER SYMPTOMS
VOMITING: 0
ALLERGIC/IMMUNOLOGIC NEGATIVE: 1
DIARRHEA: 1
RESPIRATORY NEGATIVE: 1
ABDOMINAL PAIN: 1
NAUSEA: 0
EYES NEGATIVE: 1
ANAL BLEEDING: 0

## 2021-08-26 NOTE — TELEPHONE ENCOUNTER
Patient no showed appointment with Dr. Genny Rubin 8/26/2021. Writer attempted to contact patient to reschedule but the connection was bad and call dropped.

## 2021-08-26 NOTE — PROGRESS NOTES
Subjective:      Patient ID: Kenneth Salazar is a 46 y.o. female. HPI Acute visit for anxiety concerns. 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 Frequently distracted by interruptions, a lot of travel involved. Some concerns for heart palpitations. [place] ac[place] sob and palpitations. She couldn't tell if this was acid reflux, palpitations, or anxiety. Seeing more gi upset in the last week. Decreased appetite , nausea, green /fall smelling stools. Antibiotics last about 3-4 weeks ago. Past Medical History:   Diagnosis Date    Anxiety     DVT of popliteal vein ([address] 08/2018    right leg, following knee surgery    Reflux     Ventricular tachycardia, inducible [address] [address] 01/18/2019    syncope->abnormal ekg/ischemia?->normal cath. ->ep with sustained/non sustained/inducible monomorphic VT [facility]     Past Surgical History:   Procedure Laterality Date    CARDIAC DEFIBRILLATOR PLACEMENT Left 01/2019    [facility].  sustained VT, non sustained/inducible VT on EP study.  CHOLECYSTECTOMY  12/23/2010    COLONOSCOPY  06/04/2018    [facility], single polyp cecum, diverticuli , follow up 5 years.  COLONOSCOPY N/A 6/10/2020    *[facility] [address] [facility]  [facility]    Dr. Albania Raymond   fibroid/bleeding   Janee Christianson  11/14/2016    KNEE SURGERY Right 07/02/2018    rt knee scope, scar debridement, MMD, patellofemoral ligament repair, microfracture of medial femoral condyle    LAPAROSCOPY  09/12/2012    Exploratory; lysis of adhesions.  UPPER GASTROINTESTINAL ENDOSCOPY  07/23/2012    Grade 2 esophagitis.     UPPER GASTROINTESTINAL ENDOSCOPY  12/17/2010    small hiatal hernia    UPPER GASTROINTESTINAL ENDOSCOPY N/A 6/10/2020    EGD BIOPSY performed by Sangita Love DO at [facility] OR     Current Outpatient Medications   Medication Sig Dispense Refill    ALPRAZolam (XANAX) 0.25 MG tablet Take 0.25 mg by mouth daily as needed for Sleep or Anxiety.  fluticasone (FLONASE) 50 MCG/ACT nasal spray 2 sprays by Nasal route daily 1 Bottle 3    esomeprazole (NEXIUM) 40 MG delayed release capsule Take 1 capsule by mouth daily 90 capsule 3    metoprolol succinate (TOPROL XL) 25 MG extended release tablet Take 25 mg by mouth daily      Alum Hydroxide-Mag Carbonate (GAVISCON PO) Take 2 tablets by mouth nightly. No current facility-administered medications for this visit. Allergies   Allergen Reactions    Doxycycline Calcium Nausea And Vomiting    Meloxicam Rash    Oxycodone-Acetaminophen Rash       Review of Systems   Constitutional: Positive for unexpected weight change (up). HENT: Negative. Eyes: Negative. Respiratory: Negative. Cardiovascular: Positive for palpitations. Gastrointestinal: Positive for abdominal pain and diarrhea. Negative for anal bleeding, nausea and vomiting. Endocrine: Negative. Genitourinary: Negative. Musculoskeletal: Negative. Skin: Negative. Allergic/Immunologic: Negative. Neurological: Negative. Hematological: Negative. Psychiatric/Behavioral: The patient is nervous/anxious. Objective:   Physical Exam  Constitutional:       General: She is not in acute distress. Appearance: She is well-developed. HENT:      Head: Normocephalic and atraumatic. Right Ear: External ear normal.      Left Ear: External ear normal.      Mouth/Throat:      Pharynx: No oropharyngeal exudate. Eyes:      General: No scleral icterus. Conjunctiva/sclera: Conjunctivae normal.   Neck:      Thyroid: No thyromegaly. Cardiovascular:      Rate and Rhythm: Normal rate and regular rhythm. Heart sounds: Normal heart sounds. No murmur heard. Pulmonary:      Effort: Pulmonary effort is normal. No respiratory distress. Breath sounds: Normal breath sounds. No wheezing. Abdominal:      General: Bowel sounds are normal. There is no distension. Palpations: Abdomen is soft. Tenderness: There is no abdominal tenderness. There is no rebound. Musculoskeletal:         General: No tenderness. Normal range of motion. Cervical back: Neck supple. Skin:     General: Skin is warm and dry. Findings: No erythema or rash. Neurological:      Mental Status: She is alert and oriented to person, place, and time. Psychiatric:         Behavior: Behavior normal.         Thought Content: Thought content normal.         Judgment: Judgment normal.       /60 (Site: Right Upper Arm, Position: Sitting, Cuff Size: Large Adult)   Pulse 68   Wt 195 lb (88.5 kg)   BMI 30.54 kg/m²     Assessment / Plan:      Encounter Diagnoses   Name Primary?  Anxiety Yes    Irritable bowel syndrome with both constipation and diarrhea     Palpitations         Anxiety; mostly more work stress. Plan to restart zoloft , she has done well in the past.      IBS: -D.  Likely anxiety triggering at present. Considering pro biotic trial.  Cont. Nexium. Discussed probioitic trials and considering questran trial with meals if persistent. Palpitations. Sense of irregularity and panic. History of inducible VT s/p aicd. Every 3 month check with no concerns for arrhythmia.

## 2021-09-14 ENCOUNTER — PATIENT MESSAGE (OUTPATIENT)
Dept: FAMILY MEDICINE CLINIC | Age: 51
End: 2021-09-14

## 2021-09-14 ENCOUNTER — E-VISIT (OUTPATIENT)
Dept: FAMILY MEDICINE CLINIC | Age: 51
End: 2021-09-14
Payer: COMMERCIAL

## 2021-09-14 DIAGNOSIS — B96.89 BACTERIAL VAGINOSIS: Primary | ICD-10-CM

## 2021-09-14 DIAGNOSIS — N76.0 BACTERIAL VAGINOSIS: Primary | ICD-10-CM

## 2021-09-14 PROCEDURE — 99422 OL DIG E/M SVC 11-20 MIN: CPT | Performed by: FAMILY MEDICINE

## 2021-09-14 RX ORDER — METRONIDAZOLE 7.5 MG/G
GEL VAGINAL
Qty: 70 G | Refills: 0 | Status: SHIPPED | OUTPATIENT
Start: 2021-09-14 | End: 2022-02-10

## 2021-09-14 NOTE — TELEPHONE ENCOUNTER
I spoke to patient regarding e visit and Dr. Alivia Rodriguez recommendations. She is aware of script at [facility] and to call with concerns.

## 2021-09-14 NOTE — TELEPHONE ENCOUNTER
From: [facility]  To: Cecil Cooper MD  Sent: 9/14/2021 9:39 AM EDT  Subject: Visit Follow-Up Question    Miriam--I scheduled an Evisit and then tried to cancel it but it won't let me. I think it may be best if I come in to see someone. 2 things--can you cancel this? And does Dr Rosemary Yang have any time today to see me? If not, I will go to Urgent care.   Thanks, Liberty Newberry

## 2021-09-14 NOTE — PROGRESS NOTES
Concerns for bacterial vaginitis. Would use flagyl vaginal gel. Should not flare the IBS. Call with concerns. rx at [facility].

## 2021-09-15 RX ORDER — METRONIDAZOLE 7.5 MG/G
1 GEL VAGINAL 2 TIMES DAILY
Qty: 70 G | Refills: 1 | Status: SHIPPED | OUTPATIENT
Start: 2021-09-15 | End: 2021-09-20

## 2021-09-15 NOTE — PROGRESS NOTES
The above-named patient has requested evaluation and management services through an electronic visit by way of Waze powered by [employer] and provided by [facility]. The history of present illness provided by the patient as well as medications, allergies, past medical history have been reviewed in this electronic health record. Following evaluation and management recommendations have been made and communicated to the patient via Waze:  Good morning, Lorn Mooring. I am sorry you are having problems again with what appears to be bacterial vaginosis. I have prescribed metronidazole vaginal gel which should be applied twice a day for 5 days. This should avoid any complications with your IBS. I hope you feel better soon. Diagnoses and all orders for this visit:    Bacterial vaginosis  Comments:  See above. Orders:  -     metroNIDAZOLE (METROGEL) 0.75 % vaginal gel; Place 1 Applicatorful vaginally 2 times daily for 5 days    Other orders  -     metroNIDAZOLE (METROGEL VAGINAL) 0.75 % vaginal gel; Place vaginally one applicator full at bedtime for 7 days. 11-20 minutes were spent on the digital evaluation and management of this patient.

## 2021-10-18 RX ORDER — OMEPRAZOLE 40 MG/1
40 CAPSULE, DELAYED RELEASE ORAL DAILY
Qty: 30 CAPSULE | Refills: 11 | Status: SHIPPED | OUTPATIENT
Start: 2021-10-18 | End: 2022-02-10

## 2021-10-19 ENCOUNTER — HOSPITAL ENCOUNTER (OUTPATIENT)
Age: 51
Setting detail: SPECIMEN
Discharge: HOME | End: 2021-10-19
Payer: COMMERCIAL

## 2021-10-19 ENCOUNTER — OFFICE VISIT (OUTPATIENT)
Dept: PRIMARY CARE CLINIC | Age: 51
End: 2021-10-19
Payer: COMMERCIAL

## 2021-10-19 VITALS
HEART RATE: 61 BPM | OXYGEN SATURATION: 96 % | SYSTOLIC BLOOD PRESSURE: 120 MMHG | HEIGHT: 67 IN | DIASTOLIC BLOOD PRESSURE: 90 MMHG | TEMPERATURE: 98.4 F | WEIGHT: 194 LBS | BODY MASS INDEX: 30.45 KG/M2

## 2021-10-19 DIAGNOSIS — R51.9 NONINTRACTABLE HEADACHE, UNSPECIFIED CHRONICITY PATTERN, UNSPECIFIED HEADACHE TYPE: ICD-10-CM

## 2021-10-19 DIAGNOSIS — H66.002 NON-RECURRENT ACUTE SUPPURATIVE OTITIS MEDIA OF LEFT EAR WITHOUT SPONTANEOUS RUPTURE OF TYMPANIC MEMBRANE: Primary | ICD-10-CM

## 2021-10-19 DIAGNOSIS — R53.83 FATIGUE, UNSPECIFIED TYPE: ICD-10-CM

## 2021-10-19 DIAGNOSIS — J06.9 UPPER RESPIRATORY TRACT INFECTION, UNSPECIFIED TYPE: ICD-10-CM

## 2021-10-19 DIAGNOSIS — Z20.822 PERSON UNDER INVESTIGATION FOR COVID-19: ICD-10-CM

## 2021-10-19 DIAGNOSIS — R09.81 CONGESTION OF NASAL SINUS: ICD-10-CM

## 2021-10-19 PROCEDURE — 99213 OFFICE O/P EST LOW 20 MIN: CPT | Performed by: NURSE PRACTITIONER

## 2021-10-19 PROCEDURE — U0003 INFECTIOUS AGENT DETECTION BY NUCLEIC ACID (DNA OR RNA); SEVERE ACUTE RESPIRATORY SYNDROME CORONAVIRUS 2 (SARS-COV-2) (CORONAVIRUS DISEASE [COVID-19]), AMPLIFIED PROBE TECHNIQUE, MAKING USE OF HIGH THROUGHPUT TECHNOLOGIES AS DESCRIBED BY CMS-2020-01-R: HCPCS

## 2021-10-19 PROCEDURE — U0005 INFEC AGEN DETEC AMPLI PROBE: HCPCS

## 2021-10-19 RX ORDER — AMOXICILLIN AND CLAVULANATE POTASSIUM 875; 125 MG/1; MG/1
1 TABLET, FILM COATED ORAL 2 TIMES DAILY
Qty: 14 TABLET | Refills: 0 | Status: SHIPPED | OUTPATIENT
Start: 2021-10-19 | End: 2021-10-26

## 2021-10-19 ASSESSMENT — PATIENT HEALTH QUESTIONNAIRE - PHQ9
SUM OF ALL RESPONSES TO PHQ QUESTIONS 1-9: 0
1. LITTLE INTEREST OR PLEASURE IN DOING THINGS: 0
SUM OF ALL RESPONSES TO PHQ QUESTIONS 1-9: 0
SUM OF ALL RESPONSES TO PHQ QUESTIONS 1-9: 0
2. FEELING DOWN, DEPRESSED OR HOPELESS: 0
SUM OF ALL RESPONSES TO PHQ9 QUESTIONS 1 & 2: 0

## 2021-10-19 ASSESSMENT — ENCOUNTER SYMPTOMS
SORE THROAT: 1
VOMITING: 0
DIARRHEA: 0
SHORTNESS OF BREATH: 0
COUGH: 0
WHEEZING: 0
NAUSEA: 0

## 2021-10-19 NOTE — LETTER
[address] [facility] A department of [facility]  Phone: [phone]  Fax: [phone]    SHERRI Hdez CNP        October 19, 2021     Patient: Faheem Encinas   YOB: 1970   Date of Visit: 10/19/2021       To Whom it May Concern:    Linda Mariano was seen in my clinic on 10/19/2021. She may return to work after a negative covid test result (results expected in appx 1-3 days) and marked symptom improvement. Pt should be fever free for 24 hours without medication. If you have any questions or concerns, please don't hesitate to call.     Sincerely,         SHERRI Hdez CNP

## 2021-10-19 NOTE — PROGRESS NOTES
[address] [place] [address]  Dept: [phone]  Dept Fax: [phone]  Loc: [phone]        CHIEF COMPLAINT       Chief Complaint   Patient presents with    Headache     SX include ear pain , sinus pain and pressure,sore throat sx began 2 days ago        Nurses Notes reviewed and I agree except as noted in the HPI. HISTORY OF PRESENT ILLNESS   Faheem Encinas is a 46 y.o. female who presents to [facility] today (10/20/2021) for evaluation of:   Otalgia   There is pain in the left ear. This is a new problem. The current episode started in the past 7 days (10/14/21). The problem has been gradually worsening. There has been no fever. Associated symptoms include headaches and a sore throat. Pertinent negatives include no coughing, diarrhea, ear discharge or vomiting. Treatments tried: flonase, loratidine. The treatment provided no relief. Denies any ill contacts. Pt recently flew from [place]. Pt is vaccinated against covid, no past hx of covid. REVIEW OF SYSTEMS     Review of Systems   Constitutional: Positive for fatigue (mild). Negative for fever. HENT: Positive for congestion, ear pain (left) and sore throat. Negative for ear discharge. Respiratory: Negative for cough, shortness of breath and wheezing. Gastrointestinal: Negative for diarrhea, nausea and vomiting. Musculoskeletal: Negative for myalgias. Neurological: Positive for dizziness (intermittent) and headaches. PAST MEDICAL HISTORY         Diagnosis Date    Anxiety     DVT of popliteal vein ([address] 08/2018    right leg, following knee surgery    Reflux     Ventricular tachycardia, inducible ([address] 01/18/2019    syncope->abnormal ekg/ischemia?->normal cath. ->ep with sustained/non sustained/inducible monomorphic VT [facility]       SURGICAL HISTORY     Patient  has a past surgical history that includes Dilation and curettage of uterus ([facility]); laparoscopy (09/12/2012); Upper gastrointestinal endoscopy (07/23/2012); Cholecystectomy (12/23/2010); Upper gastrointestinal endoscopy (12/17/2010); knee surgery (11/14/2016); knee surgery (Right, 07/02/2018); Endometrial ablation (~2000); Colonoscopy (06/04/2018); Cardiac defibrillator placement (Left, 01/2019); Upper gastrointestinal endoscopy (N/A, 6/10/2020); and Colonoscopy (N/A, 6/10/2020). CURRENT MEDICATIONS       Outpatient Medications Prior to Visit   Medication Sig Dispense Refill    sertraline (ZOLOFT) 50 MG tablet Take 1 tablet by mouth daily 90 tablet 3    fluticasone (FLONASE) 50 MCG/ACT nasal spray 2 sprays by Nasal route daily 1 Bottle 3    esomeprazole (NEXIUM) 40 MG delayed release capsule Take 1 capsule by mouth daily 90 capsule 3    metoprolol succinate (TOPROL XL) 25 MG extended release tablet Take 25 mg by mouth daily      Alum Hydroxide-Mag Carbonate (GAVISCON PO) Take 2 tablets by mouth nightly.  omeprazole (PRILOSEC) 40 MG delayed release capsule Take 1 capsule by mouth daily (Patient not taking: Reported on 10/19/2021) 30 capsule 11    metroNIDAZOLE (METROGEL VAGINAL) 0.75 % vaginal gel Place vaginally one applicator full at bedtime for 7 days. (Patient not taking: Reported on 10/19/2021) 70 g 0     No facility-administered medications prior to visit. ALLERGIES     Patient is is allergic to doxycycline calcium, meloxicam, and oxycodone-acetaminophen. FAMILY HISTORY     Patient's family history includes Cancer in her father; Heart Disease in her father and another family member; High Blood Pressure in her father and another family member; Kidney Disease in her father; Other in her child and mother; Stroke in her father. SOCIAL HISTORY     Patient  reports that she quit smoking about 2 years ago. Her smoking use included cigarettes. She started smoking about 36 years ago.  She has a 25.00 pack-year smoking history. She has never used smokeless tobacco. She reports current alcohol use. She reports that she does not use drugs. PHYSICAL EXAM     VITALS  BP: (!) 120/90, Temp: 98.4 °F (36.9 °C), Pulse: 61,  , SpO2: 96 %  Physical Exam  Vitals reviewed. Constitutional:       General: She is not in acute distress. Appearance: She is not ill-appearing. HENT:      Right Ear: Tympanic membrane and ear canal normal.      Left Ear: Ear canal normal. Tympanic membrane is erythematous and retracted. Nose: Congestion present. No rhinorrhea. Mouth/Throat:      Mouth: Mucous membranes are moist.      Pharynx: Oropharynx is clear. No oropharyngeal exudate or posterior oropharyngeal erythema. Cardiovascular:      Rate and Rhythm: Normal rate and regular rhythm. Heart sounds: Normal heart sounds, S1 normal and S2 normal. No murmur heard. Pulmonary:      Effort: Pulmonary effort is normal. No accessory muscle usage or respiratory distress. Breath sounds: Normal breath sounds. No wheezing or rhonchi. Musculoskeletal:      Cervical back: Normal range of motion and neck supple. Lymphadenopathy:      Head:      Left side of head: Preauricular (tender) adenopathy present. Cervical: Cervical adenopathy present. Right cervical: Superficial cervical adenopathy present. Left cervical: Superficial cervical adenopathy present. Skin:     General: Skin is warm and dry. Capillary Refill: Capillary refill takes less than 2 seconds. Neurological:      General: No focal deficit present. Mental Status: She is alert. DIAGNOSTIC RESULTS   Labs:No results found for this visit on 10/19/21.     IMAGING:        CLINICAL COURSE:     Vitals:    10/19/21 1658   BP: (!) 120/90   Site: Left Upper Arm   Position: Sitting   Cuff Size: Medium Adult   Pulse: 61   Temp: 98.4 °F (36.9 °C)   TempSrc: Tympanic   SpO2: 96%   Weight: 194 lb (88 kg)   Height: 5' 7\" (1.702 m) PROCEDURES:  None  FINAL IMPRESSION      1. Non-recurrent acute suppurative otitis media of left ear without spontaneous rupture of tympanic membrane    2. Congestion of nasal sinus    3. Fatigue, unspecified type    4. Nonintractable headache, unspecified chronicity pattern, unspecified headache type    5. Upper respiratory tract infection, unspecified type    6. Person under investigation for COVID-19         DISPOSITION/PLAN   Augmentin course of left AOM. Covid testing collected and quarantine guidelines reviewed; will notify as soon as results are available. Discussed supportive measures for symptom relief and educated pt on s/s that warrant return to clinic. Encouraged pt to return to clinic should symptoms worsen or any concerns arise. The use, risks, benefits, and potential side effects of prescribed and/or recommended medications were discussed. All questions were answered and the patient/caregiver voiced understanding. Patient Instructions     Will notify you of covid test result as soon as available. You should isolate at home in an area away from family. If you must be around family members, please wear a mask. Quarantine at home until result is available. This means do not go to work/school, attend family gatherings, or invite others to your home until you know your test results. A work/school note will be provided for you. Augmentin for left ear infection. Take the full course even if feeling better. May take with food if stomache upset occurs. The following are measures you can try at home to help provide symptom relief:    --Increase your water intake to help thin secretions and maintain hydration. --May try mucinex (guaifenesin) to help with congestion and robitussin (dextromethorphan) for persistent cough. Use cool mist humidifier at bedtime to moisturize air. Use nasal saline rinse as needed for congestion. --Tylenol or ibuprofen for fever/body aches/headache. Warm/cold packs to forehead and back of neck can help with headache pain. Warm baths/showers can sooth body aches also. Practice good hand hygiene and cover your cough and sneeze to prevent passing virus on. If symptoms worsen or fail to improve, please return to clinic. If you develop severe worsening of symptoms such as chest pain or difficulty breathing, please go to ER. Patient Education        Ear Infection (Otitis Media): Care Instructions  Overview     An ear infection may start with a cold and affect the middle ear (otitis media). It can hurt a lot. Most ear infections clear up on their own in a couple of days and do not need antibiotics. Also, antibiotics do not work against viruses, which may be the cause of your infection. Regular doses of pain relievers are the best way to reduce your fever and help you feel better. Follow-up care is a key part of your treatment and safety. Be sure to make and go to all appointments, and call your doctor if you are having problems. It's also a good idea to know your test results and keep a list of the medicines you take. How can you care for yourself at home? · Take pain medicines exactly as directed. ? If the doctor gave you a prescription medicine for pain, take it as prescribed. ? If you are not taking a prescription pain medicine, take an over-the-counter medicine, such as acetaminophen (Tylenol), ibuprofen (Advil, Motrin), or naproxen (Aleve). Read and follow all instructions on the label. ? Do not take two or more pain medicines at the same time unless the doctor told you to. Many pain medicines have acetaminophen, which is Tylenol. Too much acetaminophen (Tylenol) can be harmful. · Plan to take a full dose of pain reliever before bedtime. Getting enough sleep will help you get better. · Try a warm, moist washcloth on the ear. It may help relieve pain. · If your doctor prescribed antibiotics, take them as directed.  Do not stop taking them just because you feel better. You need to take the full course of antibiotics. When should you call for help? Call your doctor now or seek immediate medical care if:    · You have new or increasing ear pain.     · You have new or increasing pus or blood draining from your ear.     · You have a fever with a stiff neck or a severe headache. Watch closely for changes in your health, and be sure to contact your doctor if:    · You have new or worse symptoms.     · You are not getting better after taking an antibiotic for 2 days. Where can you learn more? Go to https://Magnolia Medical Technologiespepiceweb.Podcast Ready. org and sign in to your [employer] account. Enter F529 in the Pintley box to learn more about \"Ear Infection (Otitis Media): Care Instructions. \"     If you do not have an account, please click on the \"Sign Up Now\" link. Current as of: December 2, 2020               Content Version: 13.0  © 2006-2021 [employer] Computing. Care instructions adapted under license by [facility] ([facility]). If you have questions about a medical condition or this instruction, always ask your healthcare professional. [place] [address] any warranty or liability for your use of this information. Patient Education        Upper Respiratory Infection (Cold): Care Instructions  Your Care Instructions     An upper respiratory infection, or URI, is an infection of the nose, sinuses, or throat. URIs are spread by coughs, sneezes, and direct contact. The common cold is the most frequent kind of URI. The flu and sinus infections are other kinds of URIs. Almost all URIs are caused by viruses. Antibiotics won't cure them. But you can treat most infections with home care. This may include drinking lots of fluids and taking over-the-counter pain medicine. You will probably feel better in 4 to 10 days. The doctor has checked you carefully, but problems can develop later.  If you notice any problems or new symptoms, get medical treatment right away. Follow-up care is a key part of your treatment and safety. Be sure to make and go to all appointments, and call your doctor if you are having problems. It's also a good idea to know your test results and keep a list of the medicines you take. How can you care for yourself at home? · To prevent dehydration, drink plenty of fluids. Choose water and other clear liquids until you feel better. If you have kidney, heart, or liver disease and have to limit fluids, talk with your doctor before you increase the amount of fluids you drink. · Take an over-the-counter pain medicine, such as acetaminophen (Tylenol), ibuprofen (Advil, Motrin), or naproxen (Aleve). Read and follow all instructions on the label. · Before you use cough and cold medicines, check the label. These medicines may not be safe for young children or for people with certain health problems. · Be careful when taking over-the-counter cold or flu medicines and Tylenol at the same time. Many of these medicines have acetaminophen, which is Tylenol. Read the labels to make sure that you are not taking more than the recommended dose. Too much acetaminophen (Tylenol) can be harmful. · Get plenty of rest.  · Do not smoke or allow others to smoke around you. If you need help quitting, talk to your doctor about stop-smoking programs and medicines. These can increase your chances of quitting for good. When should you call for help? Call 911 anytime you think you may need emergency care. For example, call if:    · You have severe trouble breathing. Call your doctor now or seek immediate medical care if:    · You seem to be getting much sicker.     · You have new or worse trouble breathing.     · You have a new or higher fever.     · You have a new rash.    Watch closely for changes in your health, and be sure to contact your doctor if:    · You have a new symptom, such as a sore throat, an earache, or sinus pain.     · You cough more deeply or more often, especially if you notice more mucus or a change in the color of your mucus.     · You do not get better as expected. Where can you learn more? Go to https://CurvopeGencore Systems.SolarBuddy. org and sign in to your [employer] account. Enter U482 in the Cloudmark box to learn more about \"Upper Respiratory Infection (Cold): Care Instructions. \"     If you do not have an account, please click on the \"Sign Up Now\" link. Current as of: July 6, 2021               Content Version: 13.0  © 8100-5024 SubtleData. Care instructions adapted under license by [facility] ([facility]). If you have questions about a medical condition or this instruction, always ask your healthcare professional. [address] any warranty or liability for your use of this information. Orders Placed This Encounter   Procedures    COVID-19     Standing Status:   Future     Number of Occurrences:   1     Standing Expiration Date:   11/19/2021     Scheduling Instructions:      1) Due to current limited availability of the COVID-19 test, tests will be prioritized based on responses to questions above. Testing may be delayed due to volume. 2) Print and instruct patient to adhere to CDC home isolation program. (Link Above)              3) Set up or refer patient for a monitoring program.              4) Have patient sign up for and leverage [employer] (if not previously done). Order Specific Question:   Is this test for diagnosis or screening? Answer:   Diagnosis of ill patient     Order Specific Question:   Symptomatic for COVID-19 as defined by CDC? Answer:   Yes     Order Specific Question:   Date of Symptom Onset     Answer:   10/14/2021     Order Specific Question:   Hospitalized for COVID-19? Answer:   No     Order Specific Question:   Admitted to ICU for COVID-19? Answer:   No     Order Specific Question:   Employed in healthcare setting? Answer:   No     Order Specific Question:   Resident in a congregate (group) care setting? Answer:   No     Order Specific Question:   Pregnant? Answer:   No     Order Specific Question:   Previously tested for COVID-19? Answer:   Yes     Outpatient Encounter Medications as of 10/19/2021   Medication Sig Dispense Refill    amoxicillin-clavulanate (AUGMENTIN) 875-125 MG per tablet Take 1 tablet by mouth 2 times daily for 7 days 14 tablet 0    sertraline (ZOLOFT) 50 MG tablet Take 1 tablet by mouth daily 90 tablet 3    fluticasone (FLONASE) 50 MCG/ACT nasal spray 2 sprays by Nasal route daily 1 Bottle 3    esomeprazole (NEXIUM) 40 MG delayed release capsule Take 1 capsule by mouth daily 90 capsule 3    metoprolol succinate (TOPROL XL) 25 MG extended release tablet Take 25 mg by mouth daily      Alum Hydroxide-Mag Carbonate (GAVISCON PO) Take 2 tablets by mouth nightly.  omeprazole (PRILOSEC) 40 MG delayed release capsule Take 1 capsule by mouth daily (Patient not taking: Reported on 10/19/2021) 30 capsule 11    metroNIDAZOLE (METROGEL VAGINAL) 0.75 % vaginal gel Place vaginally one applicator full at bedtime for 7 days. (Patient not taking: Reported on 10/19/2021) 70 g 0     No facility-administered encounter medications on file as of 10/19/2021. No follow-ups on file.                 Electronically signed by SHERRI Bermudez CNP on 10/20/2021 at 12:25 PM

## 2021-10-19 NOTE — PATIENT INSTRUCTIONS
are having problems. It's also a good idea to know your test results and keep a list of the medicines you take. How can you care for yourself at home? · Take pain medicines exactly as directed. ? If the doctor gave you a prescription medicine for pain, take it as prescribed. ? If you are not taking a prescription pain medicine, take an over-the-counter medicine, such as acetaminophen (Tylenol), ibuprofen (Advil, Motrin), or naproxen (Aleve). Read and follow all instructions on the label. ? Do not take two or more pain medicines at the same time unless the doctor told you to. Many pain medicines have acetaminophen, which is Tylenol. Too much acetaminophen (Tylenol) can be harmful. · Plan to take a full dose of pain reliever before bedtime. Getting enough sleep will help you get better. · Try a warm, moist washcloth on the ear. It may help relieve pain. · If your doctor prescribed antibiotics, take them as directed. Do not stop taking them just because you feel better. You need to take the full course of antibiotics. When should you call for help? Call your doctor now or seek immediate medical care if:    · You have new or increasing ear pain.     · You have new or increasing pus or blood draining from your ear.     · You have a fever with a stiff neck or a severe headache. Watch closely for changes in your health, and be sure to contact your doctor if:    · You have new or worse symptoms.     · You are not getting better after taking an antibiotic for 2 days. Where can you learn more? Go to https://Yuepu SifangsonidoUniverstar Science & Technology.Marketo Japan. org and sign in to your "[employer]." account. Enter I786 in the [facility] box to learn more about \"Ear Infection (Otitis Media): Care Instructions. \"     If you do not have an account, please click on the \"Sign Up Now\" link. Current as of: December 2, 2020               Content Version: 13.0  © 5606-9646 Healthwise, Incorporated.    Care instructions adapted under license by [facility] ([facility]). If you have questions about a medical condition or this instruction, always ask your healthcare professional. [place] [address] any warranty or liability for your use of this information. Patient Education        Upper Respiratory Infection (Cold): Care Instructions  Your Care Instructions     An upper respiratory infection, or URI, is an infection of the nose, sinuses, or throat. URIs are spread by coughs, sneezes, and direct contact. The common cold is the most frequent kind of URI. The flu and sinus infections are other kinds of URIs. Almost all URIs are caused by viruses. Antibiotics won't cure them. But you can treat most infections with home care. This may include drinking lots of fluids and taking over-the-counter pain medicine. You will probably feel better in 4 to 10 days. The doctor has checked you carefully, but problems can develop later. If you notice any problems or new symptoms, get medical treatment right away. Follow-up care is a key part of your treatment and safety. Be sure to make and go to all appointments, and call your doctor if you are having problems. It's also a good idea to know your test results and keep a list of the medicines you take. How can you care for yourself at home? · To prevent dehydration, drink plenty of fluids. Choose water and other clear liquids until you feel better. If you have kidney, heart, or liver disease and have to limit fluids, talk with your doctor before you increase the amount of fluids you drink. · Take an over-the-counter pain medicine, such as acetaminophen (Tylenol), ibuprofen (Advil, Motrin), or naproxen (Aleve). Read and follow all instructions on the label. · Before you use cough and cold medicines, check the label. These medicines may not be safe for young children or for people with certain health problems.   · Be careful when taking over-the-counter cold or flu medicines and Tylenol at the same time. Many of these medicines have acetaminophen, which is Tylenol. Read the labels to make sure that you are not taking more than the recommended dose. Too much acetaminophen (Tylenol) can be harmful. · Get plenty of rest.  · Do not smoke or allow others to smoke around you. If you need help quitting, talk to your doctor about stop-smoking programs and medicines. These can increase your chances of quitting for good. When should you call for help? Call 911 anytime you think you may need emergency care. For example, call if:    · You have severe trouble breathing. Call your doctor now or seek immediate medical care if:    · You seem to be getting much sicker.     · You have new or worse trouble breathing.     · You have a new or higher fever.     · You have a new rash. Watch closely for changes in your health, and be sure to contact your doctor if:    · You have a new symptom, such as a sore throat, an earache, or sinus pain.     · You cough more deeply or more often, especially if you notice more mucus or a change in the color of your mucus.     · You do not get better as expected. Where can you learn more? Go to https://Yield SoftwarepeNo Paper Just Vaporeweb.Santaro Interactive Entertainment (STIE). org and sign in to your Paperless World account. Enter V342 in the Collaborative Software Initiative box to learn more about \"Upper Respiratory Infection (Cold): Care Instructions. \"     If you do not have an account, please click on the \"Sign Up Now\" link. Current as of: July 6, 2021               Content Version: 13.0  © 2006-2021 Healthwise, Incorporated. Care instructions adapted under license by [facility] ([facility]). If you have questions about a medical condition or this instruction, always ask your healthcare professional. [place] [address] any warranty or liability for your use of this information.

## 2021-10-21 LAB
SARS-COV-2: NORMAL
SARS-COV-2: NOT DETECTED
SOURCE: NORMAL

## 2021-10-25 ENCOUNTER — PATIENT MESSAGE (OUTPATIENT)
Dept: FAMILY MEDICINE CLINIC | Age: 51
End: 2021-10-25

## 2021-10-25 RX ORDER — FLUCONAZOLE 100 MG/1
TABLET ORAL
Qty: 2 TABLET | Refills: 0 | Status: SHIPPED | OUTPATIENT
Start: 2021-10-25 | End: 2022-02-10

## 2021-10-25 NOTE — TELEPHONE ENCOUNTER
From: Faheem Encinas  To: Zachary Crawley MD  Sent: 10/25/2021 2:39 PM EDT  Subject: Non-Urgent Medical Question    Miriam-I was prescribed augmentin last week and unfortunately, I believe I now have a yeast infection. Can you send in a prescription for 2 Diflucans or do I need to be seen?  Thanks The Lucio

## 2021-11-19 ENCOUNTER — PATIENT MESSAGE (OUTPATIENT)
Dept: FAMILY MEDICINE CLINIC | Age: 51
End: 2021-11-19

## 2021-11-19 DIAGNOSIS — F41.9 ANXIETY: ICD-10-CM

## 2021-11-19 RX ORDER — ALPRAZOLAM 0.25 MG/1
0.25 TABLET ORAL DAILY PRN
Qty: 30 TABLET | Refills: 0 | Status: SHIPPED | OUTPATIENT
Start: 2021-11-19 | End: 2022-02-21 | Stop reason: SDUPTHER

## 2021-11-19 NOTE — TELEPHONE ENCOUNTER
Kaleigh Irene called requesting a refill of the below medication which has been pended for you: last filled 8/26/2021 #30    Requested Prescriptions     Pending Prescriptions Disp Refills    ALPRAZolam (XANAX) 0.25 MG tablet 30 tablet 0     Sig: Take 1 tablet by mouth daily as needed for Sleep or Anxiety for up to 30 days.        Last Appointment Date: 8/26/2021  Next Appointment Date: 2/9/2022    Allergies   Allergen Reactions    Doxycycline Calcium Nausea And Vomiting    Meloxicam Rash    Oxycodone-Acetaminophen Rash

## 2021-11-19 NOTE — TELEPHONE ENCOUNTER
From: Geovani Woodard  Sent: 11/19/2021 11:23 AM EST  To: [place] Clinical Staff  Subject: RE: Prescription Question    The visit discussion can wait. ...... the Xanax I need refilled today if possible. Thanks.

## 2022-02-10 ENCOUNTER — OFFICE VISIT (OUTPATIENT)
Dept: FAMILY MEDICINE CLINIC | Age: 52
End: 2022-02-10
Payer: COMMERCIAL

## 2022-02-10 VITALS
DIASTOLIC BLOOD PRESSURE: 68 MMHG | HEART RATE: 68 BPM | SYSTOLIC BLOOD PRESSURE: 118 MMHG | WEIGHT: 196 LBS | HEIGHT: 67 IN | BODY MASS INDEX: 30.76 KG/M2

## 2022-02-10 DIAGNOSIS — K58.2 IRRITABLE BOWEL SYNDROME WITH BOTH CONSTIPATION AND DIARRHEA: ICD-10-CM

## 2022-02-10 DIAGNOSIS — I47.29 VENTRICULAR TACHYCARDIA, INDUCIBLE: ICD-10-CM

## 2022-02-10 DIAGNOSIS — F41.9 ANXIETY: Primary | ICD-10-CM

## 2022-02-10 DIAGNOSIS — I82.431 ACUTE DEEP VEIN THROMBOSIS (DVT) OF POPLITEAL VEIN OF RIGHT LOWER EXTREMITY (HCC): ICD-10-CM

## 2022-02-10 DIAGNOSIS — K21.9 GASTROESOPHAGEAL REFLUX DISEASE WITHOUT ESOPHAGITIS: ICD-10-CM

## 2022-02-10 PROCEDURE — 99214 OFFICE O/P EST MOD 30 MIN: CPT | Performed by: FAMILY MEDICINE

## 2022-02-10 ASSESSMENT — ENCOUNTER SYMPTOMS
RESPIRATORY NEGATIVE: 1
ALLERGIC/IMMUNOLOGIC NEGATIVE: 1
CONSTIPATION: 1
ABDOMINAL PAIN: 0
NAUSEA: 0
ANAL BLEEDING: 0
VOMITING: 0
EYES NEGATIVE: 1
DIARRHEA: 1

## 2022-02-10 NOTE — PROGRESS NOTES
Subjective:      Patient ID: Lars Shultz is a 46 y.o. female. HPI     Routine follow up on chronic medical conditions. Gaining wt. Over the last year. 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 Menopause ongoing. Decreased libido. ibs symptoms intermittent/ongoing. Some food triggers (salad). Alternating constipation and diarrhea. More generalized abdominal aches. History of  dvt, but no residual leg swelling. icd placed for monomorphic VT. No discharges. gerd variable despite daily omeprazole. Anxiety a little worse with new job. Some stressors and more travel involved. Still adjusting to the new job. Past Medical History:   Diagnosis Date    Anxiety     DVT of popliteal vein ([facility] [address] 08/2018    right leg, following knee surgery    Reflux     Ventricular tachycardia, inducible ([facility] [address] 01/18/2019    syncope->abnormal ekg/ischemia?->normal cath. ->ep with sustained/non sustained/inducible monomorphic VT [facility]     Past Surgical History:   Procedure Laterality Date    CARDIAC DEFIBRILLATOR PLACEMENT Left 01/2019    [facility].  sustained VT, non sustained/inducible VT on EP study.  CHOLECYSTECTOMY  12/23/2010    COLONOSCOPY  06/04/2018    [facility], single polyp cecum, diverticuli , follow up 5 years.  COLONOSCOPY N/A 6/10/2020    *COLONOSCOPY performed by Fco Stallworth DO at [address] [facility]  ~2000    Dr. Sonia Tovar   fibroid/bleeding   [place]  11/14/2016    KNEE SURGERY Right 07/02/2018    rt knee scope, scar debridement, MMD, patellofemoral ligament repair, microfracture of medial femoral condyle    LAPAROSCOPY  09/12/2012    Exploratory; lysis of adhesions.  UPPER GASTROINTESTINAL ENDOSCOPY  07/23/2012    Grade 2 esophagitis.     UPPER GASTROINTESTINAL ENDOSCOPY  12/17/2010    small hiatal hernia    UPPER GASTROINTESTINAL ENDOSCOPY N/A 6/10/2020    EGD BIOPSY performed by Darlean Heimlich, DO at [facility] OR     Current Outpatient Medications   Medication Sig Dispense Refill    fluticasone (FLONASE) 50 MCG/ACT nasal spray 2 sprays by Nasal route daily 1 Bottle 3    esomeprazole (NEXIUM) 40 MG delayed release capsule Take 1 capsule by mouth daily 90 capsule 3    metoprolol succinate (TOPROL XL) 25 MG extended release tablet Take 25 mg by mouth daily      Alum Hydroxide-Mag Carbonate (GAVISCON PO) Take 2 tablets by mouth nightly. No current facility-administered medications for this visit. Review of Systems   Constitutional: Positive for unexpected weight change (up). HENT: Negative. Eyes: Negative. Respiratory: Negative. Cardiovascular: Negative. Gastrointestinal: Positive for constipation and diarrhea. Negative for abdominal pain, anal bleeding, nausea and vomiting. Endocrine: Negative. Genitourinary: Negative. Musculoskeletal: Negative. Skin: Negative. Allergic/Immunologic: Negative. Neurological: Negative. Hematological: Negative. Psychiatric/Behavioral: Negative. Objective:   Physical Exam  HENT:      Head: Normocephalic. Nose: Nose normal.      Mouth/Throat:      Pharynx: No oropharyngeal exudate. Eyes:      Pupils: Pupils are equal, round, and reactive to light. Cardiovascular:      Rate and Rhythm: Normal rate. Pulses: Normal pulses. Pulmonary:      Effort: Pulmonary effort is normal. No respiratory distress. Abdominal:      General: There is no distension. Palpations: There is no mass. Tenderness: There is no abdominal tenderness. There is no guarding. Musculoskeletal:         General: No swelling. Normal range of motion. Cervical back: Normal range of motion. Skin:     General: Skin is warm. Neurological:      General: No focal deficit present. Mental Status: She is alert.    Psychiatric:         Mood and Affect: Mood normal.         Behavior: Behavior normal.         Thought Content: Thought content normal.       /68 (Site: Right Upper Arm, Position: Sitting, Cuff Size: Large Adult)   Pulse 68   Ht 5' 7\" (1.702 m)   Wt 196 lb (88.9 kg)   BMI 30.70 kg/m²     Labs pending draw. Assessment:      Encounter Diagnoses   Name Primary?  Anxiety Yes    Gastroesophageal reflux disease without esophagitis     Ventricular tachycardia, inducible (HCC)     Acute deep vein thrombosis (DVT) of popliteal vein of right lower extremity (HCC)     Irritable bowel syndrome with both constipation and diarrhea          Plan:      Updating annual mammogram 4/22/21: negative    Anxiety: fair. Using alprazolam just prn at present. New job with some increased stressors. Needing refilling . Still using prn /overwhelmed. Gerd: trying to eat better. Intermittent perceived symptoms, reflux during the night up into the throat. Cont. Omeprazole and use tums or gaviscon . Reviewed lifestyle changes. VT:s/p icd. No discharges. On toprol. Cardiology following. Cont. Current toprol dose and cardiology follow up. dvt following knee surgery right leg. Asx, resolved. Off anticoagulation at present. No recurrent concerns. Avoiding prolonged sitting. Ibs. Alternating . Consider fiber supplement bid. Stable overall      Declines flu vaccine. Colonoscopy with mild sigmoid diverticulosis, int/ext hemorrhoids. No polyps. 10 yr follow up. Labs pending , will address as available.   Update pap with next exam.      Jessica Sharpe MD

## 2022-02-21 ENCOUNTER — PATIENT MESSAGE (OUTPATIENT)
Dept: FAMILY MEDICINE CLINIC | Age: 52
End: 2022-02-21

## 2022-02-21 DIAGNOSIS — F41.9 ANXIETY: ICD-10-CM

## 2022-02-21 RX ORDER — ALPRAZOLAM 0.25 MG/1
0.25 TABLET ORAL DAILY PRN
Qty: 30 TABLET | Refills: 0 | Status: SHIPPED | OUTPATIENT
Start: 2022-02-21 | End: 2022-03-23

## 2022-02-21 NOTE — TELEPHONE ENCOUNTER
From: Yusra Jason  To: Dr. Tito Elena: 2/21/2022 9:00 AM EST  Subject: Xanax refill    Miriam--when I was in last week, Dr. Bryon Howe was to refill my Xanax and I don't believe he sent it to [facility]. Can you do this for me? Thanks!

## 2022-03-14 ENCOUNTER — HOSPITAL ENCOUNTER (OUTPATIENT)
Age: 52
Setting detail: SPECIMEN
Discharge: HOME | End: 2022-03-14
Payer: COMMERCIAL

## 2022-03-14 ENCOUNTER — OFFICE VISIT (OUTPATIENT)
Dept: FAMILY MEDICINE CLINIC | Age: 52
End: 2022-03-14
Payer: COMMERCIAL

## 2022-03-14 VITALS
WEIGHT: 196 LBS | SYSTOLIC BLOOD PRESSURE: 124 MMHG | HEART RATE: 72 BPM | HEIGHT: 67 IN | BODY MASS INDEX: 30.76 KG/M2 | DIASTOLIC BLOOD PRESSURE: 70 MMHG

## 2022-03-14 DIAGNOSIS — N94.10 DYSPAREUNIA IN FEMALE: Primary | ICD-10-CM

## 2022-03-14 DIAGNOSIS — Z86.018 HISTORY OF UTERINE FIBROID: ICD-10-CM

## 2022-03-14 DIAGNOSIS — N94.9 CERVICAL MOTION TENDERNESS: ICD-10-CM

## 2022-03-14 DIAGNOSIS — N89.8 VAGINAL DISCHARGE: ICD-10-CM

## 2022-03-14 DIAGNOSIS — N94.10 DYSPAREUNIA IN FEMALE: ICD-10-CM

## 2022-03-14 PROCEDURE — 87480 CANDIDA DNA DIR PROBE: CPT

## 2022-03-14 PROCEDURE — 99214 OFFICE O/P EST MOD 30 MIN: CPT | Performed by: FAMILY MEDICINE

## 2022-03-14 PROCEDURE — 87510 GARDNER VAG DNA DIR PROBE: CPT

## 2022-03-14 PROCEDURE — 87660 TRICHOMONAS VAGIN DIR PROBE: CPT

## 2022-03-14 PROCEDURE — 99213 OFFICE O/P EST LOW 20 MIN: CPT | Performed by: FAMILY MEDICINE

## 2022-03-14 RX ORDER — AMOXICILLIN AND CLAVULANATE POTASSIUM 875; 125 MG/1; MG/1
1 TABLET, FILM COATED ORAL 2 TIMES DAILY
Qty: 20 TABLET | Refills: 0 | Status: SHIPPED | OUTPATIENT
Start: 2022-03-14 | End: 2022-03-24

## 2022-03-14 ASSESSMENT — ENCOUNTER SYMPTOMS
ALLERGIC/IMMUNOLOGIC NEGATIVE: 1
RESPIRATORY NEGATIVE: 1
GASTROINTESTINAL NEGATIVE: 1
BACK PAIN: 1
EYES NEGATIVE: 1

## 2022-03-14 NOTE — PROGRESS NOTES
Subjective:      Patient ID: Yusra Jason is a 46 y.o. female. HPI  Acute urgent care visit for vaginitis concerns. Pain with intercourse last night. Noticing some odor, minimal discharge. Itching on each side of the labia. Concerns for a rash. S/p ablation, no menses. Suspects she is close to menopause if not in it. Some hot flashes at night endorsed. Past Medical History:   Diagnosis Date    Anxiety     DVT of popliteal vein ([facility] [address] 08/2018    right leg, following knee surgery    Reflux     Ventricular tachycardia, inducible ([address] 01/18/2019    syncope->abnormal ekg/ischemia?->normal cath. ->ep with sustained/non sustained/inducible monomorphic VT [facility]     Past Surgical History:   Procedure Laterality Date    CARDIAC DEFIBRILLATOR PLACEMENT [facility]  sustained VT, non sustained/inducible VT on EP study.  CHOLECYSTECTOMY  12/23/2010    COLONOSCOPY  06/04/2018    [facility], single polyp cecum, diverticuli , follow up 5 years.  COLONOSCOPY N/A 6/10/2020    *COLONOSCOPY performed by Felisha Maciel DO at [address] [place] [address]  ~2000    Dr. Modesta Wood   fibroid/bleeding   [place]  11/14/2016    KNEE SURGERY Right 07/02/2018    rt knee scope, scar debridement, MMD, patellofemoral ligament repair, microfracture of medial femoral condyle    LAPAROSCOPY  09/12/2012    Exploratory; lysis of adhesions.  UPPER GASTROINTESTINAL ENDOSCOPY  07/23/2012    Grade 2 esophagitis.  UPPER GASTROINTESTINAL ENDOSCOPY  12/17/2010    small hiatal hernia    UPPER GASTROINTESTINAL ENDOSCOPY N/A 6/10/2020    EGD BIOPSY performed by Felisha Maciel DO at [facility] OR     Current Outpatient Medications   Medication Sig Dispense Refill    ALPRAZolam (XANAX) 0.25 MG tablet Take 1 tablet by mouth daily as needed for Sleep or Anxiety for up to 30 days.  30 tablet 0    fluticasone (FLONASE) 50 MCG/ACT nasal spray 2 sprays by Nasal route daily 1 Bottle 3    esomeprazole (NEXIUM) 40 MG delayed release capsule Take 1 capsule by mouth daily 90 capsule 3    metoprolol succinate (TOPROL XL) 25 MG extended release tablet Take 25 mg by mouth daily      Alum Hydroxide-Mag Carbonate (GAVISCON PO) Take 2 tablets by mouth nightly. No current facility-administered medications for this visit. Allergies   Allergen Reactions    Doxycycline Calcium Nausea And Vomiting    Meloxicam Rash    Oxycodone-Acetaminophen Rash         Review of Systems   Constitutional: Negative. HENT: Negative. Eyes: Negative. Respiratory: Negative. Cardiovascular: Negative. Gastrointestinal: Negative. Endocrine: Negative. Genitourinary: Positive for dyspareunia and vaginal discharge. Negative for flank pain, hematuria and urgency. Musculoskeletal: Positive for back pain. Skin: Negative. Allergic/Immunologic: Negative. Neurological: Negative. Hematological: Negative. Psychiatric/Behavioral: Negative. Objective:   Physical Exam  Exam conducted with a chaperone present. Constitutional:       General: She is not in acute distress. Appearance: She is well-developed. HENT:      Head: Normocephalic and atraumatic. Right Ear: External ear normal.      Left Ear: External ear normal.      Mouth/Throat:      Pharynx: No oropharyngeal exudate. Eyes:      General: No scleral icterus. Conjunctiva/sclera: Conjunctivae normal.   Neck:      Thyroid: No thyromegaly. Cardiovascular:      Rate and Rhythm: Normal rate and regular rhythm. Heart sounds: Normal heart sounds. No murmur heard. Pulmonary:      Effort: Pulmonary effort is normal. No respiratory distress. Breath sounds: Normal breath sounds. No wheezing. Abdominal:      General: Bowel sounds are normal. There is no distension. Palpations: Abdomen is soft. Tenderness: There is no abdominal tenderness.  There is no rebound. Hernia: There is no hernia in the left inguinal area or right inguinal area. Genitourinary:     General: Normal vulva. Exam position: Lithotomy position. Labia:         Right: No rash. Left: No rash. Urethra: No prolapse. Vagina: Vaginal discharge (mild, white) present. Cervix: Cervical motion tenderness and discharge present. No friability, lesion, erythema or cervical bleeding. Uterus: Tender. Adnexa:         Right: No mass. Left: No mass. Musculoskeletal:         General: No tenderness. Normal range of motion. Cervical back: Neck supple. Skin:     General: Skin is warm and dry. Findings: No erythema or rash. Neurological:      Mental Status: She is alert and oriented to person, place, and time. Psychiatric:         Behavior: Behavior normal.         Thought Content: Thought content normal.         Judgment: Judgment normal.       /70 (Site: Right Upper Arm, Position: Sitting, Cuff Size: Large Adult)   Pulse 72   Ht 5' 7\" (1.702 m)   Wt 196 lb (88.9 kg)   BMI 30.70 kg/m²     Assessment:      Encounter Diagnoses   Name Primary?  Dyspareunia in female Yes    Vaginal discharge     History of uterine fibroid     Cervical motion tenderness            Plan:      Mild white discharge. No internal or external vaginal lesions  Fairly tender to even light manipulation of cervix on by-manual exam.   Covering for endometritis with augmentin  Plan ob /gyne follow up exam and advice. Known fibroid uterus issue s/p ablation. Plan updated ultrasound prior to consult.           Jessica Sharpe MD

## 2022-03-15 ENCOUNTER — HOSPITAL ENCOUNTER (OUTPATIENT)
Dept: ULTRASOUND IMAGING | Age: 52
Discharge: HOME | End: 2022-03-17
Payer: COMMERCIAL

## 2022-03-15 DIAGNOSIS — N94.9 CERVICAL MOTION TENDERNESS: ICD-10-CM

## 2022-03-15 DIAGNOSIS — Z86.018 HISTORY OF UTERINE FIBROID: ICD-10-CM

## 2022-03-15 DIAGNOSIS — N94.10 DYSPAREUNIA IN FEMALE: ICD-10-CM

## 2022-03-15 LAB
CANDIDA SPECIES, DNA PROBE: NEGATIVE
GARDNERELLA VAGINALIS, DNA PROBE: NEGATIVE
SOURCE: NORMAL
TRICHOMONAS VAGINALIS DNA: NEGATIVE

## 2022-03-15 PROCEDURE — 76830 TRANSVAGINAL US NON-OB: CPT

## 2022-03-17 ENCOUNTER — TELEPHONE (OUTPATIENT)
Dept: FAMILY MEDICINE CLINIC | Age: 52
End: 2022-03-17

## 2022-03-17 RX ORDER — FLUCONAZOLE 150 MG/1
150 TABLET ORAL ONCE
Qty: 1 TABLET | Refills: 0 | Status: SHIPPED | OUTPATIENT
Start: 2022-03-17 | End: 2022-03-17

## 2022-03-17 NOTE — TELEPHONE ENCOUNTER
Pt called stating she is still not feeling any better and wants to know if she needs to be checked out again. Pt states she is still having a lot of Pain in abdominal area nauseated.

## 2022-03-29 ENCOUNTER — OFFICE VISIT (OUTPATIENT)
Dept: PRIMARY CARE CLINIC | Age: 52
End: 2022-03-29
Payer: COMMERCIAL

## 2022-03-29 VITALS
WEIGHT: 197.2 LBS | RESPIRATION RATE: 16 BRPM | DIASTOLIC BLOOD PRESSURE: 84 MMHG | OXYGEN SATURATION: 95 % | HEART RATE: 59 BPM | TEMPERATURE: 98.4 F | BODY MASS INDEX: 30.89 KG/M2 | SYSTOLIC BLOOD PRESSURE: 124 MMHG

## 2022-03-29 DIAGNOSIS — J20.9 ACUTE BRONCHITIS, UNSPECIFIED ORGANISM: Primary | ICD-10-CM

## 2022-03-29 PROCEDURE — 99214 OFFICE O/P EST MOD 30 MIN: CPT | Performed by: FAMILY MEDICINE

## 2022-03-29 RX ORDER — CEFDINIR 300 MG/1
300 CAPSULE ORAL 2 TIMES DAILY
Qty: 20 CAPSULE | Refills: 0 | Status: SHIPPED | OUTPATIENT
Start: 2022-03-29 | End: 2022-04-08

## 2022-03-29 RX ORDER — PREDNISONE 20 MG/1
TABLET ORAL
Qty: 15 TABLET | Refills: 0 | Status: SHIPPED | OUTPATIENT
Start: 2022-03-29 | End: 2022-05-23

## 2022-03-29 ASSESSMENT — ENCOUNTER SYMPTOMS
COUGH: 1
SINUS PRESSURE: 1
VOMITING: 0
SINUS PAIN: 1
SHORTNESS OF BREATH: 0
ABDOMINAL PAIN: 0
SORE THROAT: 0
EYE REDNESS: 0
DIARRHEA: 0
WHEEZING: 1
EYE DISCHARGE: 0
RHINORRHEA: 1
TROUBLE SWALLOWING: 0
CONSTIPATION: 0
NAUSEA: 0

## 2022-03-29 NOTE — PROGRESS NOTES
mouth daily Yes Gurwinder Street MD   metoprolol succinate (TOPROL XL) 25 MG extended release tablet Take 25 mg by mouth daily Yes Historical Provider, MD   Alum Hydroxide-Mag Carbonate (GAVISCON PO) Take 2 tablets by mouth nightly. Yes Historical Provider, MD        Social History     Tobacco Use    Smoking status: Former Smoker     Packs/day: 1.00     Years: 25.00     Pack years: 25.00     Types: Cigarettes     Start date: 1/1/1985     Quit date: 1/18/2019     Years since quitting: 3.1    Smokeless tobacco: Never Used   Substance Use Topics    Alcohol use: Yes     Alcohol/week: 0.0 standard drinks     Comment: occasional        Vitals:    03/29/22 1540   BP: 124/84   Pulse: 59   Resp: 16   Temp: 98.4 °F (36.9 °C)   SpO2: 95%   Weight: 197 lb 3.2 oz (89.4 kg)     Estimated body mass index is 30.89 kg/m² as calculated from the following:    Height as of 3/14/22: 5' 7\" (1.702 m). Weight as of this encounter: 197 lb 3.2 oz (89.4 kg). Physical Exam  Vitals and nursing note reviewed. Constitutional:       General: She is not in acute distress. Appearance: Normal appearance. She is well-developed. She is not diaphoretic. HENT:      Head: Normocephalic and atraumatic. Right Ear: External ear normal.      Left Ear: External ear normal.      Ears:      Comments: TMs dull with fluid behind the TM     Nose: Congestion and rhinorrhea present. Mouth/Throat:      Pharynx: No posterior oropharyngeal erythema. Comments: Post nasal drainage noted  Eyes:      General: No scleral icterus. Right eye: No discharge. Left eye: No discharge. Conjunctiva/sclera: Conjunctivae normal.      Pupils: Pupils are equal, round, and reactive to light. Neck:      Thyroid: No thyromegaly. Cardiovascular:      Rate and Rhythm: Normal rate and regular rhythm. Heart sounds: Normal heart sounds. Pulmonary:      Effort: Pulmonary effort is normal. No respiratory distress.       Breath sounds: Normal breath sounds. No wheezing. Musculoskeletal:      Cervical back: Normal range of motion and neck supple. Lymphadenopathy:      Cervical: Cervical adenopathy present. Skin:     General: Skin is warm and dry. Findings: No rash. Neurological:      Mental Status: She is alert and oriented to person, place, and time. Psychiatric:         Behavior: Behavior normal.         Thought Content: Thought content normal.         Judgment: Judgment normal.         ASSESSMENT/PLAN:  Encounter Diagnosis   Name Primary?  Acute bronchitis, unspecified organism Yes     Orders Placed This Encounter   Medications    predniSONE (DELTASONE) 20 MG tablet     Si bid for 5 days, 1 qd for 5 days     Dispense:  15 tablet     Refill:  0    cefdinir (OMNICEF) 300 MG capsule     Sig: Take 1 capsule by mouth 2 times daily for 10 days     Dispense:  20 capsule     Refill:  0     Tylenol/Motrin prn    Increase fluids and rest    Can use mucinex or mucinex DM or comparable for congestion or cough. Return  if no improvement in symptoms or if any further symptoms arise. No follow-ups on file. An electronic signature was used to authenticate this note.     --Nohelia Whatley DO on 3/29/2022 at 3:47 PM

## 2022-05-04 ENCOUNTER — HOSPITAL ENCOUNTER (OUTPATIENT)
Age: 52
Setting detail: SPECIMEN
Discharge: HOME | End: 2022-05-04
Payer: COMMERCIAL

## 2022-05-04 ENCOUNTER — OFFICE VISIT (OUTPATIENT)
Dept: OBGYN | Age: 52
End: 2022-05-04
Payer: COMMERCIAL

## 2022-05-04 VITALS
SYSTOLIC BLOOD PRESSURE: 108 MMHG | HEIGHT: 67 IN | RESPIRATION RATE: 96 BRPM | HEART RATE: 66 BPM | BODY MASS INDEX: 30.86 KG/M2 | DIASTOLIC BLOOD PRESSURE: 68 MMHG | WEIGHT: 196.6 LBS

## 2022-05-04 DIAGNOSIS — N94.10 DYSPAREUNIA IN FEMALE: Primary | ICD-10-CM

## 2022-05-04 DIAGNOSIS — Z01.419 ENCOUNTER FOR WELL WOMAN EXAM WITH ROUTINE GYNECOLOGICAL EXAM: ICD-10-CM

## 2022-05-04 DIAGNOSIS — N30.10 INTERSTITIAL CYSTITIS: ICD-10-CM

## 2022-05-04 DIAGNOSIS — R10.819 SUPRAPUBIC TENDERNESS: ICD-10-CM

## 2022-05-04 LAB
-: ABNORMAL
BACTERIA: ABNORMAL
BILIRUBIN URINE: NEGATIVE
EPITHELIAL CELLS UA: ABNORMAL /HPF (ref 0–5)
GLUCOSE URINE: NEGATIVE
KETONES, URINE: NEGATIVE
LEUKOCYTE ESTERASE, URINE: NEGATIVE
NITRITE, URINE: NEGATIVE
PH UA: 5.5 (ref 5–6)
PROTEIN UA: NEGATIVE
RBC UA: ABNORMAL /HPF (ref 0–4)
SPECIFIC GRAVITY UA: 1.02 (ref 1.01–1.02)
URINE HGB: NEGATIVE
UROBILINOGEN, URINE: NORMAL
WBC UA: ABNORMAL /HPF (ref 0–4)

## 2022-05-04 PROCEDURE — 99213 OFFICE O/P EST LOW 20 MIN: CPT | Performed by: OBSTETRICS & GYNECOLOGY

## 2022-05-04 PROCEDURE — G0145 SCR C/V CYTO,THINLAYER,RESCR: HCPCS

## 2022-05-04 PROCEDURE — 87624 HPV HI-RISK TYP POOLED RSLT: CPT

## 2022-05-04 PROCEDURE — 81001 URINALYSIS AUTO W/SCOPE: CPT

## 2022-05-04 PROCEDURE — 81003 URINALYSIS AUTO W/O SCOPE: CPT | Performed by: OBSTETRICS & GYNECOLOGY

## 2022-05-04 RX ORDER — AZITHROMYCIN 250 MG/1
250 TABLET, FILM COATED ORAL DAILY
Qty: 14 TABLET | Refills: 0 | Status: SHIPPED | OUTPATIENT
Start: 2022-05-04 | End: 2022-05-18

## 2022-05-04 RX ORDER — HYDROXYZINE HYDROCHLORIDE 10 MG/1
10 TABLET, FILM COATED ORAL 2 TIMES DAILY
Qty: 60 TABLET | Refills: 0 | Status: SHIPPED | OUTPATIENT
Start: 2022-05-04 | End: 2022-06-03

## 2022-05-04 RX ORDER — PHENAZOPYRIDINE HYDROCHLORIDE 100 MG/1
100 TABLET, FILM COATED ORAL 3 TIMES DAILY PRN
Qty: 90 TABLET | Refills: 0 | Status: SHIPPED | OUTPATIENT
Start: 2022-05-04 | End: 2022-05-23

## 2022-05-04 NOTE — PROGRESS NOTES
Fred Trujillo  5/4/2022    YOB: 1970          The patient was seen today. She is here regarding referral for dyspareunia/ pain suprapubic. Pt states she has pain with urination after intercourse. Pt s/p endometrial ablation years ago . Her bowels are regular and she is voiding without difficulty. HPI:  Fred Trujillo is a 46 y.o. female No obstetric history on file. OB History   No obstetric history on file. Past Medical History:   Diagnosis Date    Anxiety     DVT of popliteal vein ([address] 08/2018    right leg, following knee surgery    Reflux     Ventricular tachycardia, inducible ([address] 01/18/2019    syncope->abnormal ekg/ischemia?->normal cath. ->ep with sustained/non sustained/inducible monomorphic VT [facility]       Past Surgical History:   Procedure Laterality Date    CARDIAC DEFIBRILLATOR PLACEMENT Left 01/2019    [facility].  sustained VT, non sustained/inducible VT on EP study.  CHOLECYSTECTOMY  12/23/2010    COLONOSCOPY  06/04/2018    [facility], single polyp cecum, diverticuli , follow up 5 years.  COLONOSCOPY N/A 6/10/2020    *COLONOSCOPY performed by Anselmo Cobian DO at [address] [facility]  ~2000    Dr. Rodriguez Panccandie   fibroid/bleeding   [facility]  11/14/2016    KNEE SURGERY Right 07/02/2018    rt knee scope, scar debridement, MMD, patellofemoral ligament repair, microfracture of medial femoral condyle    LAPAROSCOPY  09/12/2012    Exploratory; lysis of adhesions.  UPPER GASTROINTESTINAL ENDOSCOPY  07/23/2012    Grade 2 esophagitis.     UPPER GASTROINTESTINAL ENDOSCOPY  12/17/2010    small hiatal hernia    UPPER GASTROINTESTINAL ENDOSCOPY N/A 6/10/2020    EGD BIOPSY performed by Anselmo Cobian DO at [facility] OR       Family History   Problem Relation Age of Onset    Other Mother         respiratory illness    Heart Disease Father     High Blood Pressure Father  Stroke Father     Kidney Disease Father     Cancer Father     Heart Disease Other     High Blood Pressure Other     Other Child         respiratory illness       Social History     Socioeconomic History    Marital status:      Spouse name: Not on file    Number of children: Not on file    Years of education: Not on file    Highest education level: Not on file   Occupational History    Not on file   Tobacco Use    Smoking status: Former Smoker     Packs/day: 1.00     Years: 25.00     Pack years: 25.00     Types: Cigarettes     Start date: 1/1/1985     Quit date: 1/18/2019     Years since quitting: 3.2    Smokeless tobacco: Never Used   Substance and Sexual Activity    Alcohol use: Yes     Alcohol/week: 0.0 standard drinks     Comment: occasional    Drug use: No    Sexual activity: Not on file   Other Topics Concern    Not on file   Social History Narrative    Not on file     Social Determinants of Health     Financial Resource Strain: Low Risk     Difficulty of Paying Living Expenses: Not hard at all   Food Insecurity: No Food Insecurity    Worried About [address] in the Last Year: Never true    [address] in the Last Year: Never true   Transportation Needs:     Lack of Transportation (Medical): Not on file    Lack of Transportation (Non-Medical):  Not on file   Physical Activity:     Days of Exercise per Week: Not on file    Minutes of Exercise per Session: Not on file   Stress:     Feeling of Stress : Not on file   Social Connections:     Frequency of Communication with Friends and Family: Not on file    Frequency of Social Gatherings with Friends and Family: Not on file    Attends Roman [employer] Services: Not on file    Active Member of Clubs or Organizations: Not on file    Attends Club or Organization Meetings: Not on file    Marital Status: Not on file   Intimate Partner Violence:     Fear of Current or Ex-Partner: Not on file    Emotionally Abused: Not on file    Physically Abused: Not on file    Sexually Abused: Not on file   Housing Stability:     Unable to Pay for Housing in the Last Year: Not on file    Number of Places Lived in the Last Year: Not on file    Unstable Housing in the Last Year: Not on file         MEDICATIONS:  Current Outpatient Medications   Medication Sig Dispense Refill    predniSONE (DELTASONE) 20 MG tablet 1 bid for 5 days, 1 qd for 5 days 15 tablet 0    fluticasone (FLONASE) 50 MCG/ACT nasal spray 2 sprays by Nasal route daily 1 Bottle 3    esomeprazole (NEXIUM) 40 MG delayed release capsule Take 1 capsule by mouth daily 90 capsule 3    metoprolol succinate (TOPROL XL) 25 MG extended release tablet Take 25 mg by mouth daily      Alum Hydroxide-Mag Carbonate (GAVISCON PO) Take 2 tablets by mouth nightly. No current facility-administered medications for this visit. ALLERGIES:  Allergies as of 05/04/2022 - Fully Reviewed 03/29/2022   Allergen Reaction Noted    Doxycycline calcium Nausea And Vomiting 07/26/2013    Meloxicam Rash 09/25/2018    Oxycodone-acetaminophen Rash 09/25/2018         REVIEW OF SYSTEMS:       A minimum of an eleven point review of systems was completed. Review Of Systems (11 point):  Constitutional: No fever, chills or malaise; No weight change or fatigue  Head and Eyes: No vision, Headache, Dizziness or trauma in last 12 months  ENT ROS: No hearing, Tinnitis, sinus or taste problems  Hematological and Lymphatic ROS:No Lymphoma, Von Willebrand's, Hemophillia or Bleeding History  Psych ROS: No Depression, Homicidal thoughts,suicidal thoughts, or anxiety  Breast ROS: No prior breast abnormalities or lumps  Respiratory ROS: No SOB, Pneumoniae,Cough, or Pulmonary Embolism History  Cardiovascular ROS: No Chest Pain with Exertion, Palpitations, Syncope, Edema, Arrhythmia  Gastrointestinal ROS: No Indigestion, Heartburn, Nausea, vomiting, Diarrhea, Constipation,or Bowel Changes;  No Bloody Stools or melena  Genito-Urinary ROS: No Dysuria, Hematuria or Nocturia. No Urinary Incontinence or Vaginal Discharge  Musculoskeletal ROS: No Arthralgia, Arthritis,Gout,Osteoporosis or Rheumatism  Neurological ROS: No CVA, Migraines, Epilepsy, Seizure Hx, or Limb Weakness  Dermatological ROS: No Rash, Itching, Hives, Mole Changes or Cancer          Blood pressure 108/68, pulse 66, resp. rate (!) 96, height 5' 7\" (1.702 m), weight 196 lb 9.6 oz (89.2 kg), not currently breastfeeding. Chaperone for Intimate Exam   Chaperone was offered and accepted as part of the rooming process.  Chaperone: Gail Vasquez         Abdomen: Soft non-tender; good bowel sounds. No guarding, rebound or rigidity. No CVA tenderness bilaterally. Extremities: No calf tenderness, DTR 2/4, and No edema bilaterally    Pelvic: Vulva and vagina appear normal. Bimanual exam reveals normal uterus and adnexa. Diagnostics:  No results found. Lab Results:  Results for orders placed or performed during the hospital encounter of 03/14/22   Vaginitis DNA Probe    Specimen: Vaginal   Result Value Ref Range    Source . VAGINAL SWAB     Trichomonas Vaginalis DNA NEGATIVE NEGATIVE    GARDNERELLA VAGINALIS, DNA PROBE NEGATIVE NEGATIVE    CANDIDA SPECIES, DNA PROBE NEGATIVE NEGATIVE         Assessment:   Diagnosis Orders   1. Dyspareunia in female  Follicle Stimulating Hormone   2. Suprapubic tenderness     3. Interstitial cystitis       Patient Active Problem List    Diagnosis Date Noted    Ventricular tachycardia, inducible ([facility] 01/18/2019     syncope->abnormal ekg/ischemia?->normal cath. ->ep with sustained/non sustained/inducible monomorphic VT [facility]      DVT of popliteal vein ([facility] 08/01/2018     right leg, following knee surgery      Anxiety     Irritable bowel syndrome with both constipation and diarrhea          Interstitial Cystitis   (IC; Painful Bladder Syndrome) Pronounced: In-tur-STI-shul sis-[id number]     Definition   Interstitial cystitis is chronic inflammation of the wall of the bladder. Inflammation can lead to scarring, pinpoint bleeding of the bladder wall, and decreased space to hold urine. Although the symptoms are similar to those of a bladder infection , there is usually no clear cause. The Bladder       [address] [place] [address].   Causes   Because bacteria, fungi, or viruses are rarely found in the urine of people with interstitial cystitis, the cause is unclear. Possible causes include: An autoimmune response that occurs following a bacterial infection of the bladder   Bacteria that cling too tightly to the wall of the bladder   A leaky inner lining of the bladder that allows irritating substances in the urine to come into contact with the bladder wall   Risk Factors   A risk factor is something that increases your chance of getting a disease or condition. Risk factors for interstitial cystitis include:   Sex: seen in women nine times more often than in men   Race: 90% of cases occur in Caucasians   Genetics: higher rate in first degree relatives   Stress   Associated pain disorders: fibromyalgia or chronic fatigue syndrome   History of childhood bladder problems   Irritable bowel syndrome   Symptoms   The symptoms of interstitial cystitis vary from person to person. They can also occur in cycles. Some people experience periods of intense symptoms followed by periods of remission. Pain can be severe enough to keep people from working or even walking.    Symptoms can include:   Discomfort, pain, or pressure in the bladder or pelvic area when the bladder is full and relief when the bladder is emptied   Urgent need to urinate   Frequent need to urinate (up to 60 times per day in severe cases)   Pain during and after intercourse or during orgasms   Blood and pus in the urine   Depression   Pain in the vulva or vagina in women, or in the testes, groin, or tip of penis   Constipation   Nocturia (urination at night, especially when excessive) from once to over 12 times every night   Diagnosis   Your doctor will ask about your symptoms and medical history. A physical exam will be done. In addition, your urine will be tested for pus and bacteria. If bacteria are present in the urine, you will likely be diagnosed with acute cystitisa typical bladder infection. If no bacteria are present, your doctor will likely do other tests. A diagnosis of interstitial cystitis will only be made after other conditions have been ruled out. Cystoscopy with bladder distention may be done. This consists of distending (stretching) the bladder to its full capacity by instilling liquids through the cystoscope. If interstitial cystitis is present, there may be characteristic changes in the wall of the bladder following this distension (usually called glomerulations, or occasionally Hunner's ulcers). These findings are usually interpreted as confirming a diagnosis of interstitial cystitis. Random biopsy of the bladder might be performed if any abnormality is seen. Treatment   There is no known treatment to cure interstitial cystitis. Treatment is aimed at relieving symptoms. Treatment depends on your symptoms. You may have to try several different treatments before you experience relief. Treatments include:   Bladder Distention   Some people experience relief after a bladder distention (during the cystoscopy) is done. Bladder Instillation   During bladder instillation, a \"wash\" is put into the bladder through a tube in the urethra. It is held for anywhere from a few seconds to 15 minutes and then voided. There are several different types of solutions used. Some coat the bladder and are thought to decrease the inflammation. An example of this is called Hanno \"cocktail,\" which is comprised of heparin and sodium bicarbonate.    Medication   Medications may include:   Bladder coatingtaken orally to coat and protect the bladder   Antidepressants and pain relieversfor pain relief   Antihistaminesmay help stop the cycle of inflammation   Antispasmodicsmay alleviate frequency and urgency of urination   Diet   There is no research linking diet to interstitial cystitis. But many people find that changes in diet can help relieve pain. Different people have different \"trigger\" foods. Foods commonly reported to aggravate interstitial cystitis include:   Coffee   Chocolate   Artificial sweeteners   Alcohol   Acidic foods   Carbonated beverages   Transcutaneous Electrical Nerve Stimulation (TENS)   TENS uses an external device that sends mild electrical impulses into the body. It has been helpful in relieving pain and decreasing frequency of urination in some people. InterStim Therapy   InterStim therapy uses an approved device that has been reported to possibly provide symptomatic relief in some patients with interstitial cystitis refractory to more conventional treatments. This is an electronic device that is implanted into the sacral nerve roots of the spinal cord. Electrical impulses are sent to these roots in regular intervals. This is meant to modulate the neural output of the pelvic nerves supplying the bladder. While some patients have reported some relief, they appear to be in the minority. Doctors do not know yet what makes the device helpful. Bladder Training   Some people are able to train their bladder to have better control by setting a regular timed schedule for emptying their bladder. The amount of time between voids is gradually increased. Bladder training should be attempted only after pain relief has been accomplished. Surgery   Surgery is a treatment of last resort. It is used after all other treatment methods have been exhausted and if the pain remains severe.  The usual approaches include either increasing the capacity of the bladder by adding a segment of bowel to the distensible portion of the bladder (ie, bladder augmentation) or by removing the entire bladder (ie, cystectomy). These surgeries are rarely done for this condition. Many people continue to have pain even after surgery. Prevention   There are no guidelines for preventing interstitial cystitis because the cause is unknown. However, recurrence or aggravation of interstitial cystitis could be reduced by avoiding the following foods or drinks:   Caffeine-containing beverages   High-acid citrus fruits   Spicy foods   Vinegar   Chocolate   Fermented foods   Alcohol         PLAN:  Return in about 2 weeks (around 5/18/2022) for vv follow up. Rx zithromax/ pyridium/ atarax. If symptoms improve referral to urology  Repeat Annual every 1 year  Cervical Cytology Evaluation begins at 24years old. If Negative Cytology, Follow-up screening per current guidelines. Return to the office in 2-3 weeks. Counseled on preventative health maintenance follow-up. Orders Placed This Encounter   Procedures    Follicle Stimulating Hormone     Standing Status:   Future     Standing Expiration Date:   5/4/2023           The patient, Laisha Garzon is a 46 y.o. female, was seen with a total time spent of 20 minutes for the visit on this date of service by the E/M provider. The time component had both face to face and non face to face time spent in determining the total time component. Counseling and education regarding her diagnosis listed below and her options regarding those diagnoses were also included in determining her time component. Diagnosis Orders   1. Dyspareunia in female  Follicle Stimulating Hormone   2. Suprapubic tenderness     3. Interstitial cystitis          The patient had her preventative health maintenance recommendations and follow-up reviewed with her at the completion of her visit.

## 2022-05-04 NOTE — PATIENT INSTRUCTIONS
Patient Education        Interstitial Cystitis: Care Instructions  Your Care Instructions     Interstitial cystitis is a long-term irritation of the bladder. It can cause mild to severe pain that comes and goes. You also may feel a sudden urge to urinate or need to urinate often. Sometimes the walls of the bladder becomescarred or get stiff. Doctors do not know what causes interstitial cystitis. But they do know that it is not caused by an infection. The problem is much more common in women than in men. Your doctor may do tests to make sure that you do not have an infection,kidney stones, or bladder cancer. Because the cause of interstitial cystitis is not known, your doctor may try several treatments. It may take several weeks or months to find a treatment that works. If diet and lifestyle changes do not help, you may need medicine. Your doctor may also put liquid or medicine into your bladder for a short timeto treat the pain. Follow-up care is a key part of your treatment and safety. Be sure to make and go to all appointments, and call your doctor if you are having problems. It's also a good idea to know your test results and keep alist of the medicines you take. How can you care for yourself at home?  Take your medicines exactly as prescribed. Call your doctor if you think you are having a problem with your medicine.  If your doctor prescribed antibiotics, take them as directed. Do not stop taking them just because you feel better. You need to take the full course of antibiotics.  Avoid eating spicy foods or high-acid foods, such as tomatoes and oranges, if these foods seem to make your pain worse. Also, limit caffeine and alcohol.  If a certain food seems to cause pain in your bladder, stop eating it to see if the pain goes away.  Do not smoke. Smoking can irritate the bladder and cause bladder cancer. If you need help quitting, talk to your doctor about stop-smoking programs and medicines.  These can increase your chances of quitting for good.  Try bladder training. Set certain times to go to the bathroom and slowly increase the time between visits. This may help lengthen the time your bladder can hold urine.  You might try a treatment called TENS. It sends a very mild electric current through wires placed near the pubic area. This is done for at least several minutes 2 times each day.  Consider a support group. Sharing your experiences with other people who have the same problem may help you learn more and cope better.  Wash your pubic area with a mild soap. Avoid deodorant soaps or soaps with heavy perfumes.  Wear loose-fitting clothing that does not put pressure on your bladder. When should you call for help? Call your doctor now or seek immediate medical care if:     You have symptoms of a urinary infection. For example:  ? You have blood or pus in your urine. ? You have pain in your back just below your rib cage. This is called flank pain. ? You have a fever, chills, or body aches. ? It hurts to urinate. ? You have groin or belly pain. Watch closely for changes in your health, and be sure to contact your doctor if:     You do not get better as expected. Where can you learn more? Go to https://My [employer]emids. org and sign in to your [employer] account. Enter L045 in the [facility] box to learn more about \"Interstitial Cystitis: Care Instructions. \"     If you do not have an account, please click on the \"Sign Up Now\" link. Current as of: October 18, 2021               Content Version: 13.2  © 2006-2022 [employer]. Care instructions adapted under license by [facility] ([facility]). If you have questions about a medical condition or this instruction, always ask your healthcare professional. [place] [address] any warranty or liability for your use of this information.

## 2022-05-10 ENCOUNTER — PATIENT MESSAGE (OUTPATIENT)
Dept: FAMILY MEDICINE CLINIC | Age: 52
End: 2022-05-10

## 2022-05-10 DIAGNOSIS — Z01.419 ENCOUNTER FOR WELL WOMAN EXAM WITH ROUTINE GYNECOLOGICAL EXAM: Primary | ICD-10-CM

## 2022-05-10 LAB — CYTOLOGY REPORT: NORMAL

## 2022-05-10 NOTE — TELEPHONE ENCOUNTER
Spoke with patient-urinalysis not indicating a UTI. [facility]

## 2022-05-10 NOTE — TELEPHONE ENCOUNTER
From: Fifi Cloud  To: Dr. Huong Thapa: 5/10/2022 2:03 PM EDT  Subject: Appointment    Miriam-I called in this morning to get in to see Dr. Fidencio Burdick. I was put on antibiotic last week for bladder infection. Its not working (lower back & abdomen not in pain now) and didn't know if I needed to redo the urine test before I got another antibiotic. Can you let me know? I don't know that I can go through another night of this. Darren me at [phone] Thanks!

## 2022-05-12 LAB
HPV SAMPLE: NORMAL
HPV, GENOTYPE 16: NOT DETECTED
HPV, GENOTYPE 18: NOT DETECTED
HPV, HIGH RISK OTHER: NOT DETECTED
HPV, INTERPRETATION: NORMAL
SPECIMEN DESCRIPTION: NORMAL

## 2022-05-23 ENCOUNTER — HOSPITAL ENCOUNTER (EMERGENCY)
Age: 52
Discharge: HOME | End: 2022-05-23
Attending: EMERGENCY MEDICINE
Payer: COMMERCIAL

## 2022-05-23 ENCOUNTER — APPOINTMENT (OUTPATIENT)
Dept: GENERAL RADIOLOGY | Age: 52
End: 2022-05-23
Payer: COMMERCIAL

## 2022-05-23 VITALS
SYSTOLIC BLOOD PRESSURE: 126 MMHG | OXYGEN SATURATION: 98 % | BODY MASS INDEX: 30.61 KG/M2 | HEIGHT: 67 IN | RESPIRATION RATE: 16 BRPM | TEMPERATURE: 98.1 F | WEIGHT: 195 LBS | DIASTOLIC BLOOD PRESSURE: 73 MMHG | HEART RATE: 65 BPM

## 2022-05-23 DIAGNOSIS — S93.401A SPRAIN OF RIGHT ANKLE, UNSPECIFIED LIGAMENT, INITIAL ENCOUNTER: Primary | ICD-10-CM

## 2022-05-23 PROCEDURE — 6370000000 HC RX 637 (ALT 250 FOR IP): Performed by: EMERGENCY MEDICINE

## 2022-05-23 PROCEDURE — 29515 APPLICATION SHORT LEG SPLINT: CPT

## 2022-05-23 PROCEDURE — 73610 X-RAY EXAM OF ANKLE: CPT

## 2022-05-23 PROCEDURE — 6370000000 HC RX 637 (ALT 250 FOR IP)

## 2022-05-23 PROCEDURE — 99283 EMERGENCY DEPT VISIT LOW MDM: CPT

## 2022-05-23 RX ORDER — ONDANSETRON 4 MG/1
4 TABLET, ORALLY DISINTEGRATING ORAL ONCE
Status: COMPLETED | OUTPATIENT
Start: 2022-05-23 | End: 2022-05-23

## 2022-05-23 RX ORDER — HYDROCODONE BITARTRATE AND ACETAMINOPHEN 5; 325 MG/1; MG/1
1 TABLET ORAL EVERY 6 HOURS PRN
Qty: 12 TABLET | Refills: 0 | Status: SHIPPED | OUTPATIENT
Start: 2022-05-23 | End: 2022-05-26

## 2022-05-23 RX ORDER — HYDROCODONE BITARTRATE AND ACETAMINOPHEN 5; 325 MG/1; MG/1
2 TABLET ORAL ONCE
Status: COMPLETED | OUTPATIENT
Start: 2022-05-23 | End: 2022-05-23

## 2022-05-23 RX ORDER — IBUPROFEN 800 MG/1
800 TABLET ORAL EVERY 8 HOURS PRN
Qty: 30 TABLET | Refills: 0 | Status: SHIPPED | OUTPATIENT
Start: 2022-05-23 | End: 2022-07-09

## 2022-05-23 RX ORDER — ONDANSETRON 4 MG/1
TABLET, ORALLY DISINTEGRATING ORAL
Status: COMPLETED
Start: 2022-05-23 | End: 2022-05-23

## 2022-05-23 RX ADMIN — HYDROCODONE BITARTRATE AND ACETAMINOPHEN 2 TABLET: 5; 325 TABLET ORAL at 07:12

## 2022-05-23 RX ADMIN — ONDANSETRON 4 MG: 4 TABLET, ORALLY DISINTEGRATING ORAL at 07:56

## 2022-05-23 ASSESSMENT — PAIN DESCRIPTION - LOCATION: LOCATION: LEG

## 2022-05-23 ASSESSMENT — LIFESTYLE VARIABLES: HOW OFTEN DO YOU HAVE A DRINK CONTAINING ALCOHOL: NEVER

## 2022-05-23 ASSESSMENT — PAIN - FUNCTIONAL ASSESSMENT: PAIN_FUNCTIONAL_ASSESSMENT: 0-10

## 2022-05-23 ASSESSMENT — PAIN DESCRIPTION - ORIENTATION: ORIENTATION: RIGHT

## 2022-05-23 ASSESSMENT — PAIN DESCRIPTION - DESCRIPTORS: DESCRIPTORS: SHARP;THROBBING

## 2022-05-23 ASSESSMENT — PAIN SCALES - GENERAL: PAINLEVEL_OUTOF10: 10

## 2022-05-23 NOTE — ED PROVIDER NOTES
[facility] ED  EMERGENCY DEPARTMENT ENCOUNTER      Pt Name: Prisca Egan  MRN: [id number]  [place] 1970  Date of evaluation: 5/23/2022  Provider: Nicola Azar MD    [address] [place] [address]       Chief Complaint   Patient presents with    Leg Pain       HISTORY OF PRESENT ILLNESS  (Location/Symptom, Timing/Onset, Context/Setting, Quality, Duration, Modifying Factors, Severity.)   Prisca Egan is a 46 y.o. female who presents to the emergency department complaining of right ankle pain after getting into the shower today and slipping with a fall. She denies any headache or neck pain. She did not hit her head or neck she tells me. No problems anywhere else but she does complain of some tingling in the toes. Nursing Notes were reviewed. REVIEW OF SYSTEMS    (2-9 systems for level 4, 10 or more for level 5)     Review of Systems   Musculoskeletal:        Right ankle pain after injury   All other systems reviewed and are negative. Except as noted above the remainder of the review of systems was reviewed and negative. PAST MEDICAL HISTORY     Past Medical History:   Diagnosis Date    Anxiety     DVT of popliteal vein ([address] 08/2018    right leg, following knee surgery    Reflux     Ventricular tachycardia, inducible ([address] 01/18/2019    syncope->abnormal ekg/ischemia?->normal cath. ->ep with sustained/non sustained/inducible monomorphic VT [facility]       SURGICAL HISTORY       Past Surgical History:   Procedure Laterality Date    CARDIAC DEFIBRILLATOR PLACEMENT Left 01/2019    [facility].  sustained VT, non sustained/inducible VT on EP study.  CHOLECYSTECTOMY  12/23/2010    COLONOSCOPY  06/04/2018    [facility], single polyp cecum, diverticuli , follow up 5 years.      COLONOSCOPY N/A 6/10/2020    *[facility] [address]  ~[facility]    Dr. Nicole Cazares fibroid/bleeding    KNEE SURGERY  11/14/2016    KNEE SURGERY Right 07/02/2018    rt knee scope, scar debridement, MMD, patellofemoral ligament repair, microfracture of medial femoral condyle    LAPAROSCOPY  09/12/2012    Exploratory; lysis of adhesions.  UPPER GASTROINTESTINAL ENDOSCOPY  07/23/2012    Grade 2 esophagitis.  UPPER GASTROINTESTINAL ENDOSCOPY  12/17/2010    small hiatal hernia    UPPER GASTROINTESTINAL ENDOSCOPY N/A 6/10/2020    EGD BIOPSY performed by Beth Fernandez DO at [address] [place], [place] [address]wy       Previous Medications    ALUM HYDROXIDE-MAG CARBONATE (GAVISCON PO)    Take 2 tablets by mouth nightly. ESOMEPRAZOLE (NEXIUM) 40 MG DELAYED RELEASE CAPSULE    Take 1 capsule by mouth daily    FLUTICASONE (FLONASE) 50 MCG/ACT NASAL SPRAY    2 sprays by Nasal route daily    HYDROXYZINE (ATARAX) 10 MG TABLET    Take 1 tablet by mouth 2 times daily    METOPROLOL SUCCINATE (TOPROL XL) 25 MG EXTENDED RELEASE TABLET    Take 25 mg by mouth daily       ALLERGIES     Doxycycline calcium, Meloxicam, and Oxycodone-acetaminophen    FAMILY HISTORY           Problem Relation Age of Onset    Other Mother         respiratory illness    Heart Disease Father     High Blood Pressure Father     Stroke Father     Kidney Disease Father     Cancer Father     Heart Disease Other     High Blood Pressure Other     Other Child         respiratory illness     Family Status   Relation Name Status    Mother  (Not Specified)    Father  (Not Specified)    Other pat gf/gm (Not Specified)    Child  (Not Specified)        SOCIAL HISTORY      reports that she quit smoking about 3 years ago. Her smoking use included cigarettes. She started smoking about 37 years ago. She has a 25.00 pack-year smoking history. She has never used smokeless tobacco. She reports current alcohol use. She reports that she does not use drugs.     PHYSICAL EXAM    (up to 7 for level 4, 8 or more for level 5)     ED Triage Vitals [05/23/22 0657]   BP Temp Temp Source Pulse Resp SpO2 Height Weight   126/73 98.1 °F (36.7 °C) Tympanic 65 16 98 % 5' 7\" (1.702 m) 195 lb (88.5 kg)     Physical Exam  Vitals and nursing note reviewed. Constitutional:       General: She is in acute distress. Appearance: She is well-developed. She is not ill-appearing, toxic-appearing or diaphoretic. HENT:      Head: Normocephalic and atraumatic. Right Ear: External ear normal.      Left Ear: External ear normal.      Nose: Nose normal.      Mouth/Throat:      Mouth: Mucous membranes are moist.   Eyes:      General:         Right eye: No discharge. Left eye: No discharge. Conjunctiva/sclera: Conjunctivae normal.      Pupils: Pupils are equal, round, and reactive to light. Cardiovascular:      Rate and Rhythm: Normal rate and regular rhythm. Heart sounds: Normal heart sounds. No murmur heard. Pulmonary:      Effort: Pulmonary effort is normal. No respiratory distress. Breath sounds: Normal breath sounds. No wheezing, rhonchi or rales. Chest:      Chest wall: No tenderness. Abdominal:      General: Bowel sounds are normal. There is no distension. Palpations: Abdomen is soft. There is no mass. Tenderness: There is no abdominal tenderness. There is no guarding or rebound. Musculoskeletal:         General: Swelling, tenderness and signs of injury present. No deformity. Normal range of motion. Cervical back: Normal range of motion and neck supple. Right lower leg: No edema. Left lower leg: No edema. Skin:     General: Skin is warm. Findings: No rash. Neurological:      General: No focal deficit present. Mental Status: She is alert and oriented to person, place, and time. Sensory: No sensory deficit. Motor: No abnormal muscle tone.    Psychiatric:         Mood and Affect: Mood normal.         Behavior: Behavior normal.         DIAGNOSTIC RESULTS     EKG: All EKG's are interpreted by the Emergency Department Physician who either signs or Co-signs this chart in the absence of a cardiologist.    none    RADIOLOGY:   Non-plain film images such as CT, Ultrasound and MRI are read by the radiologist.   Interpretation per the Radiologist below, if available at the time of this note:    XR ANKLE RIGHT (MIN 3 VIEWS)   Final Result   No acute fracture or dislocation. ED BEDSIDE ULTRASOUND:   Performed by ED Physician - none    LABS:  Labs Reviewed - No data to display    All other labs were within normal range or not returned as of this dictation. EMERGENCY DEPARTMENT COURSE and DIFFERENTIAL DIAGNOSIS/MDM:   Vitals:    Vitals:    05/23/22 0657   BP: 126/73   Pulse: 65   Resp: 16   Temp: 98.1 °F (36.7 °C)   TempSrc: Tympanic   SpO2: 98%   Weight: 195 lb (88.5 kg)   Height: 5' 7\" (1.702 m)     We did discuss an x-ray and something for pain. She is agreeable. Have answered any questions she has at this time. Of course I am concerned about fracture versus sprain. We did go over results. We talked about follow-up to family physician for any further concerns. CONSULTS:  None    PROCEDURES:  Splint Application    Date/Time: 5/23/2022 7:35 AM  Performed by: Katie Chinchilla MD  Authorized by: Katie Chinchilla MD     Consent:     Consent obtained:  Verbal    Consent given by:  Patient    Risks discussed:  Numbness, pain and swelling  Pre-procedure details:     Sensation:  Normal  Procedure details:     Laterality:  Right    Location:  Ankle    Ankle:  R ankle    Splint type: Ankle stirrup    Supplies:  Prefabricated splint  Post-procedure details:     Pain:  Improved    Sensation:  Normal    Patient tolerance of procedure: Tolerated well, no immediate complications        FINAL IMPRESSION      1.  Sprain of right ankle, unspecified ligament, initial encounter          DISPOSITION/PLAN   DISPOSITION  home      PATIENT REFERRED TO:  Guero Caballero MD  [address] [facility] [address]  [facility] [address]  [phone]    Call in 2 days  As needed, For wound re-check      DISCHARGE MEDICATIONS:  New Prescriptions    IBUPROFEN (ADVIL;MOTRIN) 800 MG TABLET    Take 1 tablet by mouth every 8 hours as needed for Pain       (Please note that portions of this note were completed with a voice recognition program.  Efforts were made to edit the dictations but occasionally words are mis-transcribed.)    Andrews Ma MD  Attending Emergency Physician        Andrews Ma MD  05/23/22 5691

## 2022-05-25 ENCOUNTER — TELEPHONE (OUTPATIENT)
Dept: FAMILY MEDICINE CLINIC | Age: 52
End: 2022-05-25

## 2022-05-25 NOTE — TELEPHONE ENCOUNTER
----- Message from Paxton Hunter sent at 5/25/2022 10:55 AM EDT -----  Subject: Appointment Request    Reason for Call: Urgent (Patient Request) Hospital Follow Up    QUESTIONS  Type of Appointment? Established Patient  Reason for appointment request? Available appointments did not meet   patient need  Additional Information for Provider? Claudene Cashing called on 05/25 @   10:51 am. She was recently discharged from the [facility] on 05/23 because she suspected she broke her right leg. They ran   an x-ray and said that they did not find any fractures, however, to still   schedule a Hospital Follow up appointment to see Dr. Hesham López in the office   with a disposition of two days. Her right leg is still painful and   swollen. Please call back with availability.  ---------------------------------------------------------------------------  --------------  CALL BACK INFO  What is the best way for the office to contact you? OK to leave message on   [employer], OK to respond with electronic message via [employer] portal (only   for patients who have registered [employer] account)  Preferred Call Back Phone Number? [phone]  ---------------------------------------------------------------------------  --------------  SCRIPT ANSWERS  Relationship to Patient? Self  (Patient requests to see provider urgently. )? Yes  (Has the patient been discharged from the hospital within 2 business days   AND does not have a Telephone Encounter  Follow Up From [address] [place] [address]   documented in [address]?)? No  Do you have any questions for your primary care provider that need to be   answered prior to your appointment? (Use RN Triage if question pertains to   anything on the red flag list)? No  (Patient needs follow up visit after hospital discharge) Book first   available appointment within 7 days OF DISCHARGE, if no appt, proceed to   book the next available time slot within 14 days OF DISCHARGE AND Send   Message to Provider.  A Hospital Follow Up appointment cannot be booked   beyond 14 Days and should result in a Message to Provider. ? No  Have you been diagnosed with, awaiting test results for, or told that you   are suspected of having COVID-19 (Coronavirus)? (If patient has tested   negative or was tested as a requirement for work, school, or travel and   not based on symptoms, answer no)? No  Within the past 10 days have you developed any of the following symptoms   (answer no if symptoms have been present longer than 10 days or began   more than 10 days ago)? Fever or Chills, Cough, Shortness of breath or   difficulty breathing, Loss of taste or smell, Sore throat, Nasal   congestion, Sneezing or runny nose, Fatigue or generalized body aches   (answer no if pain is specific to a body part e.g. back pain), Diarrhea,   Headache? No  Have you had close contact with someone with COVID-19 in the last 7 days? No  (Service Expert  click yes below to proceed with [employer] As Usual   Scheduling)?  Yes

## 2022-05-25 NOTE — TELEPHONE ENCOUNTER
Spoke to patient and she requests follow up for fall/left foot pain. She was in ER 5/23/22. Patient offered appointment today but she is unable to come today. She is come into Urgent Care tomorrow to see Dr. Michelle Ortiz

## 2022-05-26 ENCOUNTER — OFFICE VISIT (OUTPATIENT)
Dept: PRIMARY CARE CLINIC | Age: 52
End: 2022-05-26
Payer: COMMERCIAL

## 2022-05-26 VITALS
RESPIRATION RATE: 18 BRPM | SYSTOLIC BLOOD PRESSURE: 100 MMHG | DIASTOLIC BLOOD PRESSURE: 62 MMHG | WEIGHT: 195 LBS | HEART RATE: 76 BPM | OXYGEN SATURATION: 96 % | BODY MASS INDEX: 30.61 KG/M2 | HEIGHT: 67 IN | TEMPERATURE: 98.1 F

## 2022-05-26 DIAGNOSIS — S93.491A SPRAIN OF OTHER LIGAMENT OF RIGHT ANKLE, INITIAL ENCOUNTER: Primary | ICD-10-CM

## 2022-05-26 PROCEDURE — 99213 OFFICE O/P EST LOW 20 MIN: CPT | Performed by: FAMILY MEDICINE

## 2022-05-26 RX ORDER — ALPRAZOLAM 0.25 MG/1
0.25 TABLET ORAL NIGHTLY PRN
COMMUNITY
End: 2022-05-31 | Stop reason: SDUPTHER

## 2022-05-26 ASSESSMENT — PATIENT HEALTH QUESTIONNAIRE - PHQ9
SUM OF ALL RESPONSES TO PHQ QUESTIONS 1-9: 0
1. LITTLE INTEREST OR PLEASURE IN DOING THINGS: 0
2. FEELING DOWN, DEPRESSED OR HOPELESS: 0
SUM OF ALL RESPONSES TO PHQ9 QUESTIONS 1 & 2: 0
SUM OF ALL RESPONSES TO PHQ QUESTIONS 1-9: 0

## 2022-05-26 ASSESSMENT — ENCOUNTER SYMPTOMS
EYES NEGATIVE: 1
RESPIRATORY NEGATIVE: 1
ALLERGIC/IMMUNOLOGIC NEGATIVE: 1
GASTROINTESTINAL NEGATIVE: 1

## 2022-05-26 NOTE — PROGRESS NOTES
Subjective:      Patient ID: Daryle Kansky is a 46 y.o. female. HPI  Follow up on Monday morning accident at home in the shower. Getting in the shower and slipped. Collapsed on the threshold, with her wt. Coming down on the right ankle. ER visit that day with negative xray. Rec. For follow up, still some concerns for occult fracture per patient recall. Significant swelling and bruising . Has aircast in place and using crutches today. Not able to put full wt. On the ankle. Past Medical History:   Diagnosis Date    Anxiety     DVT of popliteal vein ([address] 08/2018    right leg, following knee surgery    Reflux     Ventricular tachycardia, inducible ([address] 01/18/2019    syncope->abnormal ekg/ischemia?->normal cath. ->ep with sustained/non sustained/inducible monomorphic VT [facility]     Past Surgical History:   Procedure Laterality Date    CARDIAC DEFIBRILLATOR PLACEMENT Left 01/2019    [facility].  sustained VT, non sustained/inducible VT on EP study.  CHOLECYSTECTOMY  12/23/2010    COLONOSCOPY  06/04/2018    [facility], single polyp cecum, diverticuli , follow up 5 years.  COLONOSCOPY N/A 6/10/2020    *COLONOSCOPY performed by [facility] at [address]  ~[address]    Dr. Fabienne Melchor   fibroid/bleeding   [facility]  11/14/2016    KNEE SURGERY Right 07/02/2018    rt knee scope, scar debridement, MMD, patellofemoral ligament repair, microfracture of medial femoral condyle    LAPAROSCOPY  09/12/2012    Exploratory; lysis of adhesions.  UPPER GASTROINTESTINAL ENDOSCOPY  07/23/2012    Grade 2 esophagitis.     UPPER GASTROINTESTINAL ENDOSCOPY  12/17/2010    small hiatal hernia    UPPER GASTROINTESTINAL ENDOSCOPY N/A 6/10/2020    EGD BIOPSY performed by [employer] emma at [address] [place] [address] OR     Current Outpatient Medications   Medication Sig Dispense Refill    ALPRAZolam (XANAX) 0.25 MG tablet Take 0.25 mg by mouth nightly as needed for Sleep.  ibuprofen (ADVIL;MOTRIN) 800 MG tablet Take 1 tablet by mouth every 8 hours as needed for Pain 30 tablet 0    HYDROcodone-acetaminophen (NORCO) 5-325 MG per tablet Take 1 tablet by mouth every 6 hours as needed for Pain for up to 3 days. Intended supply: 3 days. Take lowest dose possible to manage pain 12 tablet 0    hydrOXYzine (ATARAX) 10 MG tablet Take 1 tablet by mouth 2 times daily 60 tablet 0    fluticasone (FLONASE) 50 MCG/ACT nasal spray 2 sprays by Nasal route daily 1 Bottle 3    esomeprazole (NEXIUM) 40 MG delayed release capsule Take 1 capsule by mouth daily 90 capsule 3    metoprolol succinate (TOPROL XL) 25 MG extended release tablet Take 25 mg by mouth daily      Alum Hydroxide-Mag Carbonate (GAVISCON PO) Take 2 tablets by mouth nightly. No current facility-administered medications for this visit. Allergies   Allergen Reactions    Doxycycline Calcium Nausea And Vomiting    Meloxicam Rash    Oxycodone-Acetaminophen Rash       Review of Systems   Constitutional: Negative. HENT: Negative. Eyes: Negative. Respiratory: Negative. Cardiovascular: Negative. Gastrointestinal: Negative. Endocrine: Negative. Genitourinary: Negative. Musculoskeletal: Positive for arthralgias (right ankle), gait problem and joint swelling. Skin: Negative. Allergic/Immunologic: Negative. Hematological: Negative. Psychiatric/Behavioral: Negative. Objective:   Physical Exam  Constitutional:       General: She is not in acute distress. Appearance: She is well-developed. HENT:      Head: Normocephalic and atraumatic. Right Ear: External ear normal.      Left Ear: External ear normal.      Mouth/Throat:      Pharynx: No oropharyngeal exudate. Eyes:      General: No scleral icterus. Conjunctiva/sclera: Conjunctivae normal.   Neck:      Thyroid: No thyromegaly.    Cardiovascular:      Rate and Rhythm: Normal rate and regular rhythm. Heart sounds: Normal heart sounds. No murmur heard. Pulmonary:      Effort: Pulmonary effort is normal. No respiratory distress. Breath sounds: Normal breath sounds. No wheezing. Abdominal:      General: Bowel sounds are normal. There is no distension. Palpations: Abdomen is soft. Tenderness: There is no abdominal tenderness. There is no rebound. Musculoskeletal:      Cervical back: Neck supple. Right ankle: Swelling and ecchymosis (above ankle , anterior) present. Tenderness (anterior ankle mortise , interosseus) present. Decreased range of motion. Skin:     General: Skin is warm and dry. Findings: No erythema or rash. Neurological:      Mental Status: She is alert and oriented to person, place, and time. Psychiatric:         Behavior: Behavior normal.         Thought Content: Thought content normal.         Judgment: Judgment normal.       /62 (Site: Right Upper Arm, Position: Sitting, Cuff Size: Large Adult)   Pulse 76   Temp 98.1 °F (36.7 °C) (Tympanic)   Resp 18   Ht 5' 7\" (1.702 m)   Wt 195 lb (88.5 kg)   LMP  (LMP Unknown)   SpO2 96%   BMI 30.54 kg/m²   Impression   No acute fracture or dislocation.               Assessment:      Encounter Diagnosis   Name Primary?  Sprain of other ligament of right ankle, initial encounter Yes           Plan:      Atypical sprain. More of a hyperextension injury/high ankle sprain. Cont. Brace, crutches, elevation. She avoids nsaids due to stomach concerns/diarrhea side effects. Using tylenol  Swelling down /improved at present. wbat.        Bennet Jeans, MD

## 2022-05-31 ENCOUNTER — PATIENT MESSAGE (OUTPATIENT)
Dept: PRIMARY CARE CLINIC | Age: 52
End: 2022-05-31

## 2022-05-31 DIAGNOSIS — F51.01 PRIMARY INSOMNIA: Primary | ICD-10-CM

## 2022-05-31 RX ORDER — ALPRAZOLAM 0.25 MG/1
0.25 TABLET ORAL NIGHTLY PRN
Qty: 30 TABLET | Refills: 0 | Status: SHIPPED | OUTPATIENT
Start: 2022-05-31 | End: 2022-07-15 | Stop reason: SDUPTHER

## 2022-05-31 NOTE — TELEPHONE ENCOUNTER
From: Frank Gay  To: Dr. Ahmet Cagle: 5/31/2022 9:59 AM EDT  Subject: Xanax    Miriam-can I get a refill of my Xanax called into [facility]? Thanks!

## 2022-07-09 ENCOUNTER — HOSPITAL ENCOUNTER (OUTPATIENT)
Dept: INTERVENTIONAL RADIOLOGY/VASCULAR | Age: 52
Discharge: HOME | End: 2022-07-11
Payer: COMMERCIAL

## 2022-07-09 ENCOUNTER — HOSPITAL ENCOUNTER (OUTPATIENT)
Age: 52
Discharge: HOME | End: 2022-07-11
Payer: COMMERCIAL

## 2022-07-09 ENCOUNTER — OFFICE VISIT (OUTPATIENT)
Dept: PRIMARY CARE CLINIC | Age: 52
End: 2022-07-09
Payer: COMMERCIAL

## 2022-07-09 VITALS
HEART RATE: 68 BPM | RESPIRATION RATE: 18 BRPM | BODY MASS INDEX: 32.02 KG/M2 | TEMPERATURE: 98.8 F | DIASTOLIC BLOOD PRESSURE: 68 MMHG | WEIGHT: 204 LBS | SYSTOLIC BLOOD PRESSURE: 104 MMHG | HEIGHT: 67 IN | OXYGEN SATURATION: 98 %

## 2022-07-09 DIAGNOSIS — M25.471 PAIN AND SWELLING OF ANKLE, RIGHT: ICD-10-CM

## 2022-07-09 DIAGNOSIS — M25.571 PAIN AND SWELLING OF ANKLE, RIGHT: ICD-10-CM

## 2022-07-09 DIAGNOSIS — M79.661 RIGHT CALF PAIN: ICD-10-CM

## 2022-07-09 DIAGNOSIS — M79.661 RIGHT CALF PAIN: Primary | ICD-10-CM

## 2022-07-09 PROCEDURE — 93971 EXTREMITY STUDY: CPT

## 2022-07-09 PROCEDURE — 99214 OFFICE O/P EST MOD 30 MIN: CPT | Performed by: NURSE PRACTITIONER

## 2022-07-09 ASSESSMENT — ENCOUNTER SYMPTOMS
SHORTNESS OF BREATH: 0
COLOR CHANGE: 0

## 2022-07-09 NOTE — PROGRESS NOTES
Subjective:      Patient ID: Sejal Fischer is a 46 y.o. female coming in for   Chief Complaint   Patient presents with    Ankle Injury     right ankle injury from 5/23. Still having edema and pain. Presents to  with concerns of right ankle pain/swelling. Has been dealing with this since injury on 5/23/22 when she fell in her shower. Pt did get xray that day, no acute fractures seen. Was non weight bearing for approx a week with aircast immobilizer. Pt has gradually been able to bear weight but still having pain and some swelling. Also concerned with new onset of right calf pain over past few days, does have past history of DVT's, is not on any type of blood thinners at this time. Review of Systems   Respiratory: Negative for shortness of breath. Cardiovascular: Negative for chest pain and palpitations. Musculoskeletal: Positive for arthralgias and joint swelling. Skin: Negative for color change. Objective:  /68 (Site: Right Upper Arm, Position: Sitting, Cuff Size: Medium Adult)   Pulse 68   Temp 98.8 °F (37.1 °C) (Tympanic)   Resp 18   Ht 5' 7\" (1.702 m)   Wt 204 lb (92.5 kg)   SpO2 98%   BMI 31.95 kg/m²      Physical Exam  Vitals and nursing note reviewed. Constitutional:       General: She is not in acute distress. Appearance: Normal appearance. She is not ill-appearing. HENT:      Head: Normocephalic. Cardiovascular:      Rate and Rhythm: Normal rate and regular rhythm. Pulses:           Dorsalis pedis pulses are 2+ on the right side. Posterior tibial pulses are 2+ on the right side. Heart sounds: Normal heart sounds. Pulmonary:      Effort: Pulmonary effort is normal.      Breath sounds: Normal breath sounds. Musculoskeletal:      Right lower leg: Tenderness (mild-mod posterior calf pain to mid/lower calf) present. No swelling. No edema. Left lower leg: No swelling. No edema.         Legs:         Feet:    Skin:     General: Skin is warm and dry. Findings: No rash. Neurological:      General: No focal deficit present. Mental Status: She is alert and oriented to person, place, and time. Assessment:      1. Right calf pain    2. Pain and swelling of ankle, right           Plan:    -doppler, negative today  -pt to have mri and ortho referral to further eval/treat ongoing ankle issues. -pt is WBAT    Orders Placed This Encounter   Procedures    VL DUP LOWER EXTREMITY VENOUS RIGHT     Standing Status:   Future     Standing Expiration Date:   7/9/2023     Order Specific Question:   Reason for exam:     Answer:   right calf pain, previous DVT    MRI ANKLE RIGHT WO CONTRAST     Standing Status:   Future     Standing Expiration Date:   7/9/2023     Order Specific Question:   Reason for exam:     Answer:   ongoing pain/swelling since 5/23/22     Order Specific Question:   What is the sedation requirement? Answer:   Jenniffer Cuellar MD, Orthopedic Surgery, [place]     Referral Priority:   Routine     Referral Type:   Eval and Treat     Referral Reason:   Specialty Services Required     Referred to Provider:   Diana Jimenez MD     Requested Specialty:   Orthopedic Surgery     Number of Visits Requested:   1      Outpatient Encounter Medications as of 7/9/2022   Medication Sig Dispense Refill    fluticasone (FLONASE) 50 MCG/ACT nasal spray 2 sprays by Nasal route daily 1 Bottle 3    esomeprazole (NEXIUM) 40 MG delayed release capsule Take 1 capsule by mouth daily 90 capsule 3    metoprolol succinate (TOPROL XL) 25 MG extended release tablet Take 25 mg by mouth daily      Alum Hydroxide-Mag Carbonate (GAVISCON PO) Take 2 tablets by mouth nightly.  [DISCONTINUED] ibuprofen (ADVIL;MOTRIN) 800 MG tablet Take 1 tablet by mouth every 8 hours as needed for Pain 30 tablet 0     No facility-administered encounter medications on file as of 7/9/2022.             Dorothy Colón, APRN - CNP

## 2022-07-14 ENCOUNTER — TELEPHONE (OUTPATIENT)
Dept: PRIMARY CARE CLINIC | Age: 52
End: 2022-07-14

## 2022-07-15 ENCOUNTER — PATIENT MESSAGE (OUTPATIENT)
Dept: PRIMARY CARE CLINIC | Age: 52
End: 2022-07-15

## 2022-07-15 DIAGNOSIS — F51.01 PRIMARY INSOMNIA: ICD-10-CM

## 2022-07-15 RX ORDER — ALPRAZOLAM 0.25 MG/1
0.25 TABLET ORAL NIGHTLY PRN
Qty: 30 TABLET | Refills: 0 | Status: SHIPPED | OUTPATIENT
Start: 2022-07-15 | End: 2022-08-14

## 2022-08-11 ENCOUNTER — OFFICE VISIT (OUTPATIENT)
Dept: FAMILY MEDICINE CLINIC | Age: 52
End: 2022-08-11
Payer: COMMERCIAL

## 2022-08-11 VITALS
DIASTOLIC BLOOD PRESSURE: 68 MMHG | HEART RATE: 68 BPM | SYSTOLIC BLOOD PRESSURE: 118 MMHG | BODY MASS INDEX: 31.23 KG/M2 | WEIGHT: 199 LBS | HEIGHT: 67 IN

## 2022-08-11 DIAGNOSIS — F41.9 ANXIETY: Primary | ICD-10-CM

## 2022-08-11 DIAGNOSIS — I47.29 VENTRICULAR TACHYCARDIA, INDUCIBLE: ICD-10-CM

## 2022-08-11 DIAGNOSIS — N95.1 MENOPAUSAL SYMPTOMS: ICD-10-CM

## 2022-08-11 DIAGNOSIS — K21.9 GASTROESOPHAGEAL REFLUX DISEASE WITHOUT ESOPHAGITIS: ICD-10-CM

## 2022-08-11 DIAGNOSIS — Z00.00 HEALTHCARE MAINTENANCE: ICD-10-CM

## 2022-08-11 DIAGNOSIS — L72.3 INFLAMED SEBACEOUS CYST: ICD-10-CM

## 2022-08-11 DIAGNOSIS — K58.2 IRRITABLE BOWEL SYNDROME WITH BOTH CONSTIPATION AND DIARRHEA: ICD-10-CM

## 2022-08-11 DIAGNOSIS — I82.431 ACUTE DEEP VEIN THROMBOSIS (DVT) OF POPLITEAL VEIN OF RIGHT LOWER EXTREMITY (HCC): ICD-10-CM

## 2022-08-11 PROCEDURE — 99214 OFFICE O/P EST MOD 30 MIN: CPT | Performed by: FAMILY MEDICINE

## 2022-08-11 RX ORDER — FLUTICASONE PROPIONATE 50 MCG
2 SPRAY, SUSPENSION (ML) NASAL DAILY
Qty: 1 EACH | Refills: 3 | Status: SHIPPED | OUTPATIENT
Start: 2022-08-11

## 2022-08-11 RX ORDER — ESOMEPRAZOLE MAGNESIUM 40 MG/1
40 CAPSULE, DELAYED RELEASE ORAL DAILY
Qty: 90 CAPSULE | Refills: 3 | Status: SHIPPED | OUTPATIENT
Start: 2022-08-11

## 2022-08-11 ASSESSMENT — ENCOUNTER SYMPTOMS
ALLERGIC/IMMUNOLOGIC NEGATIVE: 1
DIARRHEA: 1
CONSTIPATION: 1
NAUSEA: 0
RESPIRATORY NEGATIVE: 1
VOMITING: 0
EYES NEGATIVE: 1
ANAL BLEEDING: 0
ABDOMINAL PAIN: 0

## 2022-08-11 NOTE — PROGRESS NOTES
Subjective:      Patient ID: Dave López is a 46 y.o. female. HPI   Routine follow up on chronic medical conditions. Gaining wt. Over the last year. 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 She reports her cardiologist does not want her to exercise due to \" Arythmogenic right ventricular cardiomyopathy\"    Menopause ongoing. Decreased libido. ibs symptoms intermittent/ongoing. Some food triggers (salad). Alternating constipation and diarrhea. More generalized abdominal aches. History of  dvt, but no residual leg swelling. icd placed for monomorphic VT. No discharges. gerd variable despite daily omeprazole. Anxiety a little worse with new job. Some stressors and more travel involved. Still adjusting to the new job. Past Medical History:   Diagnosis Date    Anxiety     DVT of popliteal vein ([address] 08/2018    right leg, following knee surgery    Reflux     Ventricular tachycardia, inducible ([address] 01/18/2019    syncope->abnormal ekg/ischemia?->normal cath. ->ep with sustained/non sustained/inducible monomorphic VT [facility]     Past Surgical History:   Procedure Laterality Date    CARDIAC DEFIBRILLATOR PLACEMENT Left 01/2019    [facility].  sustained VT, non sustained/inducible VT on EP study. CHOLECYSTECTOMY  12/23/2010    COLONOSCOPY  06/04/2018    [facility], single polyp cecum, diverticuli , follow up 5 years. COLONOSCOPY N/A 6/10/2020    *COLONOSCOPY performed by Selina Moon DO at   [address]  ~2000    Dr. Elijah Abbasi   fibroid/bleeding    KNEE SURGERY  11/14/2016    KNEE SURGERY Right 07/02/2018    rt knee scope, scar debridement, MMD, patellofemoral ligament repair, microfracture of medial femoral condyle    LAPAROSCOPY  09/12/2012    Exploratory; lysis of adhesions. UPPER GASTROINTESTINAL ENDOSCOPY  07/23/2012    Grade 2 esophagitis.     UPPER GASTROINTESTINAL ENDOSCOPY  12/17/2010    small hiatal hernia    UPPER GASTROINTESTINAL ENDOSCOPY N/A 6/10/2020    EGD BIOPSY performed by Claudio Barth DO at [facility] OR     Current Outpatient Medications   Medication Sig Dispense Refill    ALPRAZolam (XANAX) 0.25 MG tablet Take 1 tablet by mouth nightly as needed for Sleep for up to 30 days. 30 tablet 0    fluticasone (FLONASE) 50 MCG/ACT nasal spray 2 sprays by Nasal route daily 1 Bottle 3    esomeprazole (NEXIUM) 40 MG delayed release capsule Take 1 capsule by mouth daily 90 capsule 3    metoprolol succinate (TOPROL XL) 25 MG extended release tablet Take 25 mg by mouth daily      Alum Hydroxide-Mag Carbonate (GAVISCON PO) Take 2 tablets by mouth nightly. No current facility-administered medications for this visit. Review of Systems   Constitutional:  Positive for unexpected weight change (up). HENT: Negative. Eyes: Negative. Respiratory: Negative. Cardiovascular: Negative. Gastrointestinal:  Positive for constipation and diarrhea. Negative for abdominal pain, anal bleeding, nausea and vomiting. Endocrine: Negative. Genitourinary: Negative. Musculoskeletal: Negative. Skin: Negative. Allergic/Immunologic: Negative. Neurological: Negative. Hematological: Negative. Psychiatric/Behavioral: Negative. Objective:   Physical Exam  Constitutional:       General: She is not in acute distress. Appearance: She is well-developed. HENT:      Head: Normocephalic and atraumatic. Right Ear: External ear normal.      Left Ear: External ear normal.      Mouth/Throat:      Pharynx: No oropharyngeal exudate. Eyes:      General: No scleral icterus. Conjunctiva/sclera: Conjunctivae normal.   Neck:      Thyroid: No thyromegaly. Cardiovascular:      Rate and Rhythm: Normal rate and regular rhythm. Heart sounds: Normal heart sounds. No murmur heard.   Pulmonary:      Effort: Pulmonary effort is normal. No respiratory distress. Breath sounds: Normal breath sounds. No wheezing. Abdominal:      General: Bowel sounds are normal. There is no distension. Palpations: Abdomen is soft. Tenderness: There is no abdominal tenderness. There is no rebound. Musculoskeletal:         General: No tenderness. Normal range of motion. Cervical back: Neck supple. Skin:     General: Skin is warm and dry. Findings: No erythema or rash. Neurological:      Mental Status: She is alert and oriented to person, place, and time. Psychiatric:         Behavior: Behavior normal.         Thought Content: Thought content normal.         Judgment: Judgment normal.     /68 (Site: Right Upper Arm, Position: Sitting, Cuff Size: Large Adult)   Pulse 68   Ht 5' 7\" (1.702 m)   Wt 199 lb (90.3 kg)   BMI 31.17 kg/m²     Labs pending draw. Assessment:      Encounter Diagnoses   Name Primary? Anxiety Yes    Gastroesophageal reflux disease without esophagitis     Ventricular tachycardia, inducible (HCC)     Acute deep vein thrombosis (DVT) of popliteal vein of right lower extremity (HCC)     Irritable bowel syndrome with both constipation and diarrhea     Inflamed sebaceous cyst              Plan:      Updating annual mammogram 4/22/21: negative    Anxiety: fair. Using alprazolam just prn at present. New job with some increased stressors. Needing refilling . Still using prn /overwhelmed. Gerd: trying to eat better. Intermittent perceived symptoms, reflux during the night up into the throat. Cont. Omeprazole and use tums or gaviscon . Reviewed lifestyle changes. VT:s/p icd. No discharges. On toprol. Cardiology following. Cont. Current toprol dose and cardiology follow up. Negative genetic testing. EP cardiologist diagnosed \"arrhythmogenic right ventricular cardiomyopathy. \"      dvt following knee surgery right leg. Asx, resolved. Off anticoagulation at present. No recurrent concerns. Avoiding prolonged sitting. Ibs. Alternating . Consider fiber supplement bid. Stable overall      Declines flu vaccine. Colonoscopy 6/10/2020 with mild sigmoid diverticulosis, int/ext hemorrhoids. No polyps. 10 yr follow up. Seb cyst removed from left posterior shoulder. Scarred in and hard to remove. Some scar tissues a little larger. Medial end a little more palpable. Cant rule recurrent cyst at this size. Observation. Menopausal: s/p ablation and no menses in 12 yrs. Now with hot flashes and roller coaster emotions. Rec. Calcium vit d 2 /day.    Maria Dolores Santana MD

## 2022-08-12 ENCOUNTER — HOSPITAL ENCOUNTER (OUTPATIENT)
Dept: LAB | Age: 52
Discharge: HOME | End: 2022-08-12
Payer: COMMERCIAL

## 2022-08-12 DIAGNOSIS — K21.9 GASTROESOPHAGEAL REFLUX DISEASE WITHOUT ESOPHAGITIS: ICD-10-CM

## 2022-08-12 DIAGNOSIS — N95.1 MENOPAUSAL SYMPTOMS: ICD-10-CM

## 2022-08-12 LAB
ABSOLUTE EOS #: 0.28 K/UL (ref 0–0.44)
ABSOLUTE IMMATURE GRANULOCYTE: 0.04 K/UL (ref 0–0.3)
ABSOLUTE LYMPH #: 1.72 K/UL (ref 1.1–3.7)
ABSOLUTE MONO #: 0.34 K/UL (ref 0.1–1.2)
ANION GAP SERPL CALCULATED.3IONS-SCNC: 10 MMOL/L (ref 9–17)
BASOPHILS # BLD: 0 % (ref 0–2)
BASOPHILS ABSOLUTE: <0.03 K/UL (ref 0–0.2)
BUN BLDV-MCNC: 15 MG/DL (ref 6–20)
BUN/CREAT BLD: 16 (ref 9–20)
CALCIUM SERPL-MCNC: 9.4 MG/DL (ref 8.6–10.4)
CHLORIDE BLD-SCNC: 105 MMOL/L (ref 98–107)
CO2: 28 MMOL/L (ref 20–31)
CREAT SERPL-MCNC: 0.91 MG/DL (ref 0.5–0.9)
EOSINOPHILS RELATIVE PERCENT: 6 % (ref 1–4)
FOLLICLE STIMULATING HORMONE: 89.6 MIU/ML (ref 1.7–21.5)
GFR AFRICAN AMERICAN: >60 ML/MIN
GFR NON-AFRICAN AMERICAN: >60 ML/MIN
GFR SERPL CREATININE-BSD FRML MDRD: ABNORMAL ML/MIN/{1.73_M2}
GLUCOSE BLD-MCNC: 102 MG/DL (ref 70–99)
HCT VFR BLD CALC: 40.5 % (ref 36.3–47.1)
HEMOGLOBIN: 13.5 G/DL (ref 11.9–15.1)
IMMATURE GRANULOCYTES: 1 %
LYMPHOCYTES # BLD: 34 % (ref 24–43)
MCH RBC QN AUTO: 30.1 PG (ref 25.2–33.5)
MCHC RBC AUTO-ENTMCNC: 33.3 G/DL (ref 25.2–33.5)
MCV RBC AUTO: 90.4 FL (ref 82.6–102.9)
MONOCYTES # BLD: 7 % (ref 3–12)
NRBC AUTOMATED: 0 PER 100 WBC
PDW BLD-RTO: 12.4 % (ref 11.8–14.4)
PLATELET # BLD: 279 K/UL (ref 138–453)
PMV BLD AUTO: 10.4 FL (ref 8.1–13.5)
POTASSIUM SERPL-SCNC: 4.3 MMOL/L (ref 3.7–5.3)
RBC # BLD: 4.48 M/UL (ref 3.95–5.11)
SEG NEUTROPHILS: 52 % (ref 36–65)
SEGMENTED NEUTROPHILS ABSOLUTE COUNT: 2.68 K/UL (ref 1.5–8.1)
SODIUM BLD-SCNC: 143 MMOL/L (ref 135–144)
WBC # BLD: 5.1 K/UL (ref 3.5–11.3)

## 2022-08-12 PROCEDURE — 85025 COMPLETE CBC W/AUTO DIFF WBC: CPT

## 2022-08-12 PROCEDURE — 36415 COLL VENOUS BLD VENIPUNCTURE: CPT

## 2022-08-12 PROCEDURE — 83001 ASSAY OF GONADOTROPIN (FSH): CPT

## 2022-08-12 PROCEDURE — 80048 BASIC METABOLIC PNL TOTAL CA: CPT

## 2022-08-15 ENCOUNTER — PATIENT MESSAGE (OUTPATIENT)
Dept: FAMILY MEDICINE CLINIC | Age: 52
End: 2022-08-15

## 2022-08-15 DIAGNOSIS — Z87.891 SMOKING HISTORY: Primary | ICD-10-CM

## 2022-08-15 NOTE — TELEPHONE ENCOUNTER
Kadi-the glucose is the BMP panel which yours was 102. The normal is up to 99 so not too much over. I don't see any notes in regards to the CT of the lungs-was it d/t to check for any lung abnormalities from previously smoking?   Asia Anguiano

## 2022-09-10 ENCOUNTER — OFFICE VISIT (OUTPATIENT)
Dept: PRIMARY CARE CLINIC | Age: 52
End: 2022-09-10
Payer: COMMERCIAL

## 2022-09-10 ENCOUNTER — HOSPITAL ENCOUNTER (OUTPATIENT)
Age: 52
Discharge: HOME | End: 2022-09-10
Payer: COMMERCIAL

## 2022-09-10 VITALS
WEIGHT: 196 LBS | HEIGHT: 67 IN | OXYGEN SATURATION: 98 % | SYSTOLIC BLOOD PRESSURE: 98 MMHG | TEMPERATURE: 98.1 F | HEART RATE: 60 BPM | DIASTOLIC BLOOD PRESSURE: 68 MMHG | BODY MASS INDEX: 30.76 KG/M2

## 2022-09-10 DIAGNOSIS — R30.0 DYSURIA: Primary | ICD-10-CM

## 2022-09-10 DIAGNOSIS — R30.0 DYSURIA: ICD-10-CM

## 2022-09-10 LAB
BACTERIA: ABNORMAL
BILIRUBIN URINE: NEGATIVE
EPITHELIAL CELLS UA: ABNORMAL /HPF (ref 0–5)
GLUCOSE URINE: NEGATIVE
KETONES, URINE: NEGATIVE
LEUKOCYTE ESTERASE, URINE: ABNORMAL
NITRITE, URINE: NEGATIVE
PH UA: 7 (ref 5–6)
PROTEIN UA: NEGATIVE
RBC UA: ABNORMAL /HPF (ref 0–4)
SPECIFIC GRAVITY UA: 1.01 (ref 1.01–1.02)
URINE HGB: NEGATIVE
UROBILINOGEN, URINE: NORMAL
WBC UA: ABNORMAL /HPF (ref 0–4)

## 2022-09-10 PROCEDURE — 99213 OFFICE O/P EST LOW 20 MIN: CPT | Performed by: FAMILY MEDICINE

## 2022-09-10 PROCEDURE — 81001 URINALYSIS AUTO W/SCOPE: CPT

## 2022-09-10 RX ORDER — SULFAMETHOXAZOLE AND TRIMETHOPRIM 800; 160 MG/1; MG/1
1 TABLET ORAL 2 TIMES DAILY
Qty: 14 TABLET | Refills: 0 | Status: SHIPPED | OUTPATIENT
Start: 2022-09-10 | End: 2022-09-17

## 2022-09-10 ASSESSMENT — ENCOUNTER SYMPTOMS
EYES NEGATIVE: 1
RESPIRATORY NEGATIVE: 1
GASTROINTESTINAL NEGATIVE: 1

## 2022-09-10 NOTE — PROGRESS NOTES
Subjective:      Patient ID: Matthew Dong is a 46 y.o. female. HPI  acute urgent care visit for uti concerns. Some flank and abdominal discomfort, suprapubic area and right side. Some nausea and urgency. No dysuria. Frequency endorsed. Heading for vacation in [place] tomorrow. Past Medical History:   Diagnosis Date    Anxiety     DVT of popliteal vein ([facility] [address] 08/2018    right leg, following knee surgery    Reflux     Ventricular tachycardia, inducible ([address] 01/18/2019    syncope->abnormal ekg/ischemia?->normal cath. ->ep with sustained/non sustained/inducible monomorphic VT [facility]     Past Surgical History:   Procedure Laterality Date    CARDIAC DEFIBRILLATOR PLACEMENT Left 01/2019    [facility].  sustained VT, non sustained/inducible VT on EP study. CHOLECYSTECTOMY  12/23/2010    COLONOSCOPY  06/04/2018    [facility], single polyp cecum, diverticuli , follow up 5 years. COLONOSCOPY N/A 6/10/2020    *COLONOSCOPY performed by Adela Dumont DO at   [facility] [address] [facility] [address]  ~2000    Dr. Leo Morin   fibroid/bleeding    KNEE SURGERY  11/14/2016    KNEE SURGERY Right 07/02/2018    rt knee scope, scar debridement, MMD, patellofemoral ligament repair, microfracture of medial femoral condyle    LAPAROSCOPY  09/12/2012    Exploratory; lysis of adhesions. UPPER GASTROINTESTINAL ENDOSCOPY  07/23/2012    Grade 2 esophagitis. UPPER GASTROINTESTINAL ENDOSCOPY  12/17/2010    small hiatal hernia    UPPER GASTROINTESTINAL ENDOSCOPY N/A 6/10/2020    EGD BIOPSY performed by Adela Dumont DO at [facility] OR     Current Outpatient Medications   Medication Sig Dispense Refill    ALPRAZolam (XANAX PO) Take by mouth      sertraline (ZOLOFT) 25 MG tablet Take 1 tablet by mouth daily 30 tablet 3    esomeprazole (NEXIUM) 40 MG delayed release capsule Take 1 capsule by mouth in the morning.  90 capsule 3    fluticasone (FLONASE) 50 MCG/ACT nasal spray 2 sprays by Nasal route in the morning. 1 each 3    metoprolol succinate (TOPROL XL) 25 MG extended release tablet Take 25 mg by mouth daily      Alum Hydroxide-Mag Carbonate (GAVISCON PO) Take 2 tablets by mouth nightly. No current facility-administered medications for this visit. Allergies   Allergen Reactions    Doxycycline Calcium Nausea And Vomiting    Meloxicam Rash    Oxycodone-Acetaminophen Rash         Review of Systems   Constitutional: Negative. HENT: Negative. Eyes: Negative. Respiratory: Negative. Cardiovascular: Negative. Gastrointestinal: Negative. Genitourinary:  Positive for dysuria, flank pain, frequency and urgency. Skin: Negative. Neurological: Negative. Psychiatric/Behavioral: Negative. Objective:   Physical Exam  Constitutional:       Appearance: Normal appearance. HENT:      Head: Normocephalic and atraumatic. Cardiovascular:      Rate and Rhythm: Normal rate. Pulmonary:      Effort: Pulmonary effort is normal. No respiratory distress. Breath sounds: No wheezing. Abdominal:      Tenderness: There is abdominal tenderness (suprapubic, mild). Musculoskeletal:         General: Normal range of motion. Skin:     General: Skin is warm. Neurological:      General: No focal deficit present. Mental Status: She is alert.      BP 98/68   Pulse 60   Temp 98.1 °F (36.7 °C) (Tympanic)   Ht 5' 7\" (1.702 m)   Wt 196 lb (88.9 kg)   SpO2 98%   BMI 30.70 kg/m²   Hospital Outpatient Visit on 09/10/2022   Component Date Value Ref Range Status    Glucose, Ur 09/10/2022 NEGATIVE  NEGATIVE Final    Bilirubin Urine 09/10/2022 NEGATIVE  NEGATIVE Final    Ketones, Urine 09/10/2022 NEGATIVE  NEGATIVE Final    Specific Gravity, UA 09/10/2022 1.015  1.010 - 1.025 Final    Urine Hgb 09/10/2022 NEGATIVE  NEGATIVE Final    pH, UA 09/10/2022 7.0 (A) 5.0 - 6.0 Final    Protein, UA 09/10/2022 NEGATIVE  NEGATIVE Final    Urobilinogen, Urine 09/10/2022 Normal  Normal Final    Nitrite, Urine 09/10/2022 NEGATIVE  NEGATIVE Final    Leukocyte Esterase, Urine 09/10/2022 1+ (A) NEGATIVE Final    WBC, UA 09/10/2022 5 TO 10  0 - 4 /HPF Final    RBC, UA 09/10/2022 None  0 - 4 /HPF Final    Epithelial Cells UA 09/10/2022 25 TO 50  0 - 5 /HPF Final    Bacteria, UA 09/10/2022 1+ (A) None Final       Assessment:    Acute uti      Plan:      Bactrim ds bid x 7 days  Increase fluids.             Darryle Goods, MD

## 2022-10-06 ENCOUNTER — HOSPITAL ENCOUNTER (OUTPATIENT)
Dept: LAB | Age: 52
Discharge: HOME | End: 2022-10-06
Payer: COMMERCIAL

## 2022-10-06 ENCOUNTER — HOSPITAL ENCOUNTER (OUTPATIENT)
Dept: CT IMAGING | Age: 52
Discharge: HOME | End: 2022-10-08
Payer: COMMERCIAL

## 2022-10-06 ENCOUNTER — OFFICE VISIT (OUTPATIENT)
Dept: PRIMARY CARE CLINIC | Age: 52
End: 2022-10-06
Payer: COMMERCIAL

## 2022-10-06 VITALS
RESPIRATION RATE: 14 BRPM | TEMPERATURE: 97.3 F | WEIGHT: 192.38 LBS | HEART RATE: 54 BPM | SYSTOLIC BLOOD PRESSURE: 100 MMHG | HEIGHT: 67 IN | OXYGEN SATURATION: 97 % | DIASTOLIC BLOOD PRESSURE: 60 MMHG | BODY MASS INDEX: 30.19 KG/M2

## 2022-10-06 DIAGNOSIS — R10.11 RIGHT UPPER QUADRANT ABDOMINAL PAIN: ICD-10-CM

## 2022-10-06 DIAGNOSIS — R10.11 RIGHT UPPER QUADRANT ABDOMINAL PAIN: Primary | ICD-10-CM

## 2022-10-06 LAB
ABSOLUTE EOS #: 0.12 K/UL (ref 0–0.44)
ABSOLUTE IMMATURE GRANULOCYTE: 0.03 K/UL (ref 0–0.3)
ABSOLUTE LYMPH #: 1.51 K/UL (ref 1.1–3.7)
ABSOLUTE MONO #: 0.32 K/UL (ref 0.1–1.2)
ALBUMIN SERPL-MCNC: 4.3 G/DL (ref 3.5–5.2)
ALBUMIN/GLOBULIN RATIO: 1.8 (ref 1–2.5)
ALP BLD-CCNC: 52 U/L (ref 35–104)
ALT SERPL-CCNC: 17 U/L (ref 5–33)
ANION GAP SERPL CALCULATED.3IONS-SCNC: 9 MMOL/L (ref 9–17)
AST SERPL-CCNC: 13 U/L
BASOPHILS # BLD: 1 % (ref 0–2)
BASOPHILS ABSOLUTE: 0.03 K/UL (ref 0–0.2)
BILIRUB SERPL-MCNC: 0.3 MG/DL (ref 0.3–1.2)
BUN BLDV-MCNC: 17 MG/DL (ref 6–20)
BUN/CREAT BLD: 19 (ref 9–20)
CALCIUM SERPL-MCNC: 9.6 MG/DL (ref 8.6–10.4)
CHLORIDE BLD-SCNC: 104 MMOL/L (ref 98–107)
CO2: 29 MMOL/L (ref 20–31)
CREAT SERPL-MCNC: 0.91 MG/DL (ref 0.5–0.9)
EOSINOPHILS RELATIVE PERCENT: 2 % (ref 1–4)
GFR SERPL CREATININE-BSD FRML MDRD: >60 ML/MIN/1.73M2
GLUCOSE BLD-MCNC: 113 MG/DL (ref 70–99)
HCT VFR BLD CALC: 40.9 % (ref 36.3–47.1)
HEMOGLOBIN: 13.9 G/DL (ref 11.9–15.1)
IMMATURE GRANULOCYTES: 1 %
LYMPHOCYTES # BLD: 24 % (ref 24–43)
MCH RBC QN AUTO: 30.3 PG (ref 25.2–33.5)
MCHC RBC AUTO-ENTMCNC: 34 G/DL (ref 25.2–33.5)
MCV RBC AUTO: 89.3 FL (ref 82.6–102.9)
MONOCYTES # BLD: 5 % (ref 3–12)
NRBC AUTOMATED: 0 PER 100 WBC
PDW BLD-RTO: 13.2 % (ref 11.8–14.4)
PLATELET # BLD: 287 K/UL (ref 138–453)
PMV BLD AUTO: 10.3 FL (ref 8.1–13.5)
POTASSIUM SERPL-SCNC: 4 MMOL/L (ref 3.7–5.3)
RBC # BLD: 4.58 M/UL (ref 3.95–5.11)
SEG NEUTROPHILS: 67 % (ref 36–65)
SEGMENTED NEUTROPHILS ABSOLUTE COUNT: 4.23 K/UL (ref 1.5–8.1)
SODIUM BLD-SCNC: 142 MMOL/L (ref 135–144)
TOTAL PROTEIN: 6.7 G/DL (ref 6.4–8.3)
WBC # BLD: 6.2 K/UL (ref 3.5–11.3)

## 2022-10-06 PROCEDURE — 2709999900 CT ABDOMEN PELVIS W IV CONTRAST

## 2022-10-06 PROCEDURE — 99214 OFFICE O/P EST MOD 30 MIN: CPT | Performed by: NURSE PRACTITIONER

## 2022-10-06 PROCEDURE — 6360000004 HC RX CONTRAST MEDICATION: Performed by: NURSE PRACTITIONER

## 2022-10-06 PROCEDURE — 85025 COMPLETE CBC W/AUTO DIFF WBC: CPT

## 2022-10-06 PROCEDURE — 36415 COLL VENOUS BLD VENIPUNCTURE: CPT

## 2022-10-06 PROCEDURE — 80053 COMPREHEN METABOLIC PANEL: CPT

## 2022-10-06 RX ORDER — OMEPRAZOLE 40 MG/1
CAPSULE, DELAYED RELEASE ORAL
COMMUNITY
Start: 2022-08-11

## 2022-10-06 RX ADMIN — IOPAMIDOL 100 ML: 755 INJECTION, SOLUTION INTRAVENOUS at 13:14

## 2022-10-06 ASSESSMENT — ENCOUNTER SYMPTOMS
NAUSEA: 1
BLOOD IN STOOL: 0
RESPIRATORY NEGATIVE: 1
VOMITING: 0
ABDOMINAL PAIN: 1
DIARRHEA: 1

## 2022-10-06 NOTE — PROGRESS NOTES
[facility]             [address], [address]                        Telephone [phone]             Fax [phone]     Isra Douglas  1970  U:[id number]   Date of visit:  10/6/2022    Subjective:    Isra Douglas is a 46 y.o.  female who presents to [facility] today (10/6/2022) for evaluation of:    Chief Complaint   Patient presents with    Abdominal Pain     Lower abd pain radiates up to mid abd. She states she has been nausea, also having diarrhea, its been going on for a week. Abdominal Pain  This is a new problem. The current episode started in the past 7 days (X 1 week). The onset quality is sudden. The problem occurs constantly. The problem has been unchanged. The pain is located in the RUQ and RLQ. The pain is at a severity of 6/10. The quality of the pain is sharp. The abdominal pain does not radiate. Associated symptoms include diarrhea (3 episodes yesterday, 30 minutes after she eats) and nausea. Pertinent negatives include no dysuria, fever, frequency, hematuria or vomiting. Nothing aggravates the pain. Relieved by: lying flat. She has tried acetaminophen for the symptoms. The treatment provided no relief. Her past medical history is significant for irritable bowel syndrome. She has the following problem list:  Patient Active Problem List   Diagnosis    Anxiety    Irritable bowel syndrome with both constipation and diarrhea    DVT of popliteal vein (HCC)    Ventricular tachycardia, inducible        Current medications are:  Current Outpatient Medications   Medication Sig Dispense Refill    omeprazole (PRILOSEC) 40 MG delayed release capsule       ALPRAZolam (XANAX PO) Take by mouth      sertraline (ZOLOFT) 25 MG tablet Take 1 tablet by mouth daily 30 tablet 3    esomeprazole (NEXIUM) 40 MG delayed release capsule Take 1 capsule by mouth in the morning.  [address] capsule 3    fluticasone (FLONASE) 50 MCG/ACT nasal spray 2 sprays by Nasal route in the morning. 1 each 3    metoprolol succinate (TOPROL XL) 25 MG extended release tablet Take 25 mg by mouth daily      Alum Hydroxide-Mag Carbonate (GAVISCON PO) Take 2 tablets by mouth nightly. No current facility-administered medications for this visit. She is allergic to doxycycline calcium, meloxicam, and oxycodone-acetaminophen. .    She  reports that she quit smoking about 3 years ago. Her smoking use included cigarettes. She started smoking about 37 years ago. She has a 25.00 pack-year smoking history. She has never used smokeless tobacco.      Objective:    Vitals:    10/06/22 1210   BP: 100/60   Site: Left Upper Arm   Position: Sitting   Cuff Size: Medium Adult   Pulse: 54   Resp: 14   Temp: 97.3 °F (36.3 °C)   TempSrc: Tympanic   SpO2: 97%   Weight: 192 lb 6 oz (87.3 kg)   Height: 5' 7\" (1.702 m)     Body mass index is 30.13 kg/m². Review of Systems   Constitutional: Negative. Negative for fever. Respiratory: Negative. Cardiovascular: Negative. Gastrointestinal:  Positive for abdominal pain, diarrhea (3 episodes yesterday, 30 minutes after she eats) and nausea. Negative for blood in stool and vomiting. Genitourinary:  Negative for dysuria, frequency and hematuria. LMP 15 years ago     Physical Exam  Vitals and nursing note reviewed. Constitutional:       Appearance: Normal appearance. She is well-developed. HENT:      Head: Normocephalic. Jaw: There is normal jaw occlusion. Mouth/Throat:      Lips: Pink. Mouth: Mucous membranes are moist.      Pharynx: Oropharynx is clear. Uvula midline. Eyes:      Pupils: Pupils are equal, round, and reactive to light. Cardiovascular:      Rate and Rhythm: Normal rate and regular rhythm. Heart sounds: Normal heart sounds.    Pulmonary:      Effort: Pulmonary effort is normal.      Breath sounds: Normal breath sounds and air entry.   Abdominal:      General: Abdomen is flat. Bowel sounds are increased. There is no distension. Palpations: Abdomen is soft. Tenderness: There is abdominal tenderness (RUQ>RLQ) in the right upper quadrant and right lower quadrant. Musculoskeletal:      Cervical back: Normal range of motion and neck supple. Skin:     General: Skin is warm and dry. Neurological:      General: No focal deficit present. Mental Status: She is alert and oriented to person, place, and time. Psychiatric:         Behavior: Behavior normal.         Thought Content:  Thought content normal.       Assessment and Plan:    Hospital Outpatient Visit on 10/06/2022   Component Date Value Ref Range Status    WBC 10/06/2022 6.2  3.5 - 11.3 k/uL Final    RBC 10/06/2022 4.58  3.95 - 5.11 m/uL Final    Hemoglobin 10/06/2022 13.9  11.9 - 15.1 g/dL Final    Hematocrit 10/06/2022 40.9  36.3 - 47.1 % Final    MCV 10/06/2022 89.3  82.6 - 102.9 fL Final    MCH 10/06/2022 30.3  25.2 - 33.5 pg Final    MCHC 10/06/2022 34.0 (A)  25.2 - 33.5 g/dL Final    RDW 10/06/2022 13.2  11.8 - 14.4 % Final    Platelets 87/26/3065 287  138 - 453 k/uL Final    MPV 10/06/2022 10.3  8.1 - 13.5 fL Final    NRBC Automated 10/06/2022 0.0  0.0 per 100 WBC Final    Seg Neutrophils 10/06/2022 67 (A)  36 - 65 % Final    Lymphocytes 10/06/2022 24  24 - 43 % Final    Monocytes 10/06/2022 5  3 - 12 % Final    Eosinophils % 10/06/2022 2  1 - 4 % Final    Basophils 10/06/2022 1  0 - 2 % Final    Immature Granulocytes 10/06/2022 1 (A)  0 % Final    Segs Absolute 10/06/2022 4.23  1.50 - 8.10 k/uL Final    Absolute Lymph # 10/06/2022 1.51  1.10 - 3.70 k/uL Final    Absolute Mono # 10/06/2022 0.32  0.10 - 1.20 k/uL Final    Absolute Eos # 10/06/2022 0.12  0.00 - 0.44 k/uL Final    Basophils Absolute 10/06/2022 0.03  0.00 - 0.20 k/uL Final    Absolute Immature Granulocyte 10/06/2022 0.03  0.00 - 0.30 k/uL Final    Glucose 10/06/2022 113 (A)  70 - 99 mg/dL Final BUN 10/06/2022 17  6 - 20 mg/dL Final    Creatinine 10/06/2022 0.91 (A)  0.50 - 0.90 mg/dL Final    Est, Glom Filt Rate 10/06/2022 >60  >60 mL/min/1.73m2 Final    Comment:       Effective Oct 3, 2022        These results are not intended for use in patients <25years of age. eGFR results are calculated without a race factor using the 2021 CKD-EPI equation. Careful clinical correlation is recommended, particularly when comparing to results   calculated using previous equations. The CKD-EPI equation is less accurate in patients with extremes of muscle mass, extra-renal   metabolism of creatine, excessive creatine ingestion, or following therapy that affects   renal tubular secretion. Bun/Cre Ratio 10/06/2022 19  9 - 20 Final    Calcium 10/06/2022 9.6  8.6 - 10.4 mg/dL Final    Sodium 10/06/2022 142  135 - 144 mmol/L Final    Potassium 10/06/2022 4.0  3.7 - 5.3 mmol/L Final    Chloride 10/06/2022 104  98 - 107 mmol/L Final    CO2 10/06/2022 29  20 - 31 mmol/L Final    Anion Gap 10/06/2022 9  9 - 17 mmol/L Final    Alkaline Phosphatase 10/06/2022 52  35 - 104 U/L Final    ALT 10/06/2022 17  5 - 33 U/L Final    AST 10/06/2022 13  <32 U/L Final    Total Bilirubin 10/06/2022 0.3  0.3 - 1.2 mg/dL Final    Total Protein 10/06/2022 6.7  6.4 - 8.3 g/dL Final    Albumin 10/06/2022 4.3  3.5 - 5.2 g/dL Final    Albumin/Globulin Ratio 10/06/2022 1.8  1.0 - 2.5 Final       CT ABDOMEN PELVIS W IV CONTRAST Additional Contrast? None    Result Date: 10/6/2022  EXAMINATION: CT OF THE ABDOMEN AND PELVIS WITH CONTRAST 10/6/2022 1:02 pm TECHNIQUE: CT of the abdomen and pelvis was performed with the administration of intravenous contrast. Multiplanar reformatted images are provided for review. Automated exposure control, iterative reconstruction, and/or weight based adjustment of the mA/kV was utilized to reduce the radiation dose to as low as reasonably achievable.  COMPARISON: 07/11/2012 HISTORY: ORDERING SYSTEM PROVIDED HISTORY: Right upper quadrant abdominal pain TECHNOLOGIST PROVIDED HISTORY: STAT Creatinine as needed:->Yes Reason for Exam: Rt sided abdominal pain, nausea, diarrhea for about a week; hx of cholecystectomy FINDINGS: Lower Chest: No acute findings. Organs: The gallbladder is surgically absent. The liver, pancreas, spleen, kidneys, and adrenals reveal no acute findings. GI/Bowel: There is no bowel dilatation or wall thickening identified. Normal appendix. Pelvis: No acute findings. Nonspecific nodular enhancement in the uterine fundus may rep represent underlying intramural fibroids. Peritoneum/Retroperitoneum: No free air or free fluid. The aorta is normal in caliber. The visceral branches are patent. No lymphadenopathy. Bones/Soft Tissues: No abnormality identified. Small fat containing umbilical hernia. *Unless otherwise specified, incidental findings do not require dedicated imaging follow-up. No acute abnormality identified. Diagnosis Orders   1. Right upper quadrant abdominal pain  CT ABDOMEN PELVIS W IV CONTRAST Additional Contrast? None    CBC with Auto Differential    Comprehensive Metabolic Panel        I discussed lab results and radiology report with patient during visit. I recommended referral to gastroenterology, she would like to speak with PCP first. I recommended bland diet and increase water intake. Follow up with PCP if symptoms persist or worsen. The use, risks, benefits, and side effects of prescribed or recommended medications were discussed. All questions were answered and the patient/caregiver voiced understanding. No orders of the defined types were placed in this encounter.         Electronically signed by SHERRI Morgan CNP on 10/6/22 at 12:19 PM EDT

## 2022-10-12 ENCOUNTER — PATIENT MESSAGE (OUTPATIENT)
Dept: PRIMARY CARE CLINIC | Age: 52
End: 2022-10-12

## 2022-10-12 DIAGNOSIS — F51.01 PRIMARY INSOMNIA: ICD-10-CM

## 2022-10-12 RX ORDER — ALPRAZOLAM 0.25 MG/1
0.25 TABLET ORAL NIGHTLY PRN
Qty: 30 TABLET | Refills: 0 | Status: SHIPPED | OUTPATIENT
Start: 2022-10-12 | End: 2022-11-11

## 2022-10-12 NOTE — TELEPHONE ENCOUNTER
From: Daniel Barrientos  To: Dr. Chan Wen  Sent: 10/12/2022 1:14 PM EDT  Subject: Xanax    Miriam-can I get a refill of my Xanax called into [facility]? Thanks!  Tyra Lopez

## 2022-11-23 ENCOUNTER — APPOINTMENT (OUTPATIENT)
Dept: CT IMAGING | Age: 52
End: 2022-11-23
Payer: OTHER MISCELLANEOUS

## 2022-11-23 ENCOUNTER — APPOINTMENT (OUTPATIENT)
Dept: GENERAL RADIOLOGY | Age: 52
End: 2022-11-23
Payer: OTHER MISCELLANEOUS

## 2022-11-23 ENCOUNTER — HOSPITAL ENCOUNTER (EMERGENCY)
Age: 52
Discharge: HOME | End: 2022-11-23
Attending: EMERGENCY MEDICINE
Payer: OTHER MISCELLANEOUS

## 2022-11-23 VITALS
BODY MASS INDEX: 30.76 KG/M2 | RESPIRATION RATE: 12 BRPM | HEART RATE: 65 BPM | DIASTOLIC BLOOD PRESSURE: 74 MMHG | SYSTOLIC BLOOD PRESSURE: 109 MMHG | TEMPERATURE: 97.8 F | OXYGEN SATURATION: 91 % | WEIGHT: 196 LBS | HEIGHT: 67 IN

## 2022-11-23 DIAGNOSIS — S20.219A CONTUSION OF CHEST WALL, UNSPECIFIED LATERALITY, INITIAL ENCOUNTER: ICD-10-CM

## 2022-11-23 DIAGNOSIS — S80.01XA CONTUSION OF RIGHT KNEE, INITIAL ENCOUNTER: ICD-10-CM

## 2022-11-23 DIAGNOSIS — S09.90XA CLOSED HEAD INJURY, INITIAL ENCOUNTER: ICD-10-CM

## 2022-11-23 DIAGNOSIS — M62.838 TRAPEZIUS MUSCLE SPASM: ICD-10-CM
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
## 2022-11-23 DIAGNOSIS — S40.012A CONTUSION OF LEFT SHOULDER, INITIAL ENCOUNTER: ICD-10-CM

## 2022-11-23 PROCEDURE — 73030 X-RAY EXAM OF SHOULDER: CPT

## 2022-11-23 PROCEDURE — 72125 CT NECK SPINE W/O DYE: CPT

## 2022-11-23 PROCEDURE — 99284 EMERGENCY DEPT VISIT MOD MDM: CPT

## 2022-11-23 PROCEDURE — 6370000000 HC RX 637 (ALT 250 FOR IP): Performed by: EMERGENCY MEDICINE

## 2022-11-23 PROCEDURE — 6360000002 HC RX W HCPCS: Performed by: EMERGENCY MEDICINE

## 2022-11-23 PROCEDURE — 73562 X-RAY EXAM OF KNEE 3: CPT

## 2022-11-23 PROCEDURE — 71045 X-RAY EXAM CHEST 1 VIEW: CPT

## 2022-11-23 PROCEDURE — 96372 THER/PROPH/DIAG INJ SC/IM: CPT

## 2022-11-23 PROCEDURE — 70450 CT HEAD/BRAIN W/O DYE: CPT

## 2022-11-23 RX ORDER — LIDOCAINE 4 G/G
1 PATCH TOPICAL ONCE
Status: DISCONTINUED | OUTPATIENT
Start: 2022-11-23 | End: 2022-11-23 | Stop reason: HOSPADM

## 2022-11-23 RX ORDER — ORPHENADRINE CITRATE 30 MG/ML
60 INJECTION INTRAMUSCULAR; INTRAVENOUS ONCE
Status: COMPLETED | OUTPATIENT
Start: 2022-11-23 | End: 2022-11-23

## 2022-11-23 RX ORDER — KETOROLAC TROMETHAMINE 30 MG/ML
30 INJECTION, SOLUTION INTRAMUSCULAR; INTRAVENOUS ONCE
Status: COMPLETED | OUTPATIENT
Start: 2022-11-23 | End: 2022-11-23

## 2022-11-23 RX ORDER — CYCLOBENZAPRINE HCL 10 MG
10 TABLET ORAL 3 TIMES DAILY PRN
Qty: 21 TABLET | Refills: 0 | Status: SHIPPED | OUTPATIENT
Start: 2022-11-23 | End: 2022-12-03

## 2022-11-23 RX ADMIN — KETOROLAC TROMETHAMINE 30 MG: 30 INJECTION, SOLUTION INTRAMUSCULAR at 19:12

## 2022-11-23 RX ADMIN — ORPHENADRINE CITRATE 60 MG: 30 INJECTION INTRAMUSCULAR; INTRAVENOUS at 19:11

## 2022-11-23 ASSESSMENT — PAIN DESCRIPTION - PAIN TYPE: TYPE: ACUTE PAIN

## 2022-11-23 ASSESSMENT — ENCOUNTER SYMPTOMS
DIARRHEA: 0
NAUSEA: 0
SHORTNESS OF BREATH: 0
CONSTIPATION: 0
ABDOMINAL PAIN: 0
BLOOD IN STOOL: 0
EYE PAIN: 0
BACK PAIN: 0
VOMITING: 0
COUGH: 0

## 2022-11-23 ASSESSMENT — PAIN DESCRIPTION - LOCATION
LOCATION: NECK
LOCATION: HEAD;NECK

## 2022-11-23 ASSESSMENT — PAIN SCALES - GENERAL
PAINLEVEL_OUTOF10: 5
PAINLEVEL_OUTOF10: 6
PAINLEVEL_OUTOF10: 6

## 2022-11-23 ASSESSMENT — PAIN DESCRIPTION - ONSET: ONSET: ON-GOING

## 2022-11-23 ASSESSMENT — PAIN DESCRIPTION - ORIENTATION: ORIENTATION: MID

## 2022-11-23 ASSESSMENT — PAIN - FUNCTIONAL ASSESSMENT
PAIN_FUNCTIONAL_ASSESSMENT: 0-10
PAIN_FUNCTIONAL_ASSESSMENT: 0-10

## 2022-11-23 ASSESSMENT — PAIN DESCRIPTION - DESCRIPTORS: DESCRIPTORS: ACHING

## 2022-11-23 ASSESSMENT — PAIN DESCRIPTION - FREQUENCY: FREQUENCY: CONTINUOUS
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
## 2022-11-28 ENCOUNTER — OFFICE VISIT (OUTPATIENT)
Dept: FAMILY MEDICINE CLINIC | Age: 52
End: 2022-11-28
Payer: OTHER MISCELLANEOUS

## 2022-11-28 VITALS
SYSTOLIC BLOOD PRESSURE: 102 MMHG | OXYGEN SATURATION: 94 % | WEIGHT: 196 LBS | HEART RATE: 55 BPM | BODY MASS INDEX: 30.76 KG/M2 | RESPIRATION RATE: 18 BRPM | DIASTOLIC BLOOD PRESSURE: 84 MMHG | HEIGHT: 67 IN

## 2022-11-28 DIAGNOSIS — M43.6 TORTICOLLIS, ACUTE: Primary | ICD-10-CM
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
## 2022-11-28 DIAGNOSIS — F07.81 POST CONCUSSION SYNDROME: ICD-10-CM

## 2022-11-28 PROCEDURE — 99214 OFFICE O/P EST MOD 30 MIN: CPT | Performed by: NURSE PRACTITIONER

## 2022-11-28 RX ORDER — PREDNISONE 20 MG/1
20 TABLET ORAL 2 TIMES DAILY
Qty: 10 TABLET | Refills: 0 | Status: SHIPPED | OUTPATIENT
Start: 2022-11-28 | End: 2022-12-03

## 2022-11-28 RX ORDER — TIZANIDINE 4 MG/1
4 TABLET ORAL EVERY 8 HOURS PRN
Qty: 30 TABLET | Refills: 0 | Status: SHIPPED | OUTPATIENT
Start: 2022-11-28

## 2022-11-28 RX ORDER — TRAMADOL HYDROCHLORIDE 50 MG/1
50 TABLET ORAL EVERY 6 HOURS PRN
Qty: 12 TABLET | Refills: 0 | Status: SHIPPED | OUTPATIENT
Start: 2022-11-28 | End: 2022-12-01

## 2022-11-28 ASSESSMENT — ENCOUNTER SYMPTOMS
NAUSEA: 1
SHORTNESS OF BREATH: 0
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
## 2022-12-02 ENCOUNTER — PATIENT MESSAGE (OUTPATIENT)
Dept: PRIMARY CARE CLINIC | Age: 52
End: 2022-12-02

## 2022-12-02 DIAGNOSIS — M43.6 TORTICOLLIS, ACUTE: Primary | ICD-10-CM

## 2022-12-02 RX ORDER — METHYLPREDNISOLONE 4 MG/1
TABLET ORAL
Qty: 21 TABLET | Refills: 0 | Status: SHIPPED | OUTPATIENT
Start: 2022-12-02 | End: 2022-12-08

## 2022-12-02 RX ORDER — HYDROCODONE BITARTRATE AND ACETAMINOPHEN 5; 325 MG/1; MG/1
1 TABLET ORAL EVERY 6 HOURS PRN
Qty: 12 TABLET | Refills: 0 | Status: SHIPPED | OUTPATIENT
Start: 2022-12-02 | End: 2022-12-05
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
## 2022-12-02 NOTE — TELEPHONE ENCOUNTER
Writer called and spoke to patient about new medications. Advised to come into urgent care or ER for any worsening pain to be re-evaluated.    Patient verbalized understanding

## 2023-01-04 ENCOUNTER — OFFICE VISIT (OUTPATIENT)
Dept: FAMILY MEDICINE CLINIC | Age: 53
End: 2023-01-04
Payer: COMMERCIAL

## 2023-01-04 VITALS
SYSTOLIC BLOOD PRESSURE: 124 MMHG | HEIGHT: 67 IN | HEART RATE: 63 BPM | WEIGHT: 192 LBS | DIASTOLIC BLOOD PRESSURE: 66 MMHG | OXYGEN SATURATION: 94 % | BODY MASS INDEX: 30.13 KG/M2 | RESPIRATION RATE: 16 BRPM

## 2023-01-04 DIAGNOSIS — M54.2 NECK PAIN ON LEFT SIDE: ICD-10-CM

## 2023-01-04 DIAGNOSIS — M54.81 CERVICO-OCCIPITAL NEURALGIA OF LEFT SIDE: ICD-10-CM
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
## 2023-01-04 PROCEDURE — 99214 OFFICE O/P EST MOD 30 MIN: CPT | Performed by: NURSE PRACTITIONER

## 2023-01-04 ASSESSMENT — PATIENT HEALTH QUESTIONNAIRE - PHQ9
SUM OF ALL RESPONSES TO PHQ QUESTIONS 1-9: 0
SUM OF ALL RESPONSES TO PHQ QUESTIONS 1-9: 0
1. LITTLE INTEREST OR PLEASURE IN DOING THINGS: 0
SUM OF ALL RESPONSES TO PHQ9 QUESTIONS 1 & 2: 0
2. FEELING DOWN, DEPRESSED OR HOPELESS: 0
SUM OF ALL RESPONSES TO PHQ QUESTIONS 1-9: 0
SUM OF ALL RESPONSES TO PHQ QUESTIONS 1-9: 0

## 2023-01-04 ASSESSMENT — ENCOUNTER SYMPTOMS
SHORTNESS OF BREATH: 0
NAUSEA: 0
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
## 2023-01-05 ENCOUNTER — TELEPHONE (OUTPATIENT)
Dept: FAMILY MEDICINE CLINIC | Age: 53
End: 2023-01-05

## 2023-01-05 NOTE — TELEPHONE ENCOUNTER
[facility] regarding MRI. Patient has to have MRI done in [place] due to her pacemaker. Radiology number is [phone]. Calling to ask patient what day or time works best if she wants our office to schedule her appointment or if she would like to call and schedule herself.

## 2023-01-06 NOTE — TELEPHONE ENCOUNTER
Spoke with patient regarding MRI. She is going to call radiology and schedule her MRI herself so she can work around her schedule. Telephone number was given to patient that was provided by [facility] radiology scheduling. [phone]    Patient is to call back with any questions or concerns regarding scheduling.

## 2023-01-11 ENCOUNTER — PATIENT MESSAGE (OUTPATIENT)
Dept: FAMILY MEDICINE CLINIC | Age: 53
End: 2023-01-11

## 2023-01-11 NOTE — TELEPHONE ENCOUNTER
From: Gretchen Chamorro  To: Saray Hameed  Sent: 1/11/2023 9:13 AM EST  Subject: MRI    Molly-I am still in the process of scheduling the MRI. They had to contact my cardiologist first. When I was speaking with them, they said is was a cervical spine MRI. I just want to confirm whether that includes the shoulder blade/shoulder area just to make sure nothing is going on there. I'm not versed on these things. :) Thanks!

## 2023-01-12 DIAGNOSIS — F40.240 CLAUSTROPHOBIA: Primary | ICD-10-CM

## 2023-01-12 RX ORDER — LORAZEPAM 0.5 MG/1
TABLET ORAL
Qty: 1 TABLET | Refills: 0 | Status: SHIPPED | OUTPATIENT
Start: 2023-01-12 | End: 2023-01-12

## 2023-01-13 NOTE — TELEPHONE ENCOUNTER
Yaquelin Moody called requesting a refill of the below medication which has been pended for you:     Requested Prescriptions     Pending Prescriptions Disp Refills    sertraline (ZOLOFT) 25 MG tablet [Pharmacy Med Name: SERTRALINE HCL 25 MG TABLET] 30 tablet 3     Sig: TAKE ONE TABLET BY MOUTH DAILY       Last Appointment Date: 9/10/2022  Next Appointment Date: 2/13/2023    Allergies   Allergen Reactions    Doxycycline Calcium Nausea And Vomiting    Meloxicam Rash    Oxycodone-Acetaminophen Rash

## 2023-01-16 RX ORDER — SERTRALINE HYDROCHLORIDE 25 MG/1
TABLET, FILM COATED ORAL
Qty: 30 TABLET | Refills: 3 | Status: SHIPPED | OUTPATIENT
Start: 2023-01-16

## 2023-01-31 ENCOUNTER — HOSPITAL ENCOUNTER (OUTPATIENT)
Dept: INTERVENTIONAL RADIOLOGY/VASCULAR | Age: 53
Discharge: HOME | End: 2023-02-02
Payer: COMMERCIAL

## 2023-01-31 ENCOUNTER — HOSPITAL ENCOUNTER (OUTPATIENT)
Dept: MRI IMAGING | Age: 53
Discharge: HOME | End: 2023-02-02
Payer: COMMERCIAL

## 2023-01-31 DIAGNOSIS — M54.81 CERVICO-OCCIPITAL NEURALGIA OF LEFT SIDE: ICD-10-CM
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
## 2023-01-31 DIAGNOSIS — M54.2 NECK PAIN ON LEFT SIDE: ICD-10-CM

## 2023-01-31 PROCEDURE — 72141 MRI NECK SPINE W/O DYE: CPT
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
## 2023-02-03 DIAGNOSIS — M54.81 CERVICO-OCCIPITAL NEURALGIA OF LEFT SIDE: ICD-10-CM

## 2023-02-07 ENCOUNTER — HOSPITAL ENCOUNTER (OUTPATIENT)
Dept: PHYSICAL THERAPY | Age: 53
Setting detail: THERAPIES SERIES
Discharge: HOME | End: 2023-02-07
Payer: COMMERCIAL
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
## 2023-02-07 DIAGNOSIS — M54.81 CERVICO-OCCIPITAL NEURALGIA OF LEFT SIDE: ICD-10-CM

## 2023-02-07 PROCEDURE — 97110 THERAPEUTIC EXERCISES: CPT

## 2023-02-07 PROCEDURE — 97161 PT EVAL LOW COMPLEX 20 MIN: CPT

## 2023-02-07 ASSESSMENT — PAIN DESCRIPTION - ORIENTATION: ORIENTATION: LEFT

## 2023-02-07 ASSESSMENT — PAIN DESCRIPTION - PAIN TYPE: TYPE: CHRONIC PAIN

## 2023-02-07 ASSESSMENT — PAIN DESCRIPTION - DESCRIPTORS: DESCRIPTORS: SHARP;SHOOTING;ACHING

## 2023-02-07 ASSESSMENT — PAIN SCALES - GENERAL: PAINLEVEL_OUTOF10: 4

## 2023-02-07 ASSESSMENT — PAIN DESCRIPTION - LOCATION: LOCATION: NECK
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
## 2023-02-13 ENCOUNTER — HOSPITAL ENCOUNTER (OUTPATIENT)
Dept: PHYSICAL THERAPY | Age: 53
Setting detail: THERAPIES SERIES
Discharge: HOME | End: 2023-02-13
Payer: COMMERCIAL

## 2023-02-13 NOTE — PROGRESS NOTES
Physical Therapy  Outpatient Physical Therapy    [x] [place]  Phone: [phone]  Fax: [phone]      [] [place]  Phone: [phone]  Fax: [phone]    Physical Therapy  Cancellation/No-show Note  Patient Name:  Anne Lundy  :  1970   Date:  2023  Cancelled visits to date: 1  No-shows to date: 0    For today's appointment patient:  [x]  Cancelled  []  Rescheduled appointment  []  No-show     Reason given by patient:  []  Patient ill  []  Conflicting appointment  []  No transportation    []  Conflict with work  []  No reason given  []  Other:     Comments:  at 330 pt came to check in window stating has to leave as has waited too long to be seen. This PTA and registration did not see pt and pt was not checked in and waiting in the correct waiting room. registration informed pt about checking in at window despite pt stating did. Apologized to pt and was rescheduled.     Electronically signed by: Jorje Thomason PTA

## 2023-02-15 ENCOUNTER — HOSPITAL ENCOUNTER (OUTPATIENT)
Dept: PHYSICAL THERAPY | Age: 53
Setting detail: THERAPIES SERIES
Discharge: HOME | End: 2023-02-15
Payer: COMMERCIAL

## 2023-02-15 ENCOUNTER — HOSPITAL ENCOUNTER (OUTPATIENT)
Age: 53
Discharge: HOME | End: 2023-02-15
Payer: COMMERCIAL

## 2023-02-15 DIAGNOSIS — Z00.00 HEALTHCARE MAINTENANCE: ICD-10-CM

## 2023-02-15 LAB
ABSOLUTE EOS #: 0.19 K/UL (ref 0–0.44)
ABSOLUTE IMMATURE GRANULOCYTE: 0.03 K/UL (ref 0–0.3)
ABSOLUTE LYMPH #: 1.71 K/UL (ref 1.1–3.7)
ABSOLUTE MONO #: 0.44 K/UL (ref 0.1–1.2)
ALBUMIN SERPL-MCNC: 4.1 G/DL (ref 3.5–5.2)
ALBUMIN/GLOBULIN RATIO: 1.7 (ref 1–2.5)
ALP SERPL-CCNC: 50 U/L (ref 35–104)
ALT SERPL-CCNC: 19 U/L (ref 5–33)
ANION GAP SERPL CALCULATED.3IONS-SCNC: 11 MMOL/L (ref 9–17)
AST SERPL-CCNC: 13 U/L
BASOPHILS # BLD: 0 % (ref 0–2)
BASOPHILS ABSOLUTE: <0.03 K/UL (ref 0–0.2)
BILIRUB SERPL-MCNC: 0.4 MG/DL (ref 0.3–1.2)
BUN SERPL-MCNC: 13 MG/DL (ref 6–20)
BUN/CREAT BLD: 14 (ref 9–20)
CALCIUM SERPL-MCNC: 9.4 MG/DL (ref 8.6–10.4)
CHLORIDE SERPL-SCNC: 102 MMOL/L (ref 98–107)
CHOLEST SERPL-MCNC: 223 MG/DL
CHOLESTEROL/HDL RATIO: 4.7
CO2 SERPL-SCNC: 27 MMOL/L (ref 20–31)
CREAT SERPL-MCNC: 0.92 MG/DL (ref 0.5–0.9)
EOSINOPHILS RELATIVE PERCENT: 3 % (ref 1–4)
GFR SERPL CREATININE-BSD FRML MDRD: >60 ML/MIN/1.73M2
GLUCOSE SERPL-MCNC: 83 MG/DL (ref 70–99)
HCT VFR BLD AUTO: 40.4 % (ref 36.3–47.1)
HDLC SERPL-MCNC: 47 MG/DL
HGB BLD-MCNC: 13.7 G/DL (ref 11.9–15.1)
IMMATURE GRANULOCYTES: 1 %
LDLC SERPL CALC-MCNC: 121 MG/DL (ref 0–130)
LYMPHOCYTES # BLD: 31 % (ref 24–43)
MCH RBC QN AUTO: 30.2 PG (ref 25.2–33.5)
MCHC RBC AUTO-ENTMCNC: 33.9 G/DL (ref 25.2–33.5)
MCV RBC AUTO: 89 FL (ref 82.6–102.9)
MONOCYTES # BLD: 8 % (ref 3–12)
NRBC AUTOMATED: 0 PER 100 WBC
PDW BLD-RTO: 12.8 % (ref 11.8–14.4)
PLATELET # BLD AUTO: 298 K/UL (ref 138–453)
PMV BLD AUTO: 10.6 FL (ref 8.1–13.5)
POTASSIUM SERPL-SCNC: 4.4 MMOL/L (ref 3.7–5.3)
PROT SERPL-MCNC: 6.5 G/DL (ref 6.4–8.3)
RBC # BLD: 4.54 M/UL (ref 3.95–5.11)
SEG NEUTROPHILS: 57 % (ref 36–65)
SEGMENTED NEUTROPHILS ABSOLUTE COUNT: 3.19 K/UL (ref 1.5–8.1)
SODIUM SERPL-SCNC: 140 MMOL/L (ref 135–144)
TRIGL SERPL-MCNC: 274 MG/DL
WBC # BLD AUTO: 5.6 K/UL (ref 3.5–11.3)

## 2023-02-15 PROCEDURE — 80061 LIPID PANEL: CPT

## 2023-02-15 PROCEDURE — 97110 THERAPEUTIC EXERCISES: CPT | Performed by: PHYSICAL THERAPIST

## 2023-02-15 PROCEDURE — 36415 COLL VENOUS BLD VENIPUNCTURE: CPT

## 2023-02-15 PROCEDURE — 85025 COMPLETE CBC W/AUTO DIFF WBC: CPT

## 2023-02-15 PROCEDURE — 80053 COMPREHEN METABOLIC PANEL: CPT
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
## 2023-02-16 ENCOUNTER — OFFICE VISIT (OUTPATIENT)
Dept: FAMILY MEDICINE CLINIC | Age: 53
End: 2023-02-16
Payer: COMMERCIAL

## 2023-02-16 VITALS
WEIGHT: 191 LBS | DIASTOLIC BLOOD PRESSURE: 72 MMHG | SYSTOLIC BLOOD PRESSURE: 126 MMHG | HEART RATE: 72 BPM | BODY MASS INDEX: 29.98 KG/M2 | HEIGHT: 67 IN

## 2023-02-16 DIAGNOSIS — I82.431 ACUTE DEEP VEIN THROMBOSIS (DVT) OF POPLITEAL VEIN OF RIGHT LOWER EXTREMITY (HCC): ICD-10-CM

## 2023-02-16 DIAGNOSIS — M54.81 CERVICO-OCCIPITAL NEURALGIA OF LEFT SIDE: ICD-10-CM

## 2023-02-16 DIAGNOSIS — N95.1 MENOPAUSAL SYMPTOMS: ICD-10-CM

## 2023-02-16 DIAGNOSIS — K58.2 IRRITABLE BOWEL SYNDROME WITH BOTH CONSTIPATION AND DIARRHEA: ICD-10-CM

## 2023-02-16 DIAGNOSIS — K21.9 GASTROESOPHAGEAL REFLUX DISEASE WITHOUT ESOPHAGITIS: ICD-10-CM

## 2023-02-16 DIAGNOSIS — M54.2 NECK PAIN ON LEFT SIDE: ICD-10-CM

## 2023-02-16 DIAGNOSIS — I47.29 VENTRICULAR TACHYCARDIA, INDUCIBLE: ICD-10-CM

## 2023-02-16 DIAGNOSIS — F41.9 ANXIETY: Primary | ICD-10-CM

## 2023-02-16 PROCEDURE — 99214 OFFICE O/P EST MOD 30 MIN: CPT | Performed by: FAMILY MEDICINE

## 2023-02-16 RX ORDER — CLOTRIMAZOLE AND BETAMETHASONE DIPROPIONATE 10; .64 MG/G; MG/G
CREAM TOPICAL
Qty: 45 G | Refills: 1 | Status: SHIPPED | OUTPATIENT
Start: 2023-02-16

## 2023-02-16 SDOH — ECONOMIC STABILITY: FOOD INSECURITY: WITHIN THE PAST 12 MONTHS, THE FOOD YOU BOUGHT JUST DIDN'T LAST AND YOU DIDN'T HAVE MONEY TO GET MORE.: NEVER TRUE

## 2023-02-16 SDOH — ECONOMIC STABILITY: INCOME INSECURITY: HOW HARD IS IT FOR YOU TO PAY FOR THE VERY BASICS LIKE FOOD, HOUSING, MEDICAL CARE, AND HEATING?: NOT HARD AT ALL

## 2023-02-16 SDOH — ECONOMIC STABILITY: FOOD INSECURITY: WITHIN THE PAST 12 MONTHS, YOU WORRIED THAT YOUR FOOD WOULD RUN OUT BEFORE YOU GOT MONEY TO BUY MORE.: NEVER TRUE

## 2023-02-16 SDOH — ECONOMIC STABILITY: HOUSING INSECURITY
IN THE LAST 12 MONTHS, WAS THERE A TIME WHEN YOU DID NOT HAVE A STEADY PLACE TO SLEEP OR SLEPT IN A SHELTER (INCLUDING NOW)?: NO

## 2023-02-16 ASSESSMENT — ENCOUNTER SYMPTOMS
VOMITING: 0
EYES NEGATIVE: 1
NAUSEA: 0
DIARRHEA: 1
CONSTIPATION: 1
ANAL BLEEDING: 0
ABDOMINAL PAIN: 0
RESPIRATORY NEGATIVE: 1
ALLERGIC/IMMUNOLOGIC NEGATIVE: 1

## 2023-02-16 NOTE — PROGRESS NOTES
Subjective:      Patient ID: Anne Lundy is a 46 y.o. female. HPI   Routine follow up on chronic medical conditions. Gaining wt. Over the last year. 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 She reports her cardiologist does not want her to exercise due to \" Arythmogenic right ventricular cardiomyopathy\"    Menopause ongoing. Decreased libido. ibs symptoms intermittent/ongoing. Some food triggers (salad). Alternating constipation and diarrhea. More generalized abdominal aches. History of  dvt, but no residual leg swelling. icd placed for monomorphic VT. No discharges. gerd variable despite daily omeprazole. Anxiety a little worse with new job. Some stressors and more travel involved. Still adjusting to the new job. Had an [address] on 11/23/22 and had some neck pain afterward. Treated and released from ED same day. CT head and spine, xray right knee, chest, left shoulder. All unremarkable. Still having pain more on the left side radiating into the arm. Some spasm suspected. Tried prednisone and at least 2 muscle relaxer's. No perceived benefit. Tramadol for pain, also without perceived improvement. Had a second burst of steroid, and given norco, which helped some. She didn't want narcotics repetitively and switched to voltaren, without much efficacy perceived. MRI ordered. Impression   1. Reversal of the normal cervical lordosis which may be secondary to patient   positioning or muscle spasm. 2. Otherwise, unremarkable MRI of the cervical spine. Past Medical History:   Diagnosis Date    Anxiety     DVT of popliteal vein ([facility] [address] 08/2018    right leg, following knee surgery    Reflux     Ventricular tachycardia, inducible 01/18/2019    syncope->abnormal ekg/ischemia?->normal cath. ->ep with sustained/non sustained/inducible monomorphic VT [facility]     Past Surgical History:   Procedure Laterality Date    CARDIAC DEFIBRILLATOR PLACEMENT Left 01/2019    [facility].  sustained VT, non sustained/inducible VT on EP study. CHOLECYSTECTOMY  12/23/2010    COLONOSCOPY  06/04/2018    [facility] hosp. [place], single polyp cecum, diverticuli , follow up 5 years. COLONOSCOPY N/A 6/10/2020    *COLONOSCOPY performed by Flaquita Wagner DO at   [address]  ~2000    Dr. Mumtaz Mcnulty   fibroid/bleeding    KNEE SURGERY  11/14/2016    KNEE SURGERY Right 07/02/2018    rt knee scope, scar debridement, MMD, patellofemoral ligament repair, microfracture of medial femoral condyle    LAPAROSCOPY  09/12/2012    Exploratory; lysis of adhesions. UPPER GASTROINTESTINAL ENDOSCOPY  07/23/2012    Grade 2 esophagitis. UPPER GASTROINTESTINAL ENDOSCOPY  12/17/2010    small hiatal hernia    UPPER GASTROINTESTINAL ENDOSCOPY N/A 6/10/2020    EGD BIOPSY performed by Flaquita Wagner DO at [facility] OR     Current Outpatient Medications   Medication Sig Dispense Refill    sertraline (ZOLOFT) 25 MG tablet TAKE ONE TABLET BY MOUTH DAILY 30 tablet 3    tiZANidine (ZANAFLEX) 4 MG tablet Take 1 tablet by mouth every 8 hours as needed (neck pain) 30 tablet 0    omeprazole (PRILOSEC) 40 MG delayed release capsule       fluticasone (FLONASE) 50 MCG/ACT nasal spray 2 sprays by Nasal route in the morning. 1 each 3    metoprolol succinate (TOPROL XL) 25 MG extended release tablet Take 25 mg by mouth daily      Alum Hydroxide-Mag Carbonate (GAVISCON PO) Take 2 tablets by mouth nightly. No current facility-administered medications for this visit. Review of Systems   Constitutional:  Positive for unexpected weight change (up). HENT: Negative. Eyes: Negative. Respiratory: Negative. Cardiovascular: Negative. Gastrointestinal:  Positive for constipation and diarrhea. Negative for abdominal pain, anal bleeding, nausea and vomiting. Endocrine: Negative. Genitourinary: Negative.     Musculoskeletal: Negative. Skin: Negative. Allergic/Immunologic: Negative. Neurological: Negative. Hematological: Negative. Psychiatric/Behavioral: Negative. Objective:   Physical Exam  Constitutional:       General: She is not in acute distress. Appearance: She is well-developed. HENT:      Head: Normocephalic and atraumatic. Right Ear: External ear normal.      Left Ear: External ear normal.      Mouth/Throat:      Pharynx: No oropharyngeal exudate. Eyes:      General: No scleral icterus. Conjunctiva/sclera: Conjunctivae normal.   Neck:      Thyroid: No thyromegaly. Cardiovascular:      Rate and Rhythm: Normal rate and regular rhythm. Heart sounds: Normal heart sounds. No murmur heard. Pulmonary:      Effort: Pulmonary effort is normal. No respiratory distress. Breath sounds: Normal breath sounds. No wheezing. Abdominal:      General: Bowel sounds are normal. There is no distension. Palpations: Abdomen is soft. Tenderness: There is no abdominal tenderness. There is no rebound. Musculoskeletal:         General: No tenderness. Normal range of motion. Cervical back: Neck supple. Skin:     General: Skin is warm and dry. Findings: No erythema or rash. Neurological:      Mental Status: She is alert and oriented to person, place, and time. Psychiatric:         Behavior: Behavior normal.         Thought Content:  Thought content normal.         Judgment: Judgment normal.     /72 (Site: Right Upper Arm, Position: Sitting, Cuff Size: Large Adult)   Pulse 72   Ht 5' 7\" (1.702 m)   Wt 191 lb (86.6 kg)   BMI 29.91 kg/m²     Hospital Outpatient Visit on 02/15/2023   Component Date Value Ref Range Status    Cholesterol 02/15/2023 223 (A)  <200 mg/dL Final    HDL 02/15/2023 47  >40 mg/dL Final    LDL Cholesterol 02/15/2023 121  0 - 130 mg/dL Final    Chol/HDL Ratio 02/15/2023 4.7  <5 Final    Triglycerides 02/15/2023 274 (A)  <150 mg/dL Final    Glucose 02/15/2023 83  70 - 99 mg/dL Final    BUN 02/15/2023 13  6 - 20 mg/dL Final    Creatinine 02/15/2023 0.92 (A)  0.50 - 0.90 mg/dL Final    Est, Glom Filt Rate 02/15/2023 >60  >60 mL/min/1.73m2 Final    Bun/Cre Ratio 02/15/2023 14  9 - 20 Final    Calcium 02/15/2023 9.4  8.6 - 10.4 mg/dL Final    Sodium 02/15/2023 140  135 - 144 mmol/L Final    Potassium 02/15/2023 4.4  3.7 - 5.3 mmol/L Final    Chloride 02/15/2023 102  98 - 107 mmol/L Final    CO2 02/15/2023 27  20 - 31 mmol/L Final    Anion Gap 02/15/2023 11  9 - 17 mmol/L Final    Alkaline Phosphatase 02/15/2023 50  35 - 104 U/L Final    ALT 02/15/2023 19  5 - 33 U/L Final    AST 02/15/2023 13  <32 U/L Final    Total Bilirubin 02/15/2023 0.4  0.3 - 1.2 mg/dL Final    Total Protein 02/15/2023 6.5  6.4 - 8.3 g/dL Final    Albumin 02/15/2023 4.1  3.5 - 5.2 g/dL Final    Albumin/Globulin Ratio 02/15/2023 1.7  1.0 - 2.5 Final    WBC 02/15/2023 5.6  3.5 - 11.3 k/uL Final    RBC 02/15/2023 4.54  3.95 - 5.11 m/uL Final    Hemoglobin 02/15/2023 13.7  11.9 - 15.1 g/dL Final    Hematocrit 02/15/2023 40.4  36.3 - 47.1 % Final    MCV 02/15/2023 89.0  82.6 - 102.9 fL Final    MCH 02/15/2023 30.2  25.2 - 33.5 pg Final    MCHC 02/15/2023 33.9 (A)  25.2 - 33.5 g/dL Final    RDW 02/15/2023 12.8  11.8 - 14.4 % Final    Platelets 54/87/6320 298  138 - 453 k/uL Final    MPV 02/15/2023 10.6  8.1 - 13.5 fL Final    NRBC Automated 02/15/2023 0.0  0.0 per 100 WBC Final    Seg Neutrophils 02/15/2023 57  36 - 65 % Final    Lymphocytes 02/15/2023 31  24 - 43 % Final    Monocytes 02/15/2023 8  3 - 12 % Final    Eosinophils % 02/15/2023 3  1 - 4 % Final    Basophils 02/15/2023 0  0 - 2 % Final    Immature Granulocytes 02/15/2023 1 (A)  0 % Final    Segs Absolute 02/15/2023 3.19  1.50 - 8.10 k/uL Final    Absolute Lymph # 02/15/2023 1.71  1.10 - 3.70 k/uL Final    Absolute Mono # 02/15/2023 0.44  0.10 - 1.20 k/uL Final    Absolute Eos # 02/15/2023 0.19  0.00 - 0.44 k/uL Final    Basophils Absolute 02/15/2023 <0.03  0.00 - 0.20 k/uL Final    Absolute Immature Granulocyte 02/15/2023 0.03  0.00 - 0.30 k/uL Final         Assessment:      Encounter Diagnoses   Name Primary? Anxiety Yes    Gastroesophageal reflux disease without esophagitis     Ventricular tachycardia, inducible     Acute deep vein thrombosis (DVT) of popliteal vein of right lower extremity (HCC)     Irritable bowel syndrome with both constipation and diarrhea     Menopausal symptoms     Neck pain on left side     Cervico-occipital neuralgia of left side                Plan:      Updating annual mammogram 21: negative    Anxiety: fair. Using alprazolam just prn at present. New job with some increased stressors. Needing refilling . Still using prn /overwhelmed. Brother  of suicide. Mom and dtr/boyfriend moved into her home. She continues on zoloft. Gerd: trying to eat better. Intermittent perceived symptoms, reflux during the night up into the throat. Cont. Omeprazole and use tums or gaviscon . Reviewed lifestyle changes. VT:s/p icd. No discharges. On toprol. Cardiology following. Cont. Current toprol dose and cardiology follow up. Negative genetic testing. EP cardiologist diagnosed \"arrhythmogenic right ventricular cardiomyopathy. \"      dvt following knee surgery right leg. Asx, resolved. Off anticoagulation at present. No recurrent concerns. Avoiding prolonged sitting. Ibs. Alternating . Consider fiber supplement bid. Stable overall      Declines flu vaccine. Colonoscopy 6/10/2020 with mild sigmoid diverticulosis, int/ext hemorrhoids. No polyps. 10 yr follow up. Seb cyst removed from left posterior shoulder. Scarred in and hard to remove. Some scar tissues a little larger. Medial end a little more palpable. Cant rule recurrent cyst at this size. Observation. Menopausal: s/p ablation and no menses in 12 yrs. Now with hot flashes and roller coaster emotions. Rec. Calcium vit d 2 /day. Gluteal cleft rash just on the left. Chronic .   Plan trial of lotrisone cream.         Efrain Cosme MD

## 2023-02-17 ENCOUNTER — HOSPITAL ENCOUNTER (OUTPATIENT)
Dept: PHYSICAL THERAPY | Age: 53
Setting detail: THERAPIES SERIES
Discharge: HOME | End: 2023-02-17
Payer: COMMERCIAL

## 2023-02-17 PROCEDURE — 97140 MANUAL THERAPY 1/> REGIONS: CPT

## 2023-02-17 PROCEDURE — 97012 MECHANICAL TRACTION THERAPY: CPT

## 2023-02-17 PROCEDURE — 97110 THERAPEUTIC EXERCISES: CPT
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
## 2023-02-20 ENCOUNTER — OFFICE VISIT (OUTPATIENT)
Dept: PRIMARY CARE CLINIC | Age: 53
End: 2023-02-20
Payer: COMMERCIAL

## 2023-02-20 VITALS
BODY MASS INDEX: 29.69 KG/M2 | TEMPERATURE: 97.7 F | WEIGHT: 189.13 LBS | DIASTOLIC BLOOD PRESSURE: 58 MMHG | SYSTOLIC BLOOD PRESSURE: 94 MMHG | HEIGHT: 67 IN | HEART RATE: 83 BPM | RESPIRATION RATE: 18 BRPM | OXYGEN SATURATION: 98 %

## 2023-02-20 DIAGNOSIS — R09.82 POST-NASAL DRIP: ICD-10-CM

## 2023-02-20 DIAGNOSIS — B96.89 ACUTE BACTERIAL SINUSITIS: ICD-10-CM

## 2023-02-20 DIAGNOSIS — H66.002 ACUTE SUPPURATIVE OTITIS MEDIA OF LEFT EAR WITHOUT SPONTANEOUS RUPTURE OF TYMPANIC MEMBRANE, RECURRENCE NOT SPECIFIED: Primary | ICD-10-CM

## 2023-02-20 DIAGNOSIS — J01.90 ACUTE BACTERIAL SINUSITIS: ICD-10-CM

## 2023-02-20 PROCEDURE — 99213 OFFICE O/P EST LOW 20 MIN: CPT | Performed by: NURSE PRACTITIONER

## 2023-02-20 RX ORDER — AMOXICILLIN AND CLAVULANATE POTASSIUM 875; 125 MG/1; MG/1
1 TABLET, FILM COATED ORAL 2 TIMES DAILY
Qty: 20 TABLET | Refills: 0 | Status: SHIPPED | OUTPATIENT
Start: 2023-02-20 | End: 2023-03-02

## 2023-02-20 RX ORDER — TRIAMCINOLONE ACETONIDE 55 UG/1
2 SPRAY, METERED NASAL DAILY
Qty: 1 EACH | Refills: 0 | Status: SHIPPED | OUTPATIENT
Start: 2023-02-20

## 2023-02-20 RX ORDER — PREDNISONE 20 MG/1
20 TABLET ORAL 2 TIMES DAILY
Qty: 10 TABLET | Refills: 0 | Status: SHIPPED | OUTPATIENT
Start: 2023-02-20 | End: 2023-02-25

## 2023-02-20 ASSESSMENT — ENCOUNTER SYMPTOMS
GASTROINTESTINAL NEGATIVE: 1
COUGH: 0
SINUS PAIN: 1
RHINORRHEA: 1

## 2023-02-20 NOTE — PROGRESS NOTES
[address] [place] [address] Urgent Care A department of [facility]  Phone: [phone]  Fax: [phone]      Callie Phelan is a 46 y.o. female who presents to the [facility] or [address] [place] [address] today for her medical conditions/complaints as noted below. Callie Phelan is c/o of Congestion (Started Saturday night, sneezing, sinus pain/pressure, headache, left ear pain. Denies cough, body aches, chills, and fever.)      Assessment and Plan     1. Acute suppurative otitis media of left ear without spontaneous rupture of tympanic membrane, recurrence not specified  - triamcinolone (NASACORT) 55 MCG/ACT nasal inhaler; 2 sprays by Each Nostril route daily  Dispense: 1 each; Refill: 0  - amoxicillin-clavulanate (AUGMENTIN) 875-125 MG per tablet; Take 1 tablet by mouth 2 times daily for 10 days  Dispense: 20 tablet; Refill: 0  - predniSONE (DELTASONE) 20 MG tablet; Take 1 tablet by mouth 2 times daily for 5 days  Dispense: 10 tablet; Refill: 0    2. Acute bacterial sinusitis  - triamcinolone (NASACORT) 55 MCG/ACT nasal inhaler; 2 sprays by Each Nostril route daily  Dispense: 1 each; Refill: 0  - amoxicillin-clavulanate (AUGMENTIN) 875-125 MG per tablet; Take 1 tablet by mouth 2 times daily for 10 days  Dispense: 20 tablet; Refill: 0  - predniSONE (DELTASONE) 20 MG tablet; Take 1 tablet by mouth 2 times daily for 5 days  Dispense: 10 tablet; Refill: 0    3. Post-nasal drip  - triamcinolone (NASACORT) 55 MCG/ACT nasal inhaler; 2 sprays by Each Nostril route daily  Dispense: 1 each; Refill: 0      Recommended over the counter medications/treatments: The use of an antihistamine (Claritin or Zyrtec) and a nasal steroid spray (Flonase or Nasacort) may help with sinus congestion and drainage. Mucinex will also help thin secretions and improve congestion. Works best if drinking plenty of water. Honey with or without Lemon may also help with coughing.   Probiotics daily may help boost immune system. Alternating hot liquids with cold liquids helps to sooth sore throat  Use Acetaminophen (Tylenol) to help relieve fever, chills or body aches. If allowed, you may alternate using ibuprofen (Motrin) and Tylenol. Do not exceed 3,000mg of Tylenol in a 24 hour time period. Make sure to stay well hydrated by drinking plenty of water. Follow up with primary care provider in 1 to 2 days or as needed if no improvement or worsening of your symptoms. Subjective:   URI   This is a new problem. The current episode started in the past 7 days (2/18/2023). The problem has been gradually worsening. There has been no fever (100.1). Associated symptoms include congestion, headaches, a plugged ear sensation (left ear), rhinorrhea (yellow) and sinus pain. Pertinent negatives include no coughing. Treatments tried: cloricedin cold and flu, flonase. The treatment provided mild relief. Review of Systems   Constitutional:  Positive for fatigue and fever (100.1). Negative for appetite change. HENT:  Positive for congestion, rhinorrhea (yellow) and sinus pain. Respiratory:  Negative for cough. Gastrointestinal: Negative. Genitourinary: Negative. Skin: Negative. Neurological:  Positive for headaches. All other systems reviewed and are negative. Past Medical History:   Past Medical History:   Diagnosis Date    Anxiety     DVT of popliteal vein ([facility] [address] 08/2018    right leg, following knee surgery    Reflux     Ventricular tachycardia, inducible 01/18/2019    syncope->abnormal ekg/ischemia?->normal cath. ->ep with sustained/non sustained/inducible monomorphic VT [facility]        Past Surgical History:  has a past surgical history that includes Dilation and curettage of uterus ([address] [facility] [address]); laparoscopy (09/12/2012); Upper gastrointestinal endoscopy (07/23/2012); Cholecystectomy (12/23/2010);  Upper gastrointestinal endoscopy (12/17/2010); knee surgery (11/14/2016); knee surgery (Right, 07/02/2018); Endometrial ablation (~2000); Colonoscopy (06/04/2018); Cardiac defibrillator placement (Left, 01/2019); Upper gastrointestinal endoscopy (N/A, 6/10/2020); and Colonoscopy (N/A, 6/10/2020). Allergies: Allergies   Allergen Reactions    Doxycycline Calcium Nausea And Vomiting    Meloxicam Rash    Oxycodone-Acetaminophen Rash       Social History:  reports that she quit smoking about 4 years ago. Her smoking use included cigarettes. She started smoking about 38 years ago. She has a 25.00 pack-year smoking history. She has never used smokeless tobacco. She reports current alcohol use. She reports that she does not use drugs. Objective:     Vitals:    02/20/23 1052   BP: (!) 94/58   Site: Right Upper Arm   Position: Sitting   Cuff Size: Medium Adult   Pulse: 83   Resp: 18   Temp: 97.7 °F (36.5 °C)   TempSrc: Tympanic   SpO2: 98%   Weight: 189 lb 2 oz (85.8 kg)   Height: 5' 7\" (1.702 m)     Body mass index is 29.62 kg/m². BP (!) 94/58 (Site: Right Upper Arm, Position: Sitting, Cuff Size: Medium Adult)   Pulse 83   Temp 97.7 °F (36.5 °C) (Tympanic)   Resp 18   Ht 5' 7\" (1.702 m)   Wt 189 lb 2 oz (85.8 kg)   SpO2 98%   BMI 29.62 kg/m²   Physical Exam  Vitals and nursing note reviewed. Constitutional:       General: She is not in acute distress. Appearance: She is ill-appearing. HENT:      Right Ear: Hearing, ear canal and external ear normal. There is no impacted cerumen. Tympanic membrane is injected and erythematous. Tympanic membrane is not bulging. Left Ear: Hearing, tympanic membrane, ear canal and external ear normal. There is no impacted cerumen. Nose: Mucosal edema, congestion and rhinorrhea present. Rhinorrhea is purulent. Right Turbinates: Swollen. Left Turbinates: Swollen. Right Sinus: Frontal sinus tenderness present. Left Sinus: Frontal sinus tenderness present. Mouth/Throat:      Lips: Pink.       Mouth: Mucous membranes are moist.      Pharynx: Uvula midline. Posterior oropharyngeal erythema present. Tonsils: No tonsillar abscesses. Comments: Moderate amount of mucus noted in the pharynx. Eyes:      Conjunctiva/sclera: Conjunctivae normal.      Pupils: Pupils are equal, round, and reactive to light. Cardiovascular:      Rate and Rhythm: Normal rate and regular rhythm. Pulses: Normal pulses. Heart sounds: Normal heart sounds. Pulmonary:      Effort: Pulmonary effort is normal. No respiratory distress. Breath sounds: Normal breath sounds. No decreased air movement. No decreased breath sounds, wheezing, rhonchi or rales. Musculoskeletal:         General: Normal range of motion. Cervical back: Normal range of motion. Lymphadenopathy:      Cervical: No cervical adenopathy. Right cervical: No superficial or posterior cervical adenopathy. Left cervical: No superficial or posterior cervical adenopathy. Upper Body:      Right upper body: No supraclavicular adenopathy. Left upper body: No supraclavicular adenopathy. Skin:     General: Skin is warm and dry. Capillary Refill: Capillary refill takes less than 2 seconds. Neurological:      General: No focal deficit present. Mental Status: She is alert and oriented to person, place, and time. Mental status is at baseline. Psychiatric:         Mood and Affect: Mood normal.         Behavior: Behavior normal.         Discussed exam, POCT findings, plan of care, and follow-up at length with patient and/or their caregiver. Reviewed all prescribed and recommended medications, administration and side effects. Encouraged patient to follow up with PCP or return to the clinic for no improvement and or worsening of symptoms. All questions were addressed and answered with verbalization of understanding. The patient and/or the caregiver was agreeable with the plan.      Electronically signed by SHERRI Hussein CNP on 2/20/2023 at 2:27 PM

## 2023-02-22 ENCOUNTER — HOSPITAL ENCOUNTER (OUTPATIENT)
Dept: PHYSICAL THERAPY | Age: 53
Setting detail: THERAPIES SERIES
Discharge: HOME | End: 2023-02-22
Payer: COMMERCIAL

## 2023-02-22 PROCEDURE — 97110 THERAPEUTIC EXERCISES: CPT

## 2023-02-22 PROCEDURE — 97140 MANUAL THERAPY 1/> REGIONS: CPT

## 2023-02-22 PROCEDURE — 97012 MECHANICAL TRACTION THERAPY: CPT
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
## 2023-02-24 ENCOUNTER — HOSPITAL ENCOUNTER (OUTPATIENT)
Dept: PHYSICAL THERAPY | Age: 53
Setting detail: THERAPIES SERIES
Discharge: HOME | End: 2023-02-24
Payer: COMMERCIAL

## 2023-02-24 PROCEDURE — 97012 MECHANICAL TRACTION THERAPY: CPT

## 2023-02-24 PROCEDURE — 97110 THERAPEUTIC EXERCISES: CPT

## 2023-02-24 PROCEDURE — 20561 NDL INSJ W/O NJX 3+ MUSC: CPT
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
## 2023-02-28 ENCOUNTER — HOSPITAL ENCOUNTER (OUTPATIENT)
Dept: PHYSICAL THERAPY | Age: 53
Setting detail: THERAPIES SERIES
Discharge: HOME | End: 2023-02-28
Payer: COMMERCIAL

## 2023-02-28 PROCEDURE — 20561 NDL INSJ W/O NJX 3+ MUSC: CPT

## 2023-02-28 PROCEDURE — 97012 MECHANICAL TRACTION THERAPY: CPT

## 2023-02-28 PROCEDURE — 97140 MANUAL THERAPY 1/> REGIONS: CPT
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
## 2023-03-01 RX ORDER — FLUCONAZOLE 150 MG/1
150 TABLET ORAL
Qty: 2 TABLET | Refills: 0 | Status: SHIPPED | OUTPATIENT
Start: 2023-03-01 | End: 2023-03-07

## 2023-03-01 RX ORDER — MECLIZINE HCL 12.5 MG/1
12.5 TABLET ORAL 3 TIMES DAILY PRN
Qty: 30 TABLET | Refills: 1 | Status: SHIPPED | OUTPATIENT
Start: 2023-03-01 | End: 2023-03-11

## 2023-03-02 ENCOUNTER — HOSPITAL ENCOUNTER (OUTPATIENT)
Dept: PHYSICAL THERAPY | Age: 53
Setting detail: THERAPIES SERIES
Discharge: HOME | End: 2023-03-02
Payer: COMMERCIAL

## 2023-03-02 PROCEDURE — 97140 MANUAL THERAPY 1/> REGIONS: CPT

## 2023-03-02 PROCEDURE — 20561 NDL INSJ W/O NJX 3+ MUSC: CPT
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
## 2023-03-14 ENCOUNTER — HOSPITAL ENCOUNTER (OUTPATIENT)
Dept: PHYSICAL THERAPY | Age: 53
Setting detail: THERAPIES SERIES
Discharge: HOME | End: 2023-03-14
Payer: COMMERCIAL

## 2023-03-14 PROCEDURE — 20561 NDL INSJ W/O NJX 3+ MUSC: CPT

## 2023-03-14 PROCEDURE — 97140 MANUAL THERAPY 1/> REGIONS: CPT

## 2023-03-14 PROCEDURE — 97110 THERAPEUTIC EXERCISES: CPT

## 2023-03-14 PROCEDURE — 97012 MECHANICAL TRACTION THERAPY: CPT
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
## 2023-03-16 ENCOUNTER — HOSPITAL ENCOUNTER (OUTPATIENT)
Dept: PHYSICAL THERAPY | Age: 53
Setting detail: THERAPIES SERIES
Discharge: HOME | End: 2023-03-16
Payer: COMMERCIAL

## 2023-03-16 PROCEDURE — 97140 MANUAL THERAPY 1/> REGIONS: CPT

## 2023-03-16 PROCEDURE — 97110 THERAPEUTIC EXERCISES: CPT
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
## 2023-03-21 ENCOUNTER — OFFICE VISIT (OUTPATIENT)
Dept: FAMILY MEDICINE CLINIC | Age: 53
End: 2023-03-21
Payer: COMMERCIAL

## 2023-03-21 VITALS
HEIGHT: 67 IN | DIASTOLIC BLOOD PRESSURE: 70 MMHG | HEART RATE: 68 BPM | WEIGHT: 191 LBS | BODY MASS INDEX: 29.98 KG/M2 | SYSTOLIC BLOOD PRESSURE: 130 MMHG

## 2023-03-21 DIAGNOSIS — S13.9XXA NECK SPRAIN, INITIAL ENCOUNTER: Primary | ICD-10-CM

## 2023-03-21 PROCEDURE — 99213 OFFICE O/P EST LOW 20 MIN: CPT | Performed by: FAMILY MEDICINE

## 2023-03-21 RX ORDER — TIZANIDINE 4 MG/1
4 TABLET ORAL EVERY 8 HOURS PRN
Qty: 30 TABLET | Refills: 2 | Status: SHIPPED | OUTPATIENT
Start: 2023-03-21

## 2023-03-21 ASSESSMENT — ENCOUNTER SYMPTOMS
EYES NEGATIVE: 1
RESPIRATORY NEGATIVE: 1
GASTROINTESTINAL NEGATIVE: 1

## 2023-03-21 NOTE — PROGRESS NOTES
Alycia Nixon
thyromegaly. Cardiovascular:      Rate and Rhythm: Normal rate and regular rhythm. Heart sounds: Normal heart sounds. No murmur heard. Pulmonary:      Effort: Pulmonary effort is normal. No respiratory distress. Breath sounds: Normal breath sounds. No wheezing. Abdominal:      General: Bowel sounds are normal. There is no distension. Palpations: Abdomen is soft. Tenderness: There is no abdominal tenderness. There is no rebound. Musculoskeletal:         General: No tenderness. Cervical back: Neck supple. No rigidity or torticollis. Pain with movement and muscular tenderness present. No spinous process tenderness. Decreased range of motion (limited in rotation and flexion/extension). Skin:     General: Skin is warm and dry. Findings: No erythema or rash. Neurological:      Mental Status: She is alert and oriented to person, place, and time. Psychiatric:         Behavior: Behavior normal.         Thought Content: Thought content normal.         Judgment: Judgment normal.     /70 (Site: Right Upper Arm, Position: Sitting, Cuff Size: Large Adult)   Pulse 68   Ht 5' 7\" (1.702 m)   Wt 191 lb (86.6 kg)   BMI 29.91 kg/m²     Assessment:      Encounter Diagnosis   Name Primary? Neck sprain, initial encounter Yes     Prolonged , bilateral neck pain , fairly broad from occipital ridge down to T2 level. Mri/imaging fairly normal, without cord or nerve impingement. Mostly muscular pain and tension at present. Cant use tens unit due to defibrillator. Trying some dry needling at present. Plan:      Primarily muscle pain on exam.  Rec. Deep tissue/aggressive massage.    Recheck prn         Iggy Ballard MD

## 2023-03-22 ENCOUNTER — APPOINTMENT (OUTPATIENT)
Dept: PHYSICAL THERAPY | Age: 53
End: 2023-03-22
Payer: COMMERCIAL

## 2023-03-24 ENCOUNTER — HOSPITAL ENCOUNTER (OUTPATIENT)
Dept: PHYSICAL THERAPY | Age: 53
Setting detail: THERAPIES SERIES
Discharge: HOME | End: 2023-03-24
Payer: COMMERCIAL

## 2023-03-24 DIAGNOSIS — M54.81 CERVICO-OCCIPITAL NEURALGIA OF LEFT SIDE: Primary | ICD-10-CM

## 2023-03-24 PROCEDURE — 20561 NDL INSJ W/O NJX 3+ MUSC: CPT

## 2023-03-24 PROCEDURE — 97140 MANUAL THERAPY 1/> REGIONS: CPT

## 2023-03-24 PROCEDURE — 97012 MECHANICAL TRACTION THERAPY: CPT
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
## 2023-03-27 ENCOUNTER — PATIENT MESSAGE (OUTPATIENT)
Dept: FAMILY MEDICINE CLINIC | Age: 53
End: 2023-03-27

## 2023-03-27 DIAGNOSIS — M54.81 CERVICO-OCCIPITAL NEURALGIA OF LEFT SIDE: Primary | ICD-10-CM

## 2023-03-27 NOTE — TELEPHONE ENCOUNTER
From: Faye Sanchez  To: Brad Kaushik  Sent: 3/27/2023 3:31 PM EDT  Subject: Pain Management referral    Thank you for the referral for pain management. I would, however, like to be seen by Dr. Ana Flores in [facility]. Can you send the referral to him?    Michael Miles

## 2023-03-28 ENCOUNTER — HOSPITAL ENCOUNTER (OUTPATIENT)
Dept: PHYSICAL THERAPY | Age: 53
Setting detail: THERAPIES SERIES
Discharge: HOME | End: 2023-03-28
Payer: COMMERCIAL

## 2023-03-28 PROCEDURE — 97012 MECHANICAL TRACTION THERAPY: CPT

## 2023-03-28 PROCEDURE — 20561 NDL INSJ W/O NJX 3+ MUSC: CPT

## 2023-03-28 PROCEDURE — 97140 MANUAL THERAPY 1/> REGIONS: CPT
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
## 2023-03-30 ENCOUNTER — APPOINTMENT (OUTPATIENT)
Dept: PHYSICAL THERAPY | Age: 53
End: 2023-03-30
Payer: COMMERCIAL

## 2023-03-31 ENCOUNTER — HOSPITAL ENCOUNTER (OUTPATIENT)
Dept: PHYSICAL THERAPY | Age: 53
Setting detail: THERAPIES SERIES
Discharge: HOME | End: 2023-03-31
Payer: COMMERCIAL

## 2023-03-31 PROCEDURE — 97012 MECHANICAL TRACTION THERAPY: CPT

## 2023-03-31 PROCEDURE — 20561 NDL INSJ W/O NJX 3+ MUSC: CPT

## 2023-03-31 PROCEDURE — 97140 MANUAL THERAPY 1/> REGIONS: CPT

## 2023-03-31 NOTE — PROGRESS NOTES
Activities:     [] Therapeutic activities, direct (one-on-one) patient contact (use of dynamic activities to improve functional performance). (87085)    Gait:   [] Provided training and instruction to the patient for ambulation re-education. (34157)    Self-Care/ADL's  [] Self-care/home management training and compensatory training, meal preparation, safety procedures, and instructions in use of assistive technology devices/adaptive equipment, direct one-on-one contact. (28465)    Home Exercise Program:   C-spine extension directional prederence for post derangement  [x] Reviewed/Progressed HEP activities related to strengthening, flexibility, endurance, ROM. (12656)  [] Reviewed/Progressed HEP activities related to improving balance, coordination, kinesthetic sense, posture, motor skill, proprioception.  (44965)    Manual Treatments:    [x] Provided manual therapy to mobilize soft tissue/joints for the purpose of modulating pain, promoting relaxation,  increasing ROM, reducing/eliminating soft tissue swelling/inflammation/restriction, improving soft tissue extensibility. (83520)  -IDN performed this date by Kirby Uriarte PT to decrease pain, trigger points, tone, and spasm as well as increase tissue extensibility. Specific placement and dose can be seen on separately scanned image.      Service Based Modalities: Traction 15, 19# max 10# min, 12' IDN     Timed Code Treatment Minutes:  23man      Total Treatment Minutes: 48'    Treatment/Activity Tolerance:  [x] Patient tolerated treatment well [] Patient limited by fatique  [] Patient limited by pain  [] Patient limited by other medical complications  [] Other:     Prognosis: [x] Good [] Fair  [] Poor    Patient Requires Follow-up: [x] Yes  [] No      Goals:  Short Term Goals  Time Frame for Short Term Goals: 1 weeks  Short Term Goal 1: Provide written HEP    Long Term Goals  Time Frame for Long Term Goals : 4 weeks  Long Term Goal 1: Patient will report no greater than

## 2023-04-04 ENCOUNTER — HOSPITAL ENCOUNTER (OUTPATIENT)
Dept: OCCUPATIONAL THERAPY | Age: 53
Setting detail: THERAPIES SERIES
Discharge: HOME | End: 2023-04-04
Payer: COMMERCIAL

## 2023-04-04 ENCOUNTER — HOSPITAL ENCOUNTER (OUTPATIENT)
Dept: PHYSICAL THERAPY | Age: 53
Setting detail: THERAPIES SERIES
Discharge: HOME | End: 2023-04-04
Payer: COMMERCIAL

## 2023-04-04 DIAGNOSIS — R51.9 CHRONIC INTRACTABLE HEADACHE, UNSPECIFIED HEADACHE TYPE: ICD-10-CM

## 2023-04-04 DIAGNOSIS — M54.81 CERVICO-OCCIPITAL NEURALGIA OF LEFT SIDE: Primary | ICD-10-CM

## 2023-04-04 DIAGNOSIS — G89.29 CHRONIC INTRACTABLE HEADACHE, UNSPECIFIED HEADACHE TYPE: ICD-10-CM

## 2023-04-04 PROCEDURE — 97110 THERAPEUTIC EXERCISES: CPT

## 2023-04-04 PROCEDURE — 97140 MANUAL THERAPY 1/> REGIONS: CPT

## 2023-04-04 PROCEDURE — 97012 MECHANICAL TRACTION THERAPY: CPT

## 2023-04-04 PROCEDURE — 97140 MANUAL THERAPY 1/> REGIONS: CPT | Performed by: OCCUPATIONAL THERAPIST

## 2023-04-04 PROCEDURE — 97165 OT EVAL LOW COMPLEX 30 MIN: CPT | Performed by: OCCUPATIONAL THERAPIST
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
## 2023-04-04 NOTE — PROGRESS NOTES
than 36% disability on the TY to allow patient to complete her daily ADLs with greater ease. (Not met 38%)  Long Term Goal 3: Patient will demonstrate 4+/5 bilateral UE strength and  strength to within 10% of right side to allow patient to lift and carry items with greater ease. (Left hip flexion 4+/5, L abd 4-5, IR/ER 4+/5)   Long Term Goal 4: Patient will demonstrate bilateral cervical rotation to 55 degrees bilaterally to allow patient to safely drive her vehicle with less restriction.  (L 58, R 40 )  Long Term Goal 5: Patient will verbalize compliance with her HEP for continued progression (ongoing)     Plan:   [x] Continue per plan of care [] Alter current plan (see comments)  [] Plan of care initiated [] Hold pending MD visit [] Discharge    Plan for Next Session:     Electronically signed by:  Julio Dick PT

## 2023-04-04 NOTE — FLOWSHEET NOTE
[facility]    [x] [place]  Phone: [phone]  Fax: [phone]      [] [place]  Phone: [phone]  Fax: [phone]    Occupational Therapy Daily Treatment Note  Date:  2023    Patient Name:  Gretchen Chamorro    :  1970  MRN: [id number]  Restrictions/Precautions:      Medical/Treatment Diagnosis Information:    Cervico-occipital neuralgia of left side     Insurance/Certification information:   Physician Information:  SHERRI Victoria CNP  Plan of care signed (Y/N):  n    Visit# / total visits:  1    Pain level: 6/10     Progress Note: [x]  Yes  []  No  Next due by: Visit #10      Date of evaluation/re-evaluation:  23-23    Time In: 235  Time Out:  330    Subjective:   See progress note      Objective/Assessment:   See progress note      Exercises:     Therapeutic Exercise  [] Provided verbal/tactile cueing for activities related to strengthening, flexibility, endurance, ROM. (09116)  Neuro  Re-Ed  [] Provided verbal/tactile cueing for activities related to improving balance, coordination, kinesthetic sense, posture, motor skill, proprioception. (39935)     Therapeutic Activities/ADL:   [] Provided use of dynamic activities to improve functional performance (01320)  [] Provided self-care/home management training for activities of daily living and compensatory training (85355)     Manual Treatments:   [] Provided manual therapy to mobilize soft tissue/joints for the purpose of modulating pain, promoting relaxation, increasing ROM, reducing/eliminating soft tissue swelling/inflammation/restriction, improving soft tissue extensibility. (01684)     Orthotic Management:   [] Provided assessment and fitting orthotic device for improved functional performance.  (44956)    Service Based Modalities:      Timed Code Treatment Minutes:   40 manual    Total Treatment Minutes:   55    Treatment/Activity Tolerance:  [] Patient tolerated

## 2023-04-04 NOTE — PLAN OF CARE
please don't hesitate to call.   Thank you for your referral.      Physician Signature:________________________________Date:__________________  By signing above, therapists plan is approved by physician

## 2023-04-04 NOTE — PROGRESS NOTES
Low Complexity  REQUIRES OT FOLLOW-UP: Yes       Plan:    Occupational Therapy Plan  Plan Weeks: 4 weeks      Goals:   Long Term Goals  Long Term Goal 1: Patient to verbalize decreased head pain to daily average < 2/10  Long Term Goal 2: Patient to verbalize decreased frequency of intense head pain < 2x/wk  Long Term Goal 3: Patient to be independent with home program  Long Term Goal 4: Patient to demonstrate improved pain and quality of life with MSQL < 26 (mild)       Therapy Time:   Individual Concurrent Group Co-treatment   Time In 1435         Time Out 1530         Minutes 55         Timed Code Treatment Minutes: [address] [facility], OT

## 2023-04-11 ENCOUNTER — APPOINTMENT (OUTPATIENT)
Dept: PHYSICAL THERAPY | Age: 53
End: 2023-04-11
Payer: COMMERCIAL

## 2023-04-25 ENCOUNTER — HOSPITAL ENCOUNTER (OUTPATIENT)
Dept: PHYSICAL THERAPY | Age: 53
Setting detail: THERAPIES SERIES
Discharge: HOME | End: 2023-04-25
Payer: COMMERCIAL

## 2023-04-25 PROCEDURE — 97140 MANUAL THERAPY 1/> REGIONS: CPT
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
## 2023-04-27 ENCOUNTER — HOSPITAL ENCOUNTER (OUTPATIENT)
Dept: PHYSICAL THERAPY | Age: 53
Setting detail: THERAPIES SERIES
Discharge: HOME | End: 2023-04-27
Payer: COMMERCIAL

## 2023-04-27 NOTE — PROGRESS NOTES
Physical Therapy    Outpatient Physical Therapy    [x] [place]  Phone: [phone]  Fax: [phone]      [] [place]  Phone: [phone]  Fax: [phone]    Physical Therapy  Cancellation/No-show Note  Patient Name:  Shukri Kilgore  :  1970   Date:  2023  Cancelled visits to date: 4  No-shows to date: 0    For today's appointment patient:  [x]  Cancelled  []  Rescheduled appointment  []  No-show     Reason given by patient:  [x]  Patient ill  []  Conflicting appointment  []  No transportation    []  Conflict with work  []  No reason given  []  Other:     Comments:      Electronically signed by: Angelina Doss PT

## 2023-06-30 RX ORDER — SULFAMETHOXAZOLE AND TRIMETHOPRIM 800; 160 MG/1; MG/1
1 TABLET ORAL 2 TIMES DAILY
Qty: 20 TABLET | Refills: 0 | Status: SHIPPED | OUTPATIENT
Start: 2023-06-30 | End: 2023-07-10

## 2023-08-16 ENCOUNTER — OFFICE VISIT (OUTPATIENT)
Dept: FAMILY MEDICINE CLINIC | Age: 53
End: 2023-08-16
Payer: COMMERCIAL

## 2023-08-16 VITALS
HEART RATE: 75 BPM | OXYGEN SATURATION: 95 % | HEIGHT: 67 IN | WEIGHT: 200 LBS | SYSTOLIC BLOOD PRESSURE: 104 MMHG | DIASTOLIC BLOOD PRESSURE: 64 MMHG | BODY MASS INDEX: 31.39 KG/M2

## 2023-08-16 DIAGNOSIS — K21.9 GASTROESOPHAGEAL REFLUX DISEASE WITHOUT ESOPHAGITIS: ICD-10-CM

## 2023-08-16 DIAGNOSIS — F41.9 ANXIETY: Primary | ICD-10-CM

## 2023-08-16 DIAGNOSIS — I82.431 ACUTE DEEP VEIN THROMBOSIS (DVT) OF POPLITEAL VEIN OF RIGHT LOWER EXTREMITY (HCC): ICD-10-CM

## 2023-08-16 DIAGNOSIS — I47.29 VENTRICULAR TACHYCARDIA, INDUCIBLE (HCC): ICD-10-CM

## 2023-08-16 DIAGNOSIS — M54.81 CERVICO-OCCIPITAL NEURALGIA OF LEFT SIDE: ICD-10-CM

## 2023-08-16 DIAGNOSIS — Z12.31 ENCOUNTER FOR SCREENING MAMMOGRAM FOR BREAST CANCER: ICD-10-CM

## 2023-08-16 DIAGNOSIS — N95.1 MENOPAUSAL SYMPTOMS: ICD-10-CM

## 2023-08-16 DIAGNOSIS — K58.2 IRRITABLE BOWEL SYNDROME WITH BOTH CONSTIPATION AND DIARRHEA: ICD-10-CM

## 2023-08-16 DIAGNOSIS — Z86.018 HISTORY OF UTERINE FIBROID: ICD-10-CM

## 2023-08-16 PROCEDURE — 99214 OFFICE O/P EST MOD 30 MIN: CPT | Performed by: FAMILY MEDICINE

## 2023-08-16 ASSESSMENT — ENCOUNTER SYMPTOMS
EYES NEGATIVE: 1
RESPIRATORY NEGATIVE: 1
ANAL BLEEDING: 0
NAUSEA: 0
ALLERGIC/IMMUNOLOGIC NEGATIVE: 1
CONSTIPATION: 1
ABDOMINAL PAIN: 0
DIARRHEA: 1
VOMITING: 0

## 2023-09-11 DIAGNOSIS — F51.01 PRIMARY INSOMNIA: ICD-10-CM

## 2023-09-11 RX ORDER — ALPRAZOLAM 0.25 MG/1
0.25 TABLET ORAL NIGHTLY PRN
Qty: 30 TABLET | Refills: 0 | Status: SHIPPED | OUTPATIENT
Start: 2023-09-11 | End: 2023-10-11

## 2023-09-11 NOTE — TELEPHONE ENCOUNTER
Per [facility], last fill 04/11/2023, quantity 30 for 30 days. Jefry Thorne called requesting a refill of the below medication which has been pended for you:     Requested Prescriptions     Pending Prescriptions Disp Refills    ALPRAZolam (XANAX) 0.25 MG tablet 30 tablet 0     Sig: Take 1 tablet by mouth nightly as needed for Sleep for up to 30 days.        Last Appointment Date: 8/16/2023  Next Appointment Date: 2/19/2024    Allergies   Allergen Reactions    Doxycycline Calcium Nausea And Vomiting    Meloxicam Rash    Oxycodone-Acetaminophen Rash

## 2023-09-14 NOTE — TELEPHONE ENCOUNTER
Jose Fulton called requesting a refill of the below medication which has been pended for you: last filled 9/9/2020 #30. Sent patient a message to schedule appt in March for continued fills. Requested Prescriptions     Pending Prescriptions Disp Refills    ALPRAZolam (XANAX) 0.25 MG tablet 30 tablet 0     Sig: Take 1 tablet by mouth nightly as needed for Sleep for up to 30 days.        Last Appointment Date: 9/29/2020  Next Appointment Date: Visit date not found    Allergies   Allergen Reactions    Doxycycline Calcium Nausea And Vomiting    Meloxicam Rash    Oxycodone-Acetaminophen Rash Responded to consult that patient wanted to complete and Advance Care Plan while rounding in the Med Surg/Tele Unit 2.  provided education on [address] [place] [address] per the [place] and legal next of kin. Patient shared that she would like to discuss more with her  as well as her 3 children as she felt that her  would not be able to make such a decision based on what she would like to see happen. She also shared what she wanted and felt her oldest daughter would be able to make this type of decision but needed to talk to her  first. Advised of  availability. Rev.  Ana Maria Hinds, [address]

## 2023-09-22 ENCOUNTER — TELEPHONE (OUTPATIENT)
Dept: FAMILY MEDICINE CLINIC | Age: 53
End: 2023-09-22

## 2023-09-22 NOTE — TELEPHONE ENCOUNTER
No, I am surprised they are covering it, and suspect they might change their mind. There is an option of ozempic, but again I think we will have issues with insurance. If the pharmacy if going to cover it, I would wait on supply. Could try at another pharmacy. Since it is for wt. Loss, I would just wait until her regular pharmacy is supplied.

## 2023-09-22 NOTE — TELEPHONE ENCOUNTER
Received fax from pharmacy stating Abdullahi Paredes is currently on backorder. They are requesting script for alternate medication.  Would you like to send something else in?

## 2023-09-25 DIAGNOSIS — E66.09 CLASS 1 OBESITY DUE TO EXCESS CALORIES WITHOUT SERIOUS COMORBIDITY WITH BODY MASS INDEX (BMI) OF 31.0 TO 31.9 IN ADULT: ICD-10-CM

## 2023-09-25 RX ORDER — SEMAGLUTIDE 0.25 MG/.5ML
0.25 INJECTION, SOLUTION SUBCUTANEOUS
OUTPATIENT
Start: 2023-09-25 | End: 2023-12-24

## 2023-10-03 ENCOUNTER — HOSPITAL ENCOUNTER (OUTPATIENT)
Dept: MAMMOGRAPHY | Age: 53
Discharge: HOME | End: 2023-10-05
Payer: COMMERCIAL

## 2023-10-03 VITALS — WEIGHT: 199 LBS | BODY MASS INDEX: 31.23 KG/M2 | HEIGHT: 67 IN

## 2023-10-03 DIAGNOSIS — Z12.31 ENCOUNTER FOR SCREENING MAMMOGRAM FOR BREAST CANCER: ICD-10-CM

## 2023-10-03 PROCEDURE — 77063 BREAST TOMOSYNTHESIS BI: CPT

## 2023-10-10 RX ORDER — SERTRALINE HYDROCHLORIDE 25 MG/1
25 TABLET, FILM COATED ORAL DAILY
Qty: 30 TABLET | Refills: 3 | Status: SHIPPED | OUTPATIENT
Start: 2023-10-10

## 2023-10-10 NOTE — TELEPHONE ENCOUNTER
Miroslava Gonzales called requesting a refill of the below medication which has been pended for you:     Requested Prescriptions     Pending Prescriptions Disp Refills    sertraline (ZOLOFT) 25 MG tablet [Pharmacy Med Name: SERTRALINE HCL 25 MG TABLET] 30 tablet 3     Sig: TAKE 1 TABLET BY MOUTH DAILY       Last Appointment Date: 8/16/2023  Next Appointment Date: 2/19/2024    Allergies   Allergen Reactions    Doxycycline Calcium Nausea And Vomiting    Meloxicam Rash    Oxycodone-Acetaminophen Rash

## 2023-10-12 ENCOUNTER — HOSPITAL ENCOUNTER (OUTPATIENT)
Age: 53
Setting detail: SPECIMEN
Discharge: HOME | End: 2023-10-12
Payer: COMMERCIAL

## 2023-10-12 ENCOUNTER — HOSPITAL ENCOUNTER (OUTPATIENT)
Age: 53
Discharge: HOME | End: 2023-10-12
Payer: COMMERCIAL

## 2023-10-12 ENCOUNTER — OFFICE VISIT (OUTPATIENT)
Dept: FAMILY MEDICINE CLINIC | Age: 53
End: 2023-10-12
Payer: COMMERCIAL

## 2023-10-12 ENCOUNTER — HOSPITAL ENCOUNTER (OUTPATIENT)
Dept: CT IMAGING | Age: 53
Discharge: HOME | End: 2023-10-14
Payer: COMMERCIAL

## 2023-10-12 VITALS
DIASTOLIC BLOOD PRESSURE: 64 MMHG | SYSTOLIC BLOOD PRESSURE: 108 MMHG | HEART RATE: 54 BPM | HEIGHT: 67 IN | OXYGEN SATURATION: 98 % | WEIGHT: 200.4 LBS | BODY MASS INDEX: 31.45 KG/M2

## 2023-10-12 DIAGNOSIS — R10.30 LOWER ABDOMINAL PAIN: ICD-10-CM

## 2023-10-12 DIAGNOSIS — R30.0 BURNING WITH URINATION: Primary | ICD-10-CM

## 2023-10-12 DIAGNOSIS — R30.0 BURNING WITH URINATION: ICD-10-CM

## 2023-10-12 LAB
ANION GAP SERPL CALCULATED.3IONS-SCNC: 8 MMOL/L (ref 9–17)
BASOPHILS # BLD: <0.03 K/UL (ref 0–0.2)
BASOPHILS NFR BLD: 0 % (ref 0–2)
BILIRUB UR QL STRIP: NEGATIVE
BUN SERPL-MCNC: 12 MG/DL (ref 6–20)
BUN/CREAT SERPL: 13 (ref 9–20)
CALCIUM SERPL-MCNC: 9.2 MG/DL (ref 8.6–10.4)
CHLORIDE SERPL-SCNC: 103 MMOL/L (ref 98–107)
CLARITY UR: CLEAR
CO2 SERPL-SCNC: 28 MMOL/L (ref 20–31)
COLOR UR: YELLOW
COMMENT: NORMAL
CREAT SERPL-MCNC: 0.9 MG/DL (ref 0.5–0.9)
EOSINOPHIL # BLD: 0.13 K/UL (ref 0–0.44)
EOSINOPHILS RELATIVE PERCENT: 3 % (ref 1–4)
ERYTHROCYTE [DISTWIDTH] IN BLOOD BY AUTOMATED COUNT: 12.9 % (ref 11.8–14.4)
GFR SERPL CREATININE-BSD FRML MDRD: >60 ML/MIN/1.73M2
GLUCOSE SERPL-MCNC: 91 MG/DL (ref 70–99)
GLUCOSE UR STRIP-MCNC: NEGATIVE MG/DL
HCT VFR BLD AUTO: 37.8 % (ref 36.3–47.1)
HGB BLD-MCNC: 12.9 G/DL (ref 11.9–15.1)
HGB UR QL STRIP.AUTO: NEGATIVE
IMM GRANULOCYTES # BLD AUTO: 0.08 K/UL (ref 0–0.3)
IMM GRANULOCYTES NFR BLD: 2 %
KETONES UR STRIP-MCNC: NEGATIVE MG/DL
LEUKOCYTE ESTERASE UR QL STRIP: NEGATIVE
LYMPHOCYTES NFR BLD: 1.48 K/UL (ref 1.1–3.7)
LYMPHOCYTES RELATIVE PERCENT: 29 % (ref 24–43)
MCH RBC QN AUTO: 30.7 PG (ref 25.2–33.5)
MCHC RBC AUTO-ENTMCNC: 34.1 G/DL (ref 25.2–33.5)
MCV RBC AUTO: 90 FL (ref 82.6–102.9)
MONOCYTES NFR BLD: 0.43 K/UL (ref 0.1–1.2)
MONOCYTES NFR BLD: 8 % (ref 3–12)
NEUTROPHILS NFR BLD: 59 % (ref 36–65)
NEUTS SEG NFR BLD: 3.04 K/UL (ref 1.5–8.1)
NITRITE UR QL STRIP: NEGATIVE
PH UR STRIP: 5.5 [PH] (ref 5–6)
PLATELET # BLD AUTO: 261 K/UL (ref 138–453)
PMV BLD AUTO: 10.1 FL (ref 8.1–13.5)
POTASSIUM SERPL-SCNC: 4.3 MMOL/L (ref 3.7–5.3)
PROT UR STRIP-MCNC: NEGATIVE MG/DL
RBC # BLD AUTO: 4.2 M/UL (ref 3.95–5.11)
SODIUM SERPL-SCNC: 139 MMOL/L (ref 135–144)
SP GR UR STRIP: 1.02 (ref 1.01–1.02)
UROBILINOGEN UR STRIP-ACNC: NORMAL EU/DL (ref 0–1)
WBC OTHER # BLD: 5.2 K/UL (ref 3.5–11.3)

## 2023-10-12 PROCEDURE — 99214 OFFICE O/P EST MOD 30 MIN: CPT | Performed by: FAMILY MEDICINE

## 2023-10-12 PROCEDURE — 80048 BASIC METABOLIC PNL TOTAL CA: CPT

## 2023-10-12 PROCEDURE — 74177 CT ABD & PELVIS W/CONTRAST: CPT

## 2023-10-12 PROCEDURE — 6360000004 HC RX CONTRAST MEDICATION: Performed by: FAMILY MEDICINE

## 2023-10-12 PROCEDURE — 85025 COMPLETE CBC W/AUTO DIFF WBC: CPT

## 2023-10-12 PROCEDURE — 36415 COLL VENOUS BLD VENIPUNCTURE: CPT

## 2023-10-12 PROCEDURE — 81003 URINALYSIS AUTO W/O SCOPE: CPT

## 2023-10-12 RX ADMIN — IOPAMIDOL 100 ML: 755 INJECTION, SOLUTION INTRAVENOUS at 12:21

## 2023-10-12 ASSESSMENT — ENCOUNTER SYMPTOMS
CONSTIPATION: 0
BLOOD IN STOOL: 0
VOMITING: 1
EYES NEGATIVE: 1
NAUSEA: 1
RESPIRATORY NEGATIVE: 1
DIARRHEA: 0
ABDOMINAL PAIN: 1

## 2023-10-12 NOTE — PROGRESS NOTES
Subjective:      Patient ID: Dayday Guzman is a 48 y.o. female. HPI    Acute visit for lower abdominal /suprapubic since Sunday evening. Sharp pain , bending forward causes pain. Associated with nausea. Vomited twice yesterday. No fever. No hematuria ,  some dysuria and frequency. No diarrhea or constipation concerns. Past Medical History:   Diagnosis Date    Anxiety     DVT of popliteal vein ([address]) 08/2018    right leg, following knee surgery    Reflux     Ventricular tachycardia, inducible ([address]) 01/18/2019    syncope->abnormal ekg/ischemia?->normal cath. ->ep with sustained/non sustained/inducible monomorphic VT [facility]     Past Surgical History:   Procedure Laterality Date    CARDIAC DEFIBRILLATOR PLACEMENT Left 01/2019    [facility].  sustained VT, non sustained/inducible VT on EP study. CHOLECYSTECTOMY  12/23/2010    COLONOSCOPY  06/04/2018    [facility]. [facility], single polyp cecum, diverticuli , follow up 5 years. COLONOSCOPY N/A 06/10/2020    *COLONOSCOPY performed by Amita Botello DO at [address]  ~2000    Dr. Silvana Betancourt   fibroid/bleeding    KNEE SURGERY  11/14/2016    KNEE SURGERY Right 07/02/2018    rt knee scope, scar debridement, MMD, patellofemoral ligament repair, microfracture of medial femoral condyle    LAPAROSCOPY  09/12/2012    Exploratory; lysis of adhesions. UPPER GASTROINTESTINAL ENDOSCOPY  07/23/2012    Grade 2 esophagitis. UPPER GASTROINTESTINAL ENDOSCOPY  12/17/2010    small hiatal hernia    UPPER GASTROINTESTINAL ENDOSCOPY N/A 06/10/2020    EGD BIOPSY performed by Amita Botello DO at [facility] Left 08/2023    Left neck venous ablation       Review of Systems   Constitutional:  Positive for appetite change. HENT: Negative. Eyes: Negative. Respiratory: Negative. Cardiovascular: Negative.     Gastrointestinal:  Positive for abdominal pain, nausea and

## 2024-01-08 ENCOUNTER — OFFICE VISIT (OUTPATIENT)
Dept: PRIMARY CARE CLINIC | Age: 54
End: 2024-01-08
Payer: COMMERCIAL

## 2024-01-08 VITALS
OXYGEN SATURATION: 98 % | TEMPERATURE: 98.3 F | WEIGHT: 199 LBS | BODY MASS INDEX: 31.17 KG/M2 | DIASTOLIC BLOOD PRESSURE: 74 MMHG | HEART RATE: 74 BPM | SYSTOLIC BLOOD PRESSURE: 112 MMHG

## 2024-01-08 DIAGNOSIS — F51.01 PRIMARY INSOMNIA: ICD-10-CM

## 2024-01-08 DIAGNOSIS — H60.392 OTHER INFECTIVE ACUTE OTITIS EXTERNA OF LEFT EAR: Primary | ICD-10-CM

## 2024-01-08 DIAGNOSIS — H66.002 NON-RECURRENT ACUTE SUPPURATIVE OTITIS MEDIA OF LEFT EAR WITHOUT SPONTANEOUS RUPTURE OF TYMPANIC MEMBRANE: ICD-10-CM

## 2024-01-08 PROCEDURE — 99213 OFFICE O/P EST LOW 20 MIN: CPT | Performed by: NURSE PRACTITIONER

## 2024-01-08 RX ORDER — ALPRAZOLAM 0.25 MG/1
0.25 TABLET ORAL NIGHTLY PRN
Qty: 30 TABLET | Refills: 0 | Status: SHIPPED | OUTPATIENT
Start: 2024-01-08 | End: 2024-02-07

## 2024-01-08 RX ORDER — AMOXICILLIN 500 MG/1
500 CAPSULE ORAL 2 TIMES DAILY
Qty: 14 CAPSULE | Refills: 0 | Status: SHIPPED | OUTPATIENT
Start: 2024-01-08 | End: 2024-01-15

## 2024-01-08 ASSESSMENT — PATIENT HEALTH QUESTIONNAIRE - PHQ9
SUM OF ALL RESPONSES TO PHQ9 QUESTIONS 1 & 2: 0
SUM OF ALL RESPONSES TO PHQ QUESTIONS 1-9: 0
SUM OF ALL RESPONSES TO PHQ QUESTIONS 1-9: 0
1. LITTLE INTEREST OR PLEASURE IN DOING THINGS: 0
2. FEELING DOWN, DEPRESSED OR HOPELESS: 0
SUM OF ALL RESPONSES TO PHQ QUESTIONS 1-9: 0
SUM OF ALL RESPONSES TO PHQ QUESTIONS 1-9: 0

## 2024-01-08 NOTE — PROGRESS NOTES
Subjective:      Patient ID: Kadi Snyder is a 53 y.o. female coming in for   Chief Complaint   Patient presents with    Otalgia        Otalgia   There is pain in the left ear. This is a new problem. Episode onset: x7days. There has been no fever. Pertinent negatives include no ear discharge. Associated symptoms comments: Pt had a URI approx two weeks ago.         Review of Systems   HENT:  Positive for ear pain. Negative for ear discharge.         Objective:/74 (Site: Left Upper Arm, Position: Sitting, Cuff Size: Large Adult)   Pulse 74   Temp 98.3 °F (36.8 °C) (Tympanic)   Wt 90.3 kg (199 lb)   SpO2 98%   BMI 31.17 kg/m²      Physical Exam  Vitals and nursing note reviewed.   Constitutional:       General: She is not in acute distress.     Appearance: Normal appearance. She is not ill-appearing.   HENT:      Head: Normocephalic.      Right Ear: Tympanic membrane normal.      Left Ear: Swelling present.      Ears:      Comments: Left ear canal erythematous and edematous, unable to visualize TM due to edema      Nose: Nose normal.      Mouth/Throat:      Mouth: Mucous membranes are moist.      Pharynx: Oropharynx is clear. No oropharyngeal exudate or posterior oropharyngeal erythema.   Cardiovascular:      Rate and Rhythm: Normal rate and regular rhythm.      Heart sounds: Normal heart sounds.   Pulmonary:      Effort: Pulmonary effort is normal.      Breath sounds: Normal breath sounds.   Musculoskeletal:      Cervical back: Neck supple. Tenderness present.   Skin:     General: Skin is warm and dry.      Findings: No rash.   Neurological:      General: No focal deficit present.      Mental Status: She is alert and oriented to person, place, and time.          Assessment:      1. Other infective acute otitis externa of left ear    2. Non-recurrent acute suppurative otitis media of left ear without spontaneous rupture of tympanic membrane           Plan:    -treating both externa and media with drops

## 2024-01-08 NOTE — TELEPHONE ENCOUNTER
Per [facility], last fill 09/11/2023, quantity 30 for 30 days.   Kadi called requesting a refill of the below medication which has been pended for you:     Requested Prescriptions     Pending Prescriptions Disp Refills    ALPRAZolam (XANAX) 0.25 MG tablet 30 tablet 0     Sig: Take 1 tablet by mouth nightly as needed for Sleep for up to 30 days.       Last Appointment Date: 10/12/2023  Next Appointment Date: 2/21/2024    Allergies   Allergen Reactions    Doxycycline Calcium Nausea And Vomiting    Meloxicam Rash    Oxycodone-Acetaminophen Rash

## 2024-01-08 NOTE — PATIENT INSTRUCTIONS
You have an infection in both the canal and on the ear drum.   Take drops and amoxicillin as prescribed  Tylenol/motrin for pain  Call my office in one week if symptoms worsen/persist

## 2024-01-23 ENCOUNTER — TELEPHONE (OUTPATIENT)
Dept: FAMILY MEDICINE CLINIC | Age: 54
End: 2024-01-23

## 2024-01-23 NOTE — TELEPHONE ENCOUNTER
Received fax from pt's pharmacy stating her Wegovy is on backorder and requesting an alternative medication. Writer called pt and advised of this and inquired if she would like to try an alternative medication. Pt states it has been difficult to get this medication d/t the constant backorder, but it is working for her, so she would like to wait to change for now. She also stated she would look into if Zepbound is covered, and she will send a TRAFI message with what she finds out.

## 2024-02-05 DIAGNOSIS — R09.82 POST-NASAL DRIP: ICD-10-CM

## 2024-02-05 DIAGNOSIS — B96.89 ACUTE BACTERIAL SINUSITIS: ICD-10-CM

## 2024-02-05 DIAGNOSIS — J01.90 ACUTE BACTERIAL SINUSITIS: ICD-10-CM

## 2024-02-05 DIAGNOSIS — H66.002 ACUTE SUPPURATIVE OTITIS MEDIA OF LEFT EAR WITHOUT SPONTANEOUS RUPTURE OF TYMPANIC MEMBRANE, RECURRENCE NOT SPECIFIED: ICD-10-CM

## 2024-02-05 RX ORDER — SERTRALINE HYDROCHLORIDE 25 MG/1
25 TABLET, FILM COATED ORAL DAILY
Qty: 30 TABLET | Refills: 3 | Status: SHIPPED | OUTPATIENT
Start: 2024-02-05

## 2024-02-05 RX ORDER — TRIAMCINOLONE ACETONIDE 55 UG/1
SPRAY, METERED NASAL
OUTPATIENT
Start: 2024-02-05

## 2024-02-05 NOTE — TELEPHONE ENCOUNTER
Kadi called requesting a refill of the below medication which has been pended for you:     Requested Prescriptions     Pending Prescriptions Disp Refills    sertraline (ZOLOFT) 25 MG tablet [Pharmacy Med Name: SERTRALINE HCL 25 MG TABLET] 30 tablet 3     Sig: TAKE 1 TABLET BY MOUTH DAILY       Last Appointment Date: 10/12/2023  Next Appointment Date: 2/21/2024    Allergies   Allergen Reactions    Doxycycline Calcium Nausea And Vomiting    Meloxicam Rash    Oxycodone-Acetaminophen Rash

## 2024-02-16 RX ORDER — OMEPRAZOLE 40 MG/1
40 CAPSULE, DELAYED RELEASE ORAL DAILY
Qty: 30 CAPSULE | Refills: 5 | Status: SHIPPED | OUTPATIENT
Start: 2024-02-16

## 2024-02-16 RX ORDER — CIPROFLOXACIN 500 MG/1
500 TABLET, FILM COATED ORAL 2 TIMES DAILY
Qty: 20 TABLET | Refills: 0 | Status: SHIPPED | OUTPATIENT
Start: 2024-02-16 | End: 2024-02-26

## 2024-02-21 ENCOUNTER — OFFICE VISIT (OUTPATIENT)
Dept: FAMILY MEDICINE CLINIC | Age: 54
End: 2024-02-21
Payer: COMMERCIAL

## 2024-02-21 VITALS
HEIGHT: 67 IN | RESPIRATION RATE: 18 BRPM | HEART RATE: 61 BPM | WEIGHT: 191 LBS | BODY MASS INDEX: 29.98 KG/M2 | OXYGEN SATURATION: 98 % | DIASTOLIC BLOOD PRESSURE: 62 MMHG | SYSTOLIC BLOOD PRESSURE: 94 MMHG

## 2024-02-21 DIAGNOSIS — I47.29 VENTRICULAR TACHYCARDIA, INDUCIBLE (HCC): ICD-10-CM

## 2024-02-21 DIAGNOSIS — I82.431 ACUTE DEEP VEIN THROMBOSIS (DVT) OF POPLITEAL VEIN OF RIGHT LOWER EXTREMITY (HCC): ICD-10-CM

## 2024-02-21 DIAGNOSIS — K21.9 GASTROESOPHAGEAL REFLUX DISEASE WITHOUT ESOPHAGITIS: Primary | ICD-10-CM

## 2024-02-21 DIAGNOSIS — N95.1 MENOPAUSAL SYMPTOMS: ICD-10-CM

## 2024-02-21 DIAGNOSIS — K58.2 IRRITABLE BOWEL SYNDROME WITH BOTH CONSTIPATION AND DIARRHEA: ICD-10-CM

## 2024-02-21 DIAGNOSIS — S13.9XXA NECK SPRAIN, INITIAL ENCOUNTER: ICD-10-CM

## 2024-02-21 PROCEDURE — 99214 OFFICE O/P EST MOD 30 MIN: CPT | Performed by: FAMILY MEDICINE

## 2024-02-21 RX ORDER — TRIAMCINOLONE ACETONIDE 55 UG/1
2 SPRAY, METERED NASAL DAILY
Qty: 1 EACH | Refills: 0 | Status: SHIPPED | OUTPATIENT
Start: 2024-02-21

## 2024-02-21 SDOH — ECONOMIC STABILITY: FOOD INSECURITY: WITHIN THE PAST 12 MONTHS, THE FOOD YOU BOUGHT JUST DIDN'T LAST AND YOU DIDN'T HAVE MONEY TO GET MORE.: PATIENT DECLINED

## 2024-02-21 SDOH — ECONOMIC STABILITY: INCOME INSECURITY: HOW HARD IS IT FOR YOU TO PAY FOR THE VERY BASICS LIKE FOOD, HOUSING, MEDICAL CARE, AND HEATING?: PATIENT DECLINED

## 2024-02-21 SDOH — ECONOMIC STABILITY: HOUSING INSECURITY
IN THE LAST 12 MONTHS, WAS THERE A TIME WHEN YOU DID NOT HAVE A STEADY PLACE TO SLEEP OR SLEPT IN A SHELTER (INCLUDING NOW)?: PATIENT DECLINED

## 2024-02-21 SDOH — ECONOMIC STABILITY: FOOD INSECURITY: WITHIN THE PAST 12 MONTHS, YOU WORRIED THAT YOUR FOOD WOULD RUN OUT BEFORE YOU GOT MONEY TO BUY MORE.: PATIENT DECLINED

## 2024-02-21 ASSESSMENT — ENCOUNTER SYMPTOMS
ABDOMINAL PAIN: 0
VOMITING: 0
NAUSEA: 0
RESPIRATORY NEGATIVE: 1
ALLERGIC/IMMUNOLOGIC NEGATIVE: 1
CONSTIPATION: 1
EYES NEGATIVE: 1
DIARRHEA: 1
ANAL BLEEDING: 0
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
## 2024-02-21 NOTE — PROGRESS NOTES
Going to pain management for her neck but not having relief, questioning if needing to see neurologist. Declines vaccines at this time.

## 2024-05-16 ENCOUNTER — OFFICE VISIT (OUTPATIENT)
Dept: FAMILY MEDICINE CLINIC | Age: 54
End: 2024-05-16
Payer: COMMERCIAL

## 2024-05-16 VITALS
SYSTOLIC BLOOD PRESSURE: 114 MMHG | HEART RATE: 60 BPM | WEIGHT: 192 LBS | OXYGEN SATURATION: 96 % | HEIGHT: 67 IN | DIASTOLIC BLOOD PRESSURE: 60 MMHG | BODY MASS INDEX: 30.13 KG/M2

## 2024-05-16 DIAGNOSIS — G89.29 CHRONIC NECK PAIN: Primary | ICD-10-CM

## 2024-05-16 DIAGNOSIS — F41.9 ANXIETY: ICD-10-CM

## 2024-05-16 DIAGNOSIS — M54.2 CHRONIC NECK PAIN: Primary | ICD-10-CM

## 2024-05-16 PROCEDURE — 99214 OFFICE O/P EST MOD 30 MIN: CPT | Performed by: FAMILY MEDICINE

## 2024-05-16 SDOH — ECONOMIC STABILITY: FOOD INSECURITY: WITHIN THE PAST 12 MONTHS, THE FOOD YOU BOUGHT JUST DIDN'T LAST AND YOU DIDN'T HAVE MONEY TO GET MORE.: NEVER TRUE

## 2024-05-16 SDOH — ECONOMIC STABILITY: FOOD INSECURITY: WITHIN THE PAST 12 MONTHS, YOU WORRIED THAT YOUR FOOD WOULD RUN OUT BEFORE YOU GOT MONEY TO BUY MORE.: NEVER TRUE

## 2024-05-16 SDOH — ECONOMIC STABILITY: INCOME INSECURITY: HOW HARD IS IT FOR YOU TO PAY FOR THE VERY BASICS LIKE FOOD, HOUSING, MEDICAL CARE, AND HEATING?: NOT HARD AT ALL

## 2024-05-16 ASSESSMENT — ENCOUNTER SYMPTOMS
RESPIRATORY NEGATIVE: 1
EYES NEGATIVE: 1
ALLERGIC/IMMUNOLOGIC NEGATIVE: 1
GASTROINTESTINAL NEGATIVE: 1

## 2024-05-16 ASSESSMENT — PATIENT HEALTH QUESTIONNAIRE - PHQ9
2. FEELING DOWN, DEPRESSED OR HOPELESS: NOT AT ALL
SUM OF ALL RESPONSES TO PHQ QUESTIONS 1-9: 0
1. LITTLE INTEREST OR PLEASURE IN DOING THINGS: NOT AT ALL
SUM OF ALL RESPONSES TO PHQ QUESTIONS 1-9: 0
SUM OF ALL RESPONSES TO PHQ9 QUESTIONS 1 & 2: 0
SUM OF ALL RESPONSES TO PHQ QUESTIONS 1-9: 0
SUM OF ALL RESPONSES TO PHQ QUESTIONS 1-9: 0

## 2024-05-16 NOTE — PROGRESS NOTES
is well-developed.   HENT:      Head: Normocephalic and atraumatic.      Right Ear: External ear normal.      Left Ear: External ear normal.      Mouth/Throat:      Pharynx: No oropharyngeal exudate.   Eyes:      General: No scleral icterus.     Conjunctiva/sclera: Conjunctivae normal.   Neck:      Thyroid: No thyromegaly.   Cardiovascular:      Rate and Rhythm: Normal rate and regular rhythm.      Heart sounds: Normal heart sounds. No murmur heard.  Pulmonary:      Effort: Pulmonary effort is normal. No respiratory distress.      Breath sounds: Normal breath sounds. No wheezing.   Abdominal:      General: Bowel sounds are normal. There is no distension.      Palpations: Abdomen is soft.      Tenderness: There is no abdominal tenderness. There is no rebound.   Musculoskeletal:         General: No tenderness. Normal range of motion.      Cervical back: Neck supple.   Skin:     General: Skin is warm and dry.      Findings: No erythema or rash.   Neurological:      Mental Status: She is alert and oriented to person, place, and time.   Psychiatric:         Behavior: Behavior normal.         Thought Content: Thought content normal.         Judgment: Judgment normal.     /60 (Site: Right Upper Arm, Position: Sitting, Cuff Size: Medium Adult)   Pulse 60   Ht 1.702 m (5' 7\")   Wt 87.1 kg (192 lb)   SpO2 96%   BMI 30.07 kg/m²       Assessment:      Encounter Diagnoses   Name Primary?    Chronic neck pain Yes    Anxiety            Plan:     Neck pain.  Muscular issues.  Intermittent left arm symptoms.  Trigger point injections worked well for 2 months.  Other interventions not helping .  Rec. Pursuing further trigger points.      Anxiety.  Feeling zoloft working well, but not wt. Neutral and adding to her wt..  She was doing well on wegovy but 2nd dose caused too much nausea.  Rec.          Tremaine Negro MD

## 2024-05-22 ENCOUNTER — PATIENT MESSAGE (OUTPATIENT)
Dept: FAMILY MEDICINE CLINIC | Age: 54
End: 2024-05-22

## 2024-05-22 DIAGNOSIS — E66.09 CLASS 1 OBESITY DUE TO EXCESS CALORIES WITHOUT SERIOUS COMORBIDITY WITH BODY MASS INDEX (BMI) OF 31.0 TO 31.9 IN ADULT: ICD-10-CM

## 2024-05-22 RX ORDER — SEMAGLUTIDE 0.25 MG/.5ML
0.25 INJECTION, SOLUTION SUBCUTANEOUS
Qty: 2 ML | Refills: 2 | Status: SHIPPED | OUTPATIENT
Start: 2024-05-22

## 2024-05-22 NOTE — TELEPHONE ENCOUNTER
From: Kadi Snyder  To: Dr. Tremaine Negro  Sent: 5/22/2024 8:19 AM EDT  Subject: Chirag ORR, when I was in last week, you were going to start me back up at .25 on Wegovy. Was that prescription sent in?  Thanks!

## 2024-05-22 NOTE — TELEPHONE ENCOUNTER
Kadi called requesting a refill of the below medication which has been pended for you:     Requested Prescriptions     Pending Prescriptions Disp Refills    Semaglutide-Weight Management (WEGOVY) 0.25 MG/0.5ML SOAJ SC injection 2 mL 2     Sig: Inject 0.25 mg into the skin every 7 days       Last Appointment Date: 5/16/2024  Next Appointment Date: 8/21/2024    Allergies   Allergen Reactions    Doxycycline Calcium Nausea And Vomiting    Meloxicam Rash    Oxycodone-Acetaminophen Rash

## 2024-06-05 RX ORDER — ONDANSETRON 4 MG/1
4 TABLET, ORALLY DISINTEGRATING ORAL 3 TIMES DAILY PRN
Qty: 21 TABLET | Refills: 0 | Status: SHIPPED | OUTPATIENT
Start: 2024-06-05

## 2024-06-05 NOTE — TELEPHONE ENCOUNTER
From: Kadi Snyder  Sent: 6/5/2024 12:37 PM EDT  To: [facility] Clinical Staff  Subject: Wegovy    I forgot to ask Dr ORR for some zofran as well. Can you check with him and see if he can send in a prescription for that? Thanks!

## 2024-06-07 RX ORDER — METOPROLOL SUCCINATE 50 MG/1
50 TABLET, EXTENDED RELEASE ORAL DAILY
COMMUNITY
Start: 2024-05-23

## 2024-06-07 RX ORDER — OMEPRAZOLE 20 MG/1
20 CAPSULE, DELAYED RELEASE ORAL DAILY
COMMUNITY

## 2024-06-10 RX ORDER — SERTRALINE HYDROCHLORIDE 25 MG/1
25 TABLET, FILM COATED ORAL DAILY
Qty: 30 TABLET | Refills: 3 | Status: SHIPPED | OUTPATIENT
Start: 2024-06-10

## 2024-06-12 RX ORDER — SERTRALINE HYDROCHLORIDE 25 MG/1
25 TABLET, FILM COATED ORAL DAILY
Qty: 30 TABLET | Refills: 3 | OUTPATIENT
Start: 2024-06-12

## 2024-07-11 RX ORDER — SEMAGLUTIDE 0.5 MG/.5ML
0.5 INJECTION, SOLUTION SUBCUTANEOUS
Qty: 2 ML | Refills: 2 | Status: SHIPPED | OUTPATIENT
Start: 2024-07-11

## 2024-07-29 RX ORDER — METRONIDAZOLE 7.5 MG/G
1 GEL VAGINAL NIGHTLY
Qty: 70 G | Refills: 0 | Status: SHIPPED | OUTPATIENT
Start: 2024-07-29 | End: 2024-08-03

## 2024-08-08 ENCOUNTER — OFFICE VISIT (OUTPATIENT)
Dept: PRIMARY CARE CLINIC | Age: 54
End: 2024-08-08
Payer: COMMERCIAL

## 2024-08-08 VITALS
DIASTOLIC BLOOD PRESSURE: 70 MMHG | TEMPERATURE: 97.5 F | WEIGHT: 200 LBS | BODY MASS INDEX: 31.39 KG/M2 | HEART RATE: 58 BPM | HEIGHT: 67 IN | SYSTOLIC BLOOD PRESSURE: 110 MMHG | RESPIRATION RATE: 16 BRPM | OXYGEN SATURATION: 93 %

## 2024-08-08 DIAGNOSIS — H69.92 ACUTE DYSFUNCTION OF LEFT EUSTACHIAN TUBE: Primary | ICD-10-CM

## 2024-08-08 DIAGNOSIS — H60.502 ACUTE OTITIS EXTERNA OF LEFT EAR, UNSPECIFIED TYPE: ICD-10-CM

## 2024-08-08 PROCEDURE — 99213 OFFICE O/P EST LOW 20 MIN: CPT

## 2024-08-08 RX ORDER — PREDNISONE 20 MG/1
20 TABLET ORAL 2 TIMES DAILY
Qty: 10 TABLET | Refills: 0 | Status: SHIPPED | OUTPATIENT
Start: 2024-08-08 | End: 2024-08-13

## 2024-08-08 RX ORDER — CIPROFLOXACIN AND DEXAMETHASONE 3; 1 MG/ML; MG/ML
3 SUSPENSION/ DROPS AURICULAR (OTIC) 2 TIMES DAILY
Qty: 1 EACH | Refills: 0 | Status: SHIPPED | OUTPATIENT
Start: 2024-08-08 | End: 2024-08-15

## 2024-08-08 RX ORDER — CETIRIZINE HYDROCHLORIDE 10 MG/1
10 TABLET ORAL DAILY
Qty: 30 TABLET | Refills: 0 | Status: SHIPPED | OUTPATIENT
Start: 2024-08-08

## 2024-08-08 ASSESSMENT — ENCOUNTER SYMPTOMS
COUGH: 0
RHINORRHEA: 0
SORE THROAT: 0

## 2024-08-08 NOTE — PROGRESS NOTES
[facility]  [address] [facility] [address]  Phone: [phone]  Fax: [phone]      Kadi Snyder is a 54 y.o. female who presents to the [facility] today for her medical conditions/complaints as noted below. Kadi Snyder is c/o of Otalgia (Left ear pain x 4 days with vertigo)          HPI:     Otalgia   There is pain in both ears. This is a new (dizziness x 4 days ago- 2 days ago left ear pain) problem. The current episode started in the past 7 days. The problem occurs constantly. The problem has been waxing and waning. There has been no fever. The pain is at a severity of 4/10. The pain is moderate. Pertinent negatives include no coughing, ear discharge, rhinorrhea or sore throat. She has tried nothing for the symptoms. The treatment provided no relief. There is no history of a chronic ear infection or a tympanostomy tube.       Past Medical History:   Diagnosis Date    Anxiety     DVT of popliteal vein ([facility]) 08/2018    right leg, following knee surgery    Reflux     Ventricular tachycardia, inducible ([facility]) 01/18/2019    syncope->abnormal ekg/ischemia?->normal cath.->ep with sustained/non sustained/inducible monomorphic VT [facility]        Allergies   Allergen Reactions    Doxycycline     Doxycycline Calcium Nausea And Vomiting    Meloxicam Rash    Oxycodone-Acetaminophen Rash       Wt Readings from Last 3 Encounters:   08/08/24 90.7 kg (200 lb)   05/16/24 87.1 kg (192 lb)   02/21/24 86.6 kg (191 lb)     BP Readings from Last 3 Encounters:   08/08/24 110/70   05/16/24 114/60   02/21/24 94/62      Temp Readings from Last 3 Encounters:   08/08/24 97.5 °F (36.4 °C) (Tympanic)   01/08/24 98.3 °F (36.8 °C) (Tympanic)   02/20/23 97.7 °F (36.5 °C) (Tympanic)     Pulse Readings from Last 3 Encounters:   08/08/24 58   05/16/24 60   02/21/24 61     SpO2 Readings from Last 3 Encounters:   08/08/24 93%   05/16/24 96%

## 2024-08-08 NOTE — PATIENT INSTRUCTIONS
Apply drops as directed   Prednisone x 5 days  Ciprodex x 7 days to left ear- may apply to right if pain develops  Move positions slowly  Change loratidine to zyrtec x 7 days  Return if symptoms continue or worsen

## 2024-08-16 RX ORDER — CLOTRIMAZOLE AND BETAMETHASONE DIPROPIONATE 10; .64 MG/G; MG/G
CREAM TOPICAL
Qty: 45 G | Refills: 0 | Status: SHIPPED | OUTPATIENT
Start: 2024-08-16

## 2024-08-16 NOTE — TELEPHONE ENCOUNTER
Kadi called requesting a refill of the below medication which has been pended for you:     Requested Prescriptions     Pending Prescriptions Disp Refills    clotrimazole-betamethasone (LOTRISONE) 1-0.05 % cream [Pharmacy Med Name: CLOTRIMAZOLE-BETAMETH 1-0.05% CREAM] 45 g 0     Sig: APPLY TO AFFECTED AREA(S) TWO TIMES A DAY       Last Appointment Date: 5/16/2024  Next Appointment Date: 8/21/2024    Allergies   Allergen Reactions    Doxycycline     Doxycycline Calcium Nausea And Vomiting    Meloxicam Rash    Oxycodone-Acetaminophen Rash

## 2024-08-27 ENCOUNTER — OFFICE VISIT (OUTPATIENT)
Dept: PRIMARY CARE CLINIC | Age: 54
End: 2024-08-27
Payer: COMMERCIAL

## 2024-08-27 VITALS
BODY MASS INDEX: 31.55 KG/M2 | SYSTOLIC BLOOD PRESSURE: 98 MMHG | WEIGHT: 201 LBS | HEIGHT: 67 IN | OXYGEN SATURATION: 96 % | HEART RATE: 56 BPM | RESPIRATION RATE: 16 BRPM | DIASTOLIC BLOOD PRESSURE: 68 MMHG | TEMPERATURE: 97.9 F

## 2024-08-27 DIAGNOSIS — M54.12 CERVICAL RADICULOPATHY: ICD-10-CM

## 2024-08-27 DIAGNOSIS — H66.002 NON-RECURRENT ACUTE SUPPURATIVE OTITIS MEDIA OF LEFT EAR WITHOUT SPONTANEOUS RUPTURE OF TYMPANIC MEMBRANE: Primary | ICD-10-CM

## 2024-08-27 PROCEDURE — 96372 THER/PROPH/DIAG INJ SC/IM: CPT

## 2024-08-27 PROCEDURE — 99213 OFFICE O/P EST LOW 20 MIN: CPT

## 2024-08-27 RX ORDER — KETOROLAC TROMETHAMINE 30 MG/ML
30 INJECTION, SOLUTION INTRAMUSCULAR; INTRAVENOUS ONCE
Status: COMPLETED | OUTPATIENT
Start: 2024-08-27 | End: 2024-08-27

## 2024-08-27 RX ORDER — CIPROFLOXACIN AND DEXAMETHASONE 3; 1 MG/ML; MG/ML
SUSPENSION/ DROPS AURICULAR (OTIC)
COMMUNITY
Start: 2024-08-08

## 2024-08-27 RX ADMIN — KETOROLAC TROMETHAMINE 30 MG: 30 INJECTION, SOLUTION INTRAMUSCULAR; INTRAVENOUS at 12:33

## 2024-08-27 ASSESSMENT — ENCOUNTER SYMPTOMS: COUGH: 0
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
## 2024-08-27 NOTE — PATIENT INSTRUCTIONS
Augmentin x 10 days  Nasacort for congestion and to help with drainage  Take full course of antibiotic. Take Tylenol or ibuprofen for fever or pain. Keep ear dry and clean. Follow up with PCP if symptoms persist or worsen.    Cervical radiculopathy:  Toradol injection   Warm compresses for 15 minutes three times a day  Complete exercises as tolerated once pain subsides

## 2024-09-03 ENCOUNTER — OFFICE VISIT (OUTPATIENT)
Dept: FAMILY MEDICINE CLINIC | Age: 54
End: 2024-09-03
Payer: COMMERCIAL

## 2024-09-03 VITALS
OXYGEN SATURATION: 95 % | HEART RATE: 71 BPM | BODY MASS INDEX: 31.61 KG/M2 | HEIGHT: 67 IN | SYSTOLIC BLOOD PRESSURE: 110 MMHG | DIASTOLIC BLOOD PRESSURE: 66 MMHG | WEIGHT: 201.4 LBS

## 2024-09-03 DIAGNOSIS — I82.431 ACUTE DEEP VEIN THROMBOSIS (DVT) OF POPLITEAL VEIN OF RIGHT LOWER EXTREMITY (HCC): ICD-10-CM

## 2024-09-03 DIAGNOSIS — K21.9 GASTROESOPHAGEAL REFLUX DISEASE WITHOUT ESOPHAGITIS: ICD-10-CM

## 2024-09-03 DIAGNOSIS — K58.2 IRRITABLE BOWEL SYNDROME WITH BOTH CONSTIPATION AND DIARRHEA: ICD-10-CM

## 2024-09-03 DIAGNOSIS — S13.9XXA NECK SPRAIN, INITIAL ENCOUNTER: ICD-10-CM

## 2024-09-03 DIAGNOSIS — F51.01 PRIMARY INSOMNIA: ICD-10-CM

## 2024-09-03 DIAGNOSIS — I47.29 VENTRICULAR TACHYCARDIA, INDUCIBLE (HCC): ICD-10-CM

## 2024-09-03 DIAGNOSIS — F41.9 ANXIETY: Primary | ICD-10-CM

## 2024-09-03 DIAGNOSIS — N95.1 MENOPAUSAL SYMPTOMS: ICD-10-CM

## 2024-09-03 DIAGNOSIS — Z13.220 LIPID SCREENING: ICD-10-CM

## 2024-09-03 PROCEDURE — 99214 OFFICE O/P EST MOD 30 MIN: CPT | Performed by: FAMILY MEDICINE

## 2024-09-03 RX ORDER — ALPRAZOLAM 0.25 MG
0.25 TABLET ORAL NIGHTLY PRN
Qty: 30 TABLET | Refills: 0 | Status: SHIPPED | OUTPATIENT
Start: 2024-09-03 | End: 2024-10-03

## 2024-09-03 ASSESSMENT — ENCOUNTER SYMPTOMS
DIARRHEA: 1
NAUSEA: 0
VOMITING: 0
CONSTIPATION: 1
ANAL BLEEDING: 0
RESPIRATORY NEGATIVE: 1
ABDOMINAL PAIN: 0
ALLERGIC/IMMUNOLOGIC NEGATIVE: 1
EYES NEGATIVE: 1

## 2024-09-03 NOTE — PROGRESS NOTES
Subjective:      Patient ID: Kadi Snyder is a 54 y.o. female.    HPI   Routine follow up on chronic medical conditions.  Gaining wt. Over the last year.  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 Tried wegovy for 4 weeks but couldn't handle the nausea.   She reports her cardiologist does not want her to exercise due to \" Arythmogenic right ventricular cardiomyopathy\"    Menopause ongoing.  Decreased libido.    ibs symptoms intermittent/ongoing.  Some food triggers (salad).  Alternating constipation and diarrhea. More generalized abdominal aches.  History of  dvt, but no residual leg swelling.  icd placed for monomorphic VT.  No discharges.  gerd variable despite daily omeprazole.  Anxiety a little worse with new job.  Some stressors and more travel involved.  Still adjusting to the new job.  She feels fair balance in mood at present.    Had an AA on 11/23/22 and had some neck pain afterward.  Treated and released from ED same day.  CT head and spine, xray right knee, chest, left shoulder.  All unremarkable.  Still having pain more on the left side radiating into the arm.  Some spasm suspected.  Tried prednisone and at least 2 muscle relaxer's.  No perceived benefit.  Tramadol for pain, also without perceived improvement. Had a second burst of steroid, and given norco, which helped some.  She didn't want narcotics repetitively and switched to voltaren, without much efficacy perceived.  MRI ordered.    Impression   1. Reversal of the normal cervical lordosis which may be secondary to patient   positioning or muscle spasm.   2. Otherwise, unremarkable MRI of the cervical spine.       Following with pain management in [place].  Injection trials with efficacy and now ablation.  Lower left injection helped.  Pain near the occiput not as much.   This was completed about nov. 23, another ablation in occipital area about 3 weeks ago, then trigger point shots.  Cervical ablation helped,

## 2024-10-15 RX ORDER — SERTRALINE HYDROCHLORIDE 25 MG/1
25 TABLET, FILM COATED ORAL DAILY
Qty: 30 TABLET | Refills: 4 | Status: SHIPPED | OUTPATIENT
Start: 2024-10-15

## 2024-10-15 NOTE — TELEPHONE ENCOUNTER
Kadi called requesting a refill of the below medication which has been pended for you:     Requested Prescriptions     Pending Prescriptions Disp Refills    sertraline (ZOLOFT) 25 MG tablet [Pharmacy Med Name: SERTRALINE HCL 25 MG TABLET] 30 tablet 4     Sig: TAKE 1 TABLET BY MOUTH DAILY       Last Appointment Date: 9/3/2024  Next Appointment Date: 3/12/2025    Allergies   Allergen Reactions    Doxycycline     Doxycycline Calcium Nausea And Vomiting    Meloxicam Rash    Oxycodone-Acetaminophen Rash     
irregular
q2-5m

## 2024-11-12 ENCOUNTER — TELEPHONE (OUTPATIENT)
Dept: GENERAL RADIOLOGY | Age: 54
End: 2024-11-12

## 2024-11-12 DIAGNOSIS — Z12.31 VISIT FOR SCREENING MAMMOGRAM: Primary | ICD-10-CM

## 2024-11-19 ENCOUNTER — HOSPITAL ENCOUNTER (OUTPATIENT)
Dept: MAMMOGRAPHY | Age: 54
Discharge: HOME | End: 2024-11-21
Payer: COMMERCIAL

## 2024-11-19 VITALS — BODY MASS INDEX: 31.48 KG/M2 | WEIGHT: 201 LBS

## 2024-11-19 DIAGNOSIS — Z12.31 VISIT FOR SCREENING MAMMOGRAM: ICD-10-CM

## 2024-11-19 PROCEDURE — 77063 BREAST TOMOSYNTHESIS BI: CPT

## 2024-12-05 ENCOUNTER — PATIENT MESSAGE (OUTPATIENT)
Dept: FAMILY MEDICINE CLINIC | Age: 54
End: 2024-12-05

## 2024-12-06 RX ORDER — TIRZEPATIDE 2.5 MG/.5ML
2.5 INJECTION, SOLUTION SUBCUTANEOUS WEEKLY
Qty: 2 ML | Refills: 1 | Status: SHIPPED | OUTPATIENT
Start: 2024-12-06

## 2025-01-09 ENCOUNTER — OFFICE VISIT (OUTPATIENT)
Dept: PRIMARY CARE CLINIC | Age: 55
End: 2025-01-09
Payer: COMMERCIAL

## 2025-01-09 VITALS
TEMPERATURE: 98 F | DIASTOLIC BLOOD PRESSURE: 78 MMHG | HEIGHT: 67 IN | BODY MASS INDEX: 32.74 KG/M2 | HEART RATE: 65 BPM | OXYGEN SATURATION: 97 % | SYSTOLIC BLOOD PRESSURE: 112 MMHG | WEIGHT: 208.6 LBS

## 2025-01-09 DIAGNOSIS — J01.40 ACUTE NON-RECURRENT PANSINUSITIS: Primary | ICD-10-CM

## 2025-01-09 DIAGNOSIS — H65.91 RIGHT NON-SUPPURATIVE OTITIS MEDIA: ICD-10-CM

## 2025-01-09 DIAGNOSIS — L30.9 DERMATITIS: ICD-10-CM

## 2025-01-09 PROCEDURE — 99213 OFFICE O/P EST LOW 20 MIN: CPT

## 2025-01-09 RX ORDER — TRIAMCINOLONE ACETONIDE 1 MG/G
CREAM TOPICAL
Qty: 80 G | Refills: 0 | Status: SHIPPED | OUTPATIENT
Start: 2025-01-09

## 2025-01-09 ASSESSMENT — ENCOUNTER SYMPTOMS
WHEEZING: 0
NAUSEA: 0
DIARRHEA: 0
RHINORRHEA: 0
SHORTNESS OF BREATH: 1
SINUS PAIN: 1
ABDOMINAL PAIN: 0
COUGH: 1
VOMITING: 0
SORE THROAT: 1

## 2025-01-09 NOTE — PROGRESS NOTES
diarrhea, nausea and vomiting.   Neurological:  Positive for headaches.       Objective:     Vitals:    01/09/25 1135   BP: 112/78   Site: Right Upper Arm   Position: Sitting   Cuff Size: Medium Adult   Pulse: 65   Temp: 98 °F (36.7 °C)   TempSrc: Tympanic   SpO2: 97%   Weight: 94.6 kg (208 lb 9.6 oz)   Height: 1.702 m (5' 7\")     Body mass index is 32.67 kg/m².    Physical Exam  Vitals and nursing note reviewed.   Constitutional:       Appearance: Normal appearance. She is not ill-appearing.   HENT:      Head: Normocephalic and atraumatic.      Right Ear: Ear canal and external ear normal. Tympanic membrane is erythematous and bulging.      Left Ear: Tympanic membrane, ear canal and external ear normal.      Ears:      Comments: Dry scaling skin in the bilat ears     Nose: Congestion present.      Right Sinus: Maxillary sinus tenderness and frontal sinus tenderness present.      Left Sinus: Maxillary sinus tenderness and frontal sinus tenderness present.      Mouth/Throat:      Mouth: Mucous membranes are moist.      Pharynx: No oropharyngeal exudate or posterior oropharyngeal erythema.   Eyes:      Conjunctiva/sclera: Conjunctivae normal.   Cardiovascular:      Rate and Rhythm: Normal rate and regular rhythm.      Heart sounds: Normal heart sounds. No murmur heard.     No friction rub. No gallop.   Pulmonary:      Effort: Pulmonary effort is normal.      Breath sounds: Normal breath sounds. No wheezing or rhonchi.   Musculoskeletal:      Cervical back: Neck supple. Tenderness present.   Lymphadenopathy:      Cervical: Cervical adenopathy present.   Skin:     General: Skin is warm.      Findings: No rash.   Neurological:      General: No focal deficit present.      Mental Status: She is alert.   Psychiatric:         Mood and Affect: Mood normal.         Behavior: Behavior normal.         Thought Content: Thought content normal.           Assessment and Plan        Diagnosis Orders   1. Acute non-recurrent

## 2025-01-09 NOTE — PATIENT INSTRUCTIONS
Complete full course of antibiotics  Continue with mucinex and nasal sprays  May use a salvador pot or saline rinses as tolerated  May use tylenol for headaches  Increase water intake  May continue cream for itch  Take allergy medication daily (zyrtec)  If symptoms worsen, SOB, difficulty swallowing seek medical treatment  Patient verbalized understanding and agrees with plan of care

## 2025-01-24 ENCOUNTER — OFFICE VISIT (OUTPATIENT)
Dept: FAMILY MEDICINE CLINIC | Age: 55
End: 2025-01-24

## 2025-01-24 VITALS
WEIGHT: 208 LBS | OXYGEN SATURATION: 96 % | BODY MASS INDEX: 32.65 KG/M2 | SYSTOLIC BLOOD PRESSURE: 126 MMHG | DIASTOLIC BLOOD PRESSURE: 62 MMHG | HEART RATE: 56 BPM | HEIGHT: 67 IN

## 2025-01-24 DIAGNOSIS — M54.2 NECK PAIN ON LEFT SIDE: Primary | ICD-10-CM

## 2025-01-24 SDOH — ECONOMIC STABILITY: FOOD INSECURITY: WITHIN THE PAST 12 MONTHS, THE FOOD YOU BOUGHT JUST DIDN'T LAST AND YOU DIDN'T HAVE MONEY TO GET MORE.: NEVER TRUE

## 2025-01-24 SDOH — ECONOMIC STABILITY: FOOD INSECURITY: WITHIN THE PAST 12 MONTHS, YOU WORRIED THAT YOUR FOOD WOULD RUN OUT BEFORE YOU GOT MONEY TO BUY MORE.: NEVER TRUE

## 2025-01-24 ASSESSMENT — ENCOUNTER SYMPTOMS
SORE THROAT: 1
VOICE CHANGE: 0
TROUBLE SWALLOWING: 0
EYES NEGATIVE: 1
COUGH: 0
RESPIRATORY NEGATIVE: 1
ALLERGIC/IMMUNOLOGIC NEGATIVE: 1
GASTROINTESTINAL NEGATIVE: 1

## 2025-01-24 ASSESSMENT — PATIENT HEALTH QUESTIONNAIRE - PHQ9
SUM OF ALL RESPONSES TO PHQ QUESTIONS 1-9: 0
SUM OF ALL RESPONSES TO PHQ QUESTIONS 1-9: 0
1. LITTLE INTEREST OR PLEASURE IN DOING THINGS: NOT AT ALL
SUM OF ALL RESPONSES TO PHQ QUESTIONS 1-9: 0
SUM OF ALL RESPONSES TO PHQ9 QUESTIONS 1 & 2: 0
SUM OF ALL RESPONSES TO PHQ QUESTIONS 1-9: 0
2. FEELING DOWN, DEPRESSED OR HOPELESS: NOT AT ALL

## 2025-01-24 NOTE — PROGRESS NOTES
Subjective:      Patient ID: Kadi Snyder is a 54 y.o. female.    HPI  acute visit for prolonged left sided throat pain radiating into the left ear.  Sense of anterior left neck swelling under the mandibular angle.   Seen in urgent care 2-3 weeks ago.  Sinus/ear infection treated with augmentin for 10 days. She feels the left sided throat and ear pain preceded the sinus issues by a couple by a month or so.  Symptoms improved, but the left throat pain and ear pain persist.  Hearing grossly normal.  No fever.  No trouble with swallowing, not really painful.       Past Medical History:   Diagnosis Date    Anxiety     Breast cyst 2009    DVT of popliteal vein ([facility]) 08/2018    right leg, following knee surgery    Reflux     Ventricular tachycardia, inducible ([facility]) 01/18/2019    syncope->abnormal ekg/ischemia?->normal cath.->ep with sustained/non sustained/inducible monomorphic VT [facility]     Past Surgical History:   Procedure Laterality Date    CARDIAC DEFIBRILLATOR PLACEMENT Left 01/2019    [facility].  sustained VT, non sustained/inducible VT on EP study.     CHOLECYSTECTOMY  12/23/2010    COLONOSCOPY  06/04/2018    [facility], single polyp cecum, diverticuli , follow up 5 years.     COLONOSCOPY N/A 06/10/2020    *COLONOSCOPY performed by Harry Davey DO at [facility] OR    DILATION AND CURETTAGE OF UTERUS  1994 & 1995    ENDOMETRIAL ABLATION  ~2000    Dr. Mendoza   fibroid/bleeding    KNEE SURGERY  11/14/2016    KNEE SURGERY Right 07/02/2018    rt knee scope, scar debridement, MMD, patellofemoral ligament repair, microfracture of medial femoral condyle    LAPAROSCOPY  09/12/2012    Exploratory; lysis of adhesions.      UPPER GASTROINTESTINAL ENDOSCOPY  07/23/2012    Grade 2 esophagitis.    UPPER GASTROINTESTINAL ENDOSCOPY  12/17/2010    small hiatal hernia    UPPER GASTROINTESTINAL ENDOSCOPY N/A 06/10/2020    EGD BIOPSY performed by [facility] at [facility]     BREAST FINE NEEDLE ASPIRATION

## 2025-01-28 ENCOUNTER — PATIENT MESSAGE (OUTPATIENT)
Dept: FAMILY MEDICINE CLINIC | Age: 55
End: 2025-01-28

## 2025-01-28 DIAGNOSIS — E66.811 CLASS 1 OBESITY DUE TO EXCESS CALORIES WITHOUT SERIOUS COMORBIDITY WITH BODY MASS INDEX (BMI) OF 32.0 TO 32.9 IN ADULT: Primary | ICD-10-CM

## 2025-01-28 DIAGNOSIS — F51.01 PRIMARY INSOMNIA: ICD-10-CM

## 2025-01-28 DIAGNOSIS — E66.09 CLASS 1 OBESITY DUE TO EXCESS CALORIES WITHOUT SERIOUS COMORBIDITY WITH BODY MASS INDEX (BMI) OF 32.0 TO 32.9 IN ADULT: Primary | ICD-10-CM

## 2025-01-29 RX ORDER — ALPRAZOLAM 0.25 MG/1
0.25 TABLET ORAL NIGHTLY PRN
Qty: 30 TABLET | Refills: 0 | Status: SHIPPED | OUTPATIENT
Start: 2025-01-29 | End: 2025-02-28

## 2025-01-29 NOTE — TELEPHONE ENCOUNTER
Kadi called requesting a refill of the below medication which has been pended for you:   Per [facility], last refill 09/03 #30 for 30 days.  Requested Prescriptions     Pending Prescriptions Disp Refills    ALPRAZolam (XANAX) 0.25 MG tablet 30 tablet 0     Sig: Take 1 tablet by mouth nightly as needed for Sleep for up to 30 days.       Last Appointment Date: 1/24/2025  Next Appointment Date: 3/12/2025    Allergies   Allergen Reactions    Doxycycline     Doxycycline Calcium Nausea And Vomiting    Meloxicam Rash    Oxycodone-Acetaminophen Rash

## 2025-01-29 NOTE — TELEPHONE ENCOUNTER
Patient would like to go up to 5mg with their Zepbound. Can they have a script for that if that's okay?

## 2025-02-03 ENCOUNTER — PATIENT MESSAGE (OUTPATIENT)
Dept: FAMILY MEDICINE CLINIC | Age: 55
End: 2025-02-03

## 2025-02-03 DIAGNOSIS — M54.2 NECK PAIN ON LEFT SIDE: Primary | ICD-10-CM

## 2025-02-06 ENCOUNTER — TELEPHONE (OUTPATIENT)
Dept: CT IMAGING | Age: 55
End: 2025-02-06

## 2025-02-06 NOTE — TELEPHONE ENCOUNTER
Patient is scheduled for a CT soft tissue neck WO contrast. Protocol recommends doing scan WITH contrast unless you are ruling out stones ONLY.      Please let me know if you want to change the order to with or continue with the wo scan

## 2025-02-11 ENCOUNTER — HOSPITAL ENCOUNTER (OUTPATIENT)
Age: 55
Discharge: HOME | End: 2025-02-11
Payer: COMMERCIAL

## 2025-02-11 ENCOUNTER — HOSPITAL ENCOUNTER (OUTPATIENT)
Dept: CT IMAGING | Age: 55
Discharge: HOME | End: 2025-02-13
Attending: FAMILY MEDICINE
Payer: COMMERCIAL

## 2025-02-11 ENCOUNTER — OFFICE VISIT (OUTPATIENT)
Dept: PRIMARY CARE CLINIC | Age: 55
End: 2025-02-11
Payer: COMMERCIAL

## 2025-02-11 VITALS
WEIGHT: 202 LBS | OXYGEN SATURATION: 98 % | BODY MASS INDEX: 31.64 KG/M2 | SYSTOLIC BLOOD PRESSURE: 118 MMHG | DIASTOLIC BLOOD PRESSURE: 78 MMHG | TEMPERATURE: 97.1 F | HEART RATE: 68 BPM

## 2025-02-11 DIAGNOSIS — M54.2 NECK PAIN ON LEFT SIDE: ICD-10-CM

## 2025-02-11 DIAGNOSIS — Z00.00 HEALTHCARE MAINTENANCE: ICD-10-CM

## 2025-02-11 DIAGNOSIS — Z00.00 HEALTHCARE MAINTENANCE: Primary | ICD-10-CM

## 2025-02-11 DIAGNOSIS — B02.9 HERPES ZOSTER WITHOUT COMPLICATION: Primary | ICD-10-CM

## 2025-02-11 LAB
CREAT SERPL-MCNC: 0.9 MG/DL (ref 0.5–0.9)
GFR, ESTIMATED: 76 ML/MIN/1.73M2

## 2025-02-11 PROCEDURE — 2709999900 CT SOFT TISSUE NECK W CONTRAST

## 2025-02-11 PROCEDURE — 70450 CT HEAD/BRAIN W/O DYE: CPT

## 2025-02-11 PROCEDURE — 99213 OFFICE O/P EST LOW 20 MIN: CPT

## 2025-02-11 PROCEDURE — 36415 COLL VENOUS BLD VENIPUNCTURE: CPT

## 2025-02-11 PROCEDURE — 82565 ASSAY OF CREATININE: CPT

## 2025-02-11 PROCEDURE — 6360000004 HC RX CONTRAST MEDICATION: Performed by: FAMILY MEDICINE

## 2025-02-11 RX ORDER — VALACYCLOVIR HYDROCHLORIDE 1 G/1
1000 TABLET, FILM COATED ORAL 3 TIMES DAILY
Qty: 21 TABLET | Refills: 0 | Status: SHIPPED | OUTPATIENT
Start: 2025-02-11 | End: 2025-02-18

## 2025-02-11 RX ORDER — IOPAMIDOL 755 MG/ML
75 INJECTION, SOLUTION INTRAVASCULAR
Status: COMPLETED | OUTPATIENT
Start: 2025-02-11 | End: 2025-02-11

## 2025-02-11 RX ADMIN — IOPAMIDOL 75 ML: 755 INJECTION, SOLUTION INTRAVENOUS at 15:06

## 2025-02-11 ASSESSMENT — ENCOUNTER SYMPTOMS
COUGH: 0
SORE THROAT: 0
SHORTNESS OF BREATH: 0

## 2025-02-11 NOTE — PATIENT INSTRUCTIONS
Start antiviral as prescribed  Complete whole course   May use tylenol and ibuprofen for pain/ fever  Attached information on shingles  If symptoms worsen follow up with PCP or return to walk in clinic  Patient verbalized understanding and agrees with plan of care

## 2025-02-11 NOTE — PROGRESS NOTES
[facility] Walk In department of [facility]  [address] [facility] [address]  Phone: [phone]  Fax: [phone]      Kadi Snyder  1970  MRN: [id number]  Date of visit: 2/11/2025    Chief Complaint:     Kadi Snyder is here for c/o of Herpes Zoster (On chest neck and shoulder )      HPI:     Kadi Snyder is a 54 y.o. female who presents to the [facility] Care today for her medical conditions/complaints as noted below.    Rash  This is a new problem. The current episode started yesterday. The problem has been rapidly worsening since onset. The affected locations include the left shoulder, neck and chest. The rash is characterized by redness, pain and itchiness. She was exposed to nothing. Pertinent negatives include no cough, fatigue, fever, shortness of breath or sore throat. Past treatments include nothing. Her past medical history is significant for varicella.   Patient had pain in the areas first that the rash presented in     Past Medical History:   Diagnosis Date    Anxiety     Breast cyst 2009    DVT of popliteal vein ([facility]) 08/2018    right leg, following knee surgery    Reflux     Ventricular tachycardia, inducible ([facility]) 01/18/2019    syncope->abnormal ekg/ischemia?->normal cath.->ep with sustained/non sustained/inducible monomorphic VT [facility]        Allergies   Allergen Reactions    Doxycycline     Doxycycline Calcium Nausea And Vomiting    Meloxicam Rash    Oxycodone-Acetaminophen Rash         Subjective:      Review of Systems   Constitutional:  Negative for fatigue and fever.   HENT:  Negative for sore throat.    Respiratory:  Negative for cough and shortness of breath.    Skin:  Positive for rash.       Objective:     Vitals:    02/11/25 1554   BP: 118/78   Pulse: 68   Temp: 97.1 °F (36.2 °C)   SpO2: 98%   Weight: 91.6 kg (202 lb)     Body mass index is 31.64 kg/m².    Physical Exam  Vitals and nursing note reviewed.

## 2025-02-13 DIAGNOSIS — B02.29 OTHER POSTHERPETIC NERVOUS SYSTEM INVOLVEMENT: Primary | ICD-10-CM

## 2025-02-13 RX ORDER — LIDOCAINE 50 MG/G
1 PATCH TOPICAL DAILY
Qty: 30 PATCH | Refills: 0 | Status: SHIPPED | OUTPATIENT
Start: 2025-02-13 | End: 2025-03-15

## 2025-02-20 ENCOUNTER — OFFICE VISIT (OUTPATIENT)
Dept: PRIMARY CARE CLINIC | Age: 55
End: 2025-02-20

## 2025-02-20 VITALS
WEIGHT: 202 LBS | BODY MASS INDEX: 31.64 KG/M2 | OXYGEN SATURATION: 98 % | TEMPERATURE: 99.5 F | SYSTOLIC BLOOD PRESSURE: 126 MMHG | HEART RATE: 74 BPM | DIASTOLIC BLOOD PRESSURE: 74 MMHG

## 2025-02-20 DIAGNOSIS — R50.9 FEVER, UNSPECIFIED FEVER CAUSE: Primary | ICD-10-CM

## 2025-02-20 DIAGNOSIS — U07.1 COVID-19 VIRUS INFECTION: ICD-10-CM

## 2025-02-20 LAB
INFLUENZA A ANTIGEN, POC: NEGATIVE
INFLUENZA B ANTIGEN, POC: NEGATIVE
LOT EXPIRE DATE: ABNORMAL
LOT KIT NUMBER: ABNORMAL
SARS-COV-2, POC: DETECTED
VALID INTERNAL CONTROL: ABNORMAL
VENDOR AND KIT NAME POC: ABNORMAL

## 2025-02-20 RX ORDER — NIRMATRELVIR AND RITONAVIR 150-100 MG
KIT ORAL
Qty: 20 TABLET | Refills: 0 | Status: SHIPPED | OUTPATIENT
Start: 2025-02-20 | End: 2025-02-25

## 2025-02-20 ASSESSMENT — ENCOUNTER SYMPTOMS
ALLERGIC/IMMUNOLOGIC NEGATIVE: 1
GASTROINTESTINAL NEGATIVE: 1
RHINORRHEA: 1
SORE THROAT: 1
EYES NEGATIVE: 1
COUGH: 1

## 2025-02-20 ASSESSMENT — PATIENT HEALTH QUESTIONNAIRE - PHQ9
SUM OF ALL RESPONSES TO PHQ9 QUESTIONS 1 & 2: 0
SUM OF ALL RESPONSES TO PHQ QUESTIONS 1-9: 0
1. LITTLE INTEREST OR PLEASURE IN DOING THINGS: NOT AT ALL
SUM OF ALL RESPONSES TO PHQ QUESTIONS 1-9: 0
2. FEELING DOWN, DEPRESSED OR HOPELESS: NOT AT ALL

## 2025-02-20 NOTE — PROGRESS NOTES
Subjective:      Patient ID: Kadi Snyder is a 54 y.o. female.    HPI  acute visit for sore throat starting yesterday.  Feeling nasal congestion today.  She had a fever of 102.7 at home this am.  Took tylenol at 5 am.  Starting to have a cough.  Voice a little off.      Past Medical History:   Diagnosis Date    Anxiety     Breast cyst 2009    DVT of popliteal vein (HCC) 08/2018    right leg, following knee surgery    Reflux     Ventricular tachycardia, inducible ([facility]) 01/18/2019    syncope->abnormal ekg/ischemia?->normal cath.->ep with sustained/non sustained/inducible monomorphic VT [facility]     Past Surgical History:   Procedure Laterality Date    CARDIAC DEFIBRILLATOR PLACEMENT Left 01/2019    [facility].  sustained VT, non sustained/inducible VT on EP study.     CHOLECYSTECTOMY  12/23/2010    COLONOSCOPY  06/04/2018    [facility]. [facility], single polyp cecum, diverticuli , follow up 5 years.     COLONOSCOPY N/A 06/10/2020    *COLONOSCOPY performed by Harry Davey DO at [facility] OR    DILATION AND CURETTAGE OF UTERUS  1994 & 1995    ENDOMETRIAL ABLATION  ~2000    Dr. Mendoza   fibroid/bleeding    KNEE SURGERY  11/14/2016    KNEE SURGERY Right 07/02/2018    rt knee scope, scar debridement, MMD, patellofemoral ligament repair, microfracture of medial femoral condyle    LAPAROSCOPY  09/12/2012    Exploratory; lysis of adhesions.      UPPER GASTROINTESTINAL ENDOSCOPY  07/23/2012    Grade 2 esophagitis.    UPPER GASTROINTESTINAL ENDOSCOPY  12/17/2010    small hiatal hernia    UPPER GASTROINTESTINAL ENDOSCOPY N/A 06/10/2020    EGD BIOPSY performed by Harry Davey DO at [facility] OR     BREAST FINE NEEDLE ASPIRATION  2012    VENOUS ABLATION Left 08/2023    Left neck venous ablation     Current Outpatient Medications   Medication Sig Dispense Refill    lidocaine (LIDODERM) 5 % Place 1 patch onto the skin daily 12 hours on, 12 hours off. 30 patch 0    ALPRAZolam (XANAX) 0.25 MG tablet Take 1 tablet by mouth

## 2025-03-11 RX ORDER — SERTRALINE HYDROCHLORIDE 25 MG/1
25 TABLET, FILM COATED ORAL DAILY
Qty: 90 TABLET | Refills: 0 | Status: SHIPPED | OUTPATIENT
Start: 2025-03-11

## 2025-03-11 NOTE — TELEPHONE ENCOUNTER
Kadi called requesting a refill of the below medication which has been pended for you:     Requested Prescriptions     Pending Prescriptions Disp Refills    sertraline (ZOLOFT) 25 MG tablet 90 tablet 0     Sig: Take 1 tablet by mouth daily       Last Appointment Date: 2/20/2025  Next Appointment Date: 3/18/2025    Allergies   Allergen Reactions    Doxycycline     Doxycycline Calcium Nausea And Vomiting    Meloxicam Rash    Oxycodone-Acetaminophen Rash

## 2025-03-17 ENCOUNTER — RESULTS FOLLOW-UP (OUTPATIENT)
Dept: CT IMAGING | Age: 55
End: 2025-03-17

## 2025-03-18 ENCOUNTER — OFFICE VISIT (OUTPATIENT)
Dept: FAMILY MEDICINE CLINIC | Age: 55
End: 2025-03-18
Payer: COMMERCIAL

## 2025-03-18 VITALS
WEIGHT: 199.6 LBS | HEART RATE: 52 BPM | OXYGEN SATURATION: 98 % | SYSTOLIC BLOOD PRESSURE: 110 MMHG | DIASTOLIC BLOOD PRESSURE: 72 MMHG | HEIGHT: 67 IN | BODY MASS INDEX: 31.33 KG/M2

## 2025-03-18 DIAGNOSIS — K58.2 IRRITABLE BOWEL SYNDROME WITH BOTH CONSTIPATION AND DIARRHEA: ICD-10-CM

## 2025-03-18 DIAGNOSIS — Z13.220 LIPID SCREENING: ICD-10-CM

## 2025-03-18 DIAGNOSIS — I47.29 VENTRICULAR TACHYCARDIA, INDUCIBLE (HCC): ICD-10-CM

## 2025-03-18 DIAGNOSIS — F41.9 ANXIETY: ICD-10-CM

## 2025-03-18 DIAGNOSIS — I82.431 ACUTE DEEP VEIN THROMBOSIS (DVT) OF POPLITEAL VEIN OF RIGHT LOWER EXTREMITY: ICD-10-CM

## 2025-03-18 DIAGNOSIS — K21.9 GASTROESOPHAGEAL REFLUX DISEASE WITHOUT ESOPHAGITIS: Primary | ICD-10-CM

## 2025-03-18 PROCEDURE — 99214 OFFICE O/P EST MOD 30 MIN: CPT | Performed by: FAMILY MEDICINE

## 2025-03-18 RX ORDER — CETIRIZINE HYDROCHLORIDE 10 MG/1
10 TABLET ORAL DAILY
Qty: 30 TABLET | Refills: 2 | Status: SHIPPED | OUTPATIENT
Start: 2025-03-18

## 2025-03-18 RX ORDER — PREGABALIN 25 MG/1
CAPSULE ORAL
COMMUNITY
Start: 2025-03-14

## 2025-03-18 ASSESSMENT — ENCOUNTER SYMPTOMS
DIARRHEA: 1
VOMITING: 0
EYES NEGATIVE: 1
RESPIRATORY NEGATIVE: 1
ALLERGIC/IMMUNOLOGIC NEGATIVE: 1
ABDOMINAL PAIN: 0
ANAL BLEEDING: 0
NAUSEA: 0
CONSTIPATION: 1

## 2025-03-18 NOTE — PROGRESS NOTES
into the skin every 7 days 2 mL 2    CETIRIZINE HCL PO       TRAMADOL HCL PO       triamcinolone (KENALOG) 0.1 % cream Apply topically 2 times daily. 80 g 0    ondansetron (ZOFRAN-ODT) 4 MG disintegrating tablet Take 1 tablet by mouth 3 times daily as needed for Nausea or Vomiting 21 tablet 0    clotrimazole-betamethasone (LOTRISONE) 1-0.05 % cream APPLY TO AFFECTED AREA(S) TWO TIMES A DAY 45 g 0    metoprolol succinate (TOPROL XL) 50 MG extended release tablet Take 1 tablet by mouth daily      triamcinolone (NASACORT) 55 MCG/ACT nasal inhaler 2 sprays by Each Nostril route daily 1 each 0    Alum Hydroxide-Mag Carbonate (GAVISCON PO) Take 2 tablets by mouth nightly.      omeprazole (PRILOSEC) 20 MG delayed release capsule Take 1 capsule by mouth Daily (Patient not taking: Reported on 3/18/2025)       No current facility-administered medications for this visit.       Review of Systems   Constitutional:  Positive for unexpected weight change (up).   HENT: Negative.     Eyes: Negative.    Respiratory: Negative.     Cardiovascular: Negative.    Gastrointestinal:  Positive for constipation and diarrhea. Negative for abdominal pain, anal bleeding, nausea and vomiting.   Endocrine: Negative.    Genitourinary: Negative.    Musculoskeletal:  Positive for neck pain and neck stiffness.   Skin: Negative.    Allergic/Immunologic: Negative.    Neurological: Negative.    Hematological: Negative.    Psychiatric/Behavioral: Negative.         Objective:   Physical Exam  Constitutional:       General: She is not in acute distress.     Appearance: She is well-developed.   HENT:      Head: Normocephalic and atraumatic.      Right Ear: External ear normal.      Left Ear: External ear normal.      Mouth/Throat:      Pharynx: No oropharyngeal exudate.   Eyes:      General: No scleral icterus.     Conjunctiva/sclera: Conjunctivae normal.   Neck:      Thyroid: No thyromegaly.   Cardiovascular:      Rate and Rhythm: Normal rate and regular

## 2025-03-28 RX ORDER — ONDANSETRON 4 MG/1
4 TABLET, ORALLY DISINTEGRATING ORAL 3 TIMES DAILY PRN
Qty: 21 TABLET | Refills: 0 | Status: SHIPPED | OUTPATIENT
Start: 2025-03-28

## 2025-03-28 NOTE — TELEPHONE ENCOUNTER
Kadi called requesting a refill of the below medication which has been pended for you:     Requested Prescriptions     Pending Prescriptions Disp Refills    ondansetron (ZOFRAN-ODT) 4 MG disintegrating tablet 21 tablet 0     Sig: Take 1 tablet by mouth 3 times daily as needed for Nausea or Vomiting       Last Appointment Date: 3/18/2025  Next Appointment Date: 9/24/2025

## 2025-04-25 ENCOUNTER — PATIENT MESSAGE (OUTPATIENT)
Dept: FAMILY MEDICINE CLINIC | Age: 55
End: 2025-04-25

## 2025-04-25 DIAGNOSIS — Z13.21 ENCOUNTER FOR VITAMIN DEFICIENCY SCREENING: Primary | ICD-10-CM

## 2025-04-28 ENCOUNTER — HOSPITAL ENCOUNTER (OUTPATIENT)
Age: 55
Discharge: HOME | End: 2025-04-28
Payer: COMMERCIAL

## 2025-04-28 DIAGNOSIS — Z13.220 LIPID SCREENING: ICD-10-CM

## 2025-04-28 DIAGNOSIS — Z13.21 ENCOUNTER FOR VITAMIN DEFICIENCY SCREENING: ICD-10-CM

## 2025-04-28 DIAGNOSIS — K21.9 GASTROESOPHAGEAL REFLUX DISEASE WITHOUT ESOPHAGITIS: ICD-10-CM

## 2025-04-28 LAB
25(OH)D3 SERPL-MCNC: 40.1 NG/ML (ref 30–100)
ALBUMIN SERPL-MCNC: 4.5 G/DL (ref 3.5–5.2)
ALBUMIN/GLOB SERPL: 1.7 {RATIO} (ref 1–2.5)
ALP SERPL-CCNC: 57 U/L (ref 35–104)
ALT SERPL-CCNC: 33 U/L (ref 10–35)
ANION GAP SERPL CALCULATED.3IONS-SCNC: 14 MMOL/L (ref 9–16)
AST SERPL-CCNC: 19 U/L (ref 10–35)
BASOPHILS # BLD: 0.04 K/UL (ref 0–0.2)
BASOPHILS NFR BLD: 1 % (ref 0–2)
BILIRUB SERPL-MCNC: 0.4 MG/DL (ref 0–1.2)
BUN SERPL-MCNC: 9 MG/DL (ref 6–20)
BUN/CREAT SERPL: 10 (ref 9–20)
CALCIUM SERPL-MCNC: 9.9 MG/DL (ref 8.6–10.4)
CHLORIDE SERPL-SCNC: 104 MMOL/L (ref 98–107)
CHOLEST SERPL-MCNC: 224 MG/DL (ref 0–199)
CHOLESTEROL/HDL RATIO: 4.6
CO2 SERPL-SCNC: 25 MMOL/L (ref 20–31)
CREAT SERPL-MCNC: 0.9 MG/DL (ref 0.6–0.9)
EOSINOPHIL # BLD: 0.23 K/UL (ref 0–0.44)
EOSINOPHILS RELATIVE PERCENT: 4 % (ref 1–4)
ERYTHROCYTE [DISTWIDTH] IN BLOOD BY AUTOMATED COUNT: 13.2 % (ref 11.8–14.4)
GFR, ESTIMATED: 76 ML/MIN/1.73M2
GLUCOSE SERPL-MCNC: 103 MG/DL (ref 74–99)
HCT VFR BLD AUTO: 44.1 % (ref 36.3–47.1)
HDLC SERPL-MCNC: 49 MG/DL
HGB BLD-MCNC: 14.9 G/DL (ref 11.9–15.1)
IMM GRANULOCYTES # BLD AUTO: 0.05 K/UL (ref 0–0.3)
IMM GRANULOCYTES NFR BLD: 1 %
LDLC SERPL CALC-MCNC: 141 MG/DL (ref 0–100)
LYMPHOCYTES NFR BLD: 2.02 K/UL (ref 1.1–3.7)
LYMPHOCYTES RELATIVE PERCENT: 31 % (ref 24–43)
MCH RBC QN AUTO: 30.3 PG (ref 25.2–33.5)
MCHC RBC AUTO-ENTMCNC: 33.8 G/DL (ref 25.2–33.5)
MCV RBC AUTO: 89.6 FL (ref 82.6–102.9)
MONOCYTES NFR BLD: 0.38 K/UL (ref 0.1–1.2)
MONOCYTES NFR BLD: 6 % (ref 3–12)
NEUTROPHILS NFR BLD: 57 % (ref 36–65)
NEUTS SEG NFR BLD: 3.8 K/UL (ref 1.5–8.1)
NRBC BLD-RTO: 0 PER 100 WBC
PLATELET # BLD AUTO: 304 K/UL (ref 138–453)
PMV BLD AUTO: 10.3 FL (ref 8.1–13.5)
POTASSIUM SERPL-SCNC: 4.6 MMOL/L (ref 3.7–5.3)
PROT SERPL-MCNC: 7.1 G/DL (ref 6.6–8.7)
RBC # BLD AUTO: 4.92 M/UL (ref 3.95–5.11)
SODIUM SERPL-SCNC: 143 MMOL/L (ref 136–145)
TRIGL SERPL-MCNC: 170 MG/DL
VIT B12 SERPL-MCNC: 393 PG/ML (ref 232–1245)
VLDLC SERPL CALC-MCNC: 34 MG/DL (ref 1–30)
WBC OTHER # BLD: 6.5 K/UL (ref 3.5–11.3)

## 2025-04-28 PROCEDURE — 82607 VITAMIN B-12: CPT

## 2025-04-28 PROCEDURE — 80061 LIPID PANEL: CPT

## 2025-04-28 PROCEDURE — 85025 COMPLETE CBC W/AUTO DIFF WBC: CPT

## 2025-04-28 PROCEDURE — 80053 COMPREHEN METABOLIC PANEL: CPT

## 2025-04-28 PROCEDURE — 36415 COLL VENOUS BLD VENIPUNCTURE: CPT

## 2025-04-28 PROCEDURE — 82306 VITAMIN D 25 HYDROXY: CPT

## 2025-04-29 ENCOUNTER — RESULTS FOLLOW-UP (OUTPATIENT)
Dept: FAMILY MEDICINE CLINIC | Age: 55
End: 2025-04-29

## 2025-04-29 RX ORDER — BUPROPION HYDROCHLORIDE 150 MG/1
150 TABLET ORAL EVERY MORNING
Qty: 30 TABLET | Refills: 3 | Status: SHIPPED | OUTPATIENT
Start: 2025-04-29

## 2025-05-08 ENCOUNTER — PATIENT MESSAGE (OUTPATIENT)
Dept: FAMILY MEDICINE CLINIC | Age: 55
End: 2025-05-08

## 2025-05-09 DIAGNOSIS — E66.09 CLASS 1 OBESITY DUE TO EXCESS CALORIES WITHOUT SERIOUS COMORBIDITY WITH BODY MASS INDEX (BMI) OF 32.0 TO 32.9 IN ADULT: Primary | ICD-10-CM

## 2025-05-09 DIAGNOSIS — E66.811 CLASS 1 OBESITY DUE TO EXCESS CALORIES WITHOUT SERIOUS COMORBIDITY WITH BODY MASS INDEX (BMI) OF 32.0 TO 32.9 IN ADULT: Primary | ICD-10-CM

## 2025-05-09 NOTE — TELEPHONE ENCOUNTER
Kadi called requesting a refill of the below medication which has been pended for you:   Patient would like to go up in dose  Requested Prescriptions     Pending Prescriptions Disp Refills    tirzepatide-weight management (ZEPBOUND) 7.5 MG/0.5ML SOAJ subCUTAneous auto-injector pen 2 mL 0     Sig: Inject 7.5 mg into the skin every 7 days       Last Appointment Date: 3/18/2025  Next Appointment Date: 9/24/2025    Allergies   Allergen Reactions    Doxycycline     Doxycycline Calcium Nausea And Vomiting    Meloxicam Rash    Oxycodone-Acetaminophen Rash

## 2025-05-11 DIAGNOSIS — E66.09 CLASS 1 OBESITY DUE TO EXCESS CALORIES WITHOUT SERIOUS COMORBIDITY WITH BODY MASS INDEX (BMI) OF 32.0 TO 32.9 IN ADULT: ICD-10-CM

## 2025-05-11 DIAGNOSIS — E66.811 CLASS 1 OBESITY DUE TO EXCESS CALORIES WITHOUT SERIOUS COMORBIDITY WITH BODY MASS INDEX (BMI) OF 32.0 TO 32.9 IN ADULT: ICD-10-CM

## 2025-05-12 RX ORDER — TIRZEPATIDE 5 MG/.5ML
INJECTION, SOLUTION SUBCUTANEOUS
Qty: 2 ML | Refills: 2 | OUTPATIENT
Start: 2025-05-12

## 2025-06-02 RX ORDER — METRONIDAZOLE 7.5 MG/G
GEL VAGINAL
Qty: 70 G | Refills: 0 | Status: SHIPPED | OUTPATIENT
Start: 2025-06-02

## 2025-06-02 RX ORDER — FLUCONAZOLE 150 MG/1
150 TABLET ORAL
Qty: 2 TABLET | Refills: 0 | Status: SHIPPED | OUTPATIENT
Start: 2025-06-02 | End: 2025-06-08

## 2025-06-05 ENCOUNTER — HOSPITAL ENCOUNTER (OUTPATIENT)
Age: 55
Setting detail: SPECIMEN
Discharge: HOME | End: 2025-06-05
Payer: COMMERCIAL

## 2025-06-05 ENCOUNTER — OFFICE VISIT (OUTPATIENT)
Dept: FAMILY MEDICINE CLINIC | Age: 55
End: 2025-06-05
Payer: COMMERCIAL

## 2025-06-05 VITALS
SYSTOLIC BLOOD PRESSURE: 122 MMHG | HEIGHT: 67 IN | BODY MASS INDEX: 29.76 KG/M2 | DIASTOLIC BLOOD PRESSURE: 72 MMHG | OXYGEN SATURATION: 97 % | WEIGHT: 189.6 LBS | HEART RATE: 59 BPM

## 2025-06-05 DIAGNOSIS — R10.30 LOWER ABDOMINAL PAIN: ICD-10-CM

## 2025-06-05 DIAGNOSIS — R10.30 LOWER ABDOMINAL PAIN: Primary | ICD-10-CM

## 2025-06-05 LAB
BILIRUB UR QL STRIP: NEGATIVE
CANDIDA SPECIES: NEGATIVE
CLARITY UR: CLEAR
COLOR UR: YELLOW
COMMENT: ABNORMAL
GARDNERELLA VAGINALIS: NEGATIVE
GLUCOSE UR STRIP-MCNC: NEGATIVE MG/DL
HGB UR QL STRIP.AUTO: NEGATIVE
KETONES UR STRIP-MCNC: NEGATIVE MG/DL
LEUKOCYTE ESTERASE UR QL STRIP: NEGATIVE
NITRITE UR QL STRIP: NEGATIVE
PH UR STRIP: 7 [PH] (ref 5–6)
PROT UR STRIP-MCNC: NEGATIVE MG/DL
SOURCE: NORMAL
SP GR UR STRIP: 1.01 (ref 1.01–1.02)
TRICHOMONAS: NEGATIVE
UROBILINOGEN UR STRIP-ACNC: NORMAL EU/DL (ref 0–1)

## 2025-06-05 PROCEDURE — 87660 TRICHOMONAS VAGIN DIR PROBE: CPT

## 2025-06-05 PROCEDURE — 87510 GARDNER VAG DNA DIR PROBE: CPT

## 2025-06-05 PROCEDURE — 81003 URINALYSIS AUTO W/O SCOPE: CPT

## 2025-06-05 PROCEDURE — 87480 CANDIDA DNA DIR PROBE: CPT

## 2025-06-05 PROCEDURE — 87070 CULTURE OTHR SPECIMN AEROBIC: CPT

## 2025-06-05 PROCEDURE — 87480 CANDIDA DNA DIR PROBE: CPT | Performed by: FAMILY MEDICINE

## 2025-06-05 PROCEDURE — 99213 OFFICE O/P EST LOW 20 MIN: CPT | Performed by: FAMILY MEDICINE

## 2025-06-05 RX ORDER — METRONIDAZOLE 500 MG/1
500 TABLET ORAL 2 TIMES DAILY
Qty: 14 TABLET | Refills: 0 | Status: SHIPPED | OUTPATIENT
Start: 2025-06-05 | End: 2025-06-12

## 2025-06-05 RX ORDER — FLUCONAZOLE 150 MG/1
150 TABLET ORAL
Qty: 2 TABLET | Refills: 0 | Status: SHIPPED | OUTPATIENT
Start: 2025-06-05 | End: 2025-06-11

## 2025-06-05 RX ORDER — TRAMADOL HYDROCHLORIDE 50 MG/1
50 TABLET ORAL PRN
COMMUNITY
Start: 2025-04-30

## 2025-06-05 RX ORDER — SERTRALINE HYDROCHLORIDE 25 MG/1
25 TABLET, FILM COATED ORAL DAILY
Qty: 90 TABLET | Refills: 1 | Status: SHIPPED | OUTPATIENT
Start: 2025-06-05

## 2025-06-05 NOTE — PROGRESS NOTES
Subjective:      Patient ID: Kadi Snyder is a 55 y.o. female.    HPI  acute visit for pelvic discomfort in the last 2 days.  Having some vaginitis symptoms after vacation.  Some odor, itching, mild discharge. She has started metrogel vaginal product for 4 days, those symptoms improved.  Now with 2 days of inflammation across the supra pubic area.  No increased frequency, no dysuria.  Some of the pain going around the back.  S/p ablation 15 yrs ago.  In menopause. No constipation or diarrhea.  No fever.  Mild nausea, on zepbound.      Past Medical History:   Diagnosis Date    Anxiety     Breast cyst 2009    DVT of popliteal vein (HCC) 08/2018    right leg, following knee surgery    Reflux     Ventricular tachycardia, inducible ([facility]) 01/18/2019    syncope->abnormal ekg/ischemia?->normal cath.->ep with sustained/non sustained/inducible monomorphic VT [facility]     Past Surgical History:   Procedure Laterality Date    CARDIAC DEFIBRILLATOR PLACEMENT Left 01/2019    [facility].  sustained VT, non sustained/inducible VT on EP study.     CHOLECYSTECTOMY  12/23/2010    COLONOSCOPY  06/04/2018    [facility], single polyp cecum, diverticuli , follow up 5 years.     COLONOSCOPY N/A 06/10/2020    *COLONOSCOPY performed by Harry Davey DO at [facility] OR    DILATION AND CURETTAGE OF UTERUS  1994 & 1995    ENDOMETRIAL ABLATION  ~2000    Dr. Mendoza   fibroid/bleeding    KNEE SURGERY  11/14/2016    KNEE SURGERY Right 07/02/2018    rt knee scope, scar debridement, MMD, patellofemoral ligament repair, microfracture of medial femoral condyle    LAPAROSCOPY  09/12/2012    Exploratory; lysis of adhesions.      UPPER GASTROINTESTINAL ENDOSCOPY  07/23/2012    Grade 2 esophagitis.    UPPER GASTROINTESTINAL ENDOSCOPY  12/17/2010    small hiatal hernia    UPPER GASTROINTESTINAL ENDOSCOPY N/A 06/10/2020    EGD BIOPSY performed by [facility]y DO at [facility]     BREAST FINE NEEDLE ASPIRATION  2012    VENOUS ABLATION

## 2025-06-05 NOTE — TELEPHONE ENCOUNTER
Kadi called requesting a refill of the below medication which has been pended for you:     Requested Prescriptions     Pending Prescriptions Disp Refills    sertraline (ZOLOFT) 25 MG tablet 90 tablet 1     Sig: Take 1 tablet by mouth daily       Last Appointment Date: 3/18/2025  Next Appointment Date: 6/5/2025

## 2025-06-08 LAB
MICROORGANISM SPEC CULT: NORMAL
SERVICE CMNT-IMP: NORMAL
SPECIMEN DESCRIPTION: NORMAL

## 2025-06-10 ENCOUNTER — RESULTS FOLLOW-UP (OUTPATIENT)
Dept: FAMILY MEDICINE CLINIC | Age: 55
End: 2025-06-10

## 2025-06-10 ENCOUNTER — PATIENT MESSAGE (OUTPATIENT)
Dept: FAMILY MEDICINE CLINIC | Age: 55
End: 2025-06-10

## 2025-06-10 DIAGNOSIS — E66.09 CLASS 1 OBESITY DUE TO EXCESS CALORIES WITHOUT SERIOUS COMORBIDITY WITH BODY MASS INDEX (BMI) OF 32.0 TO 32.9 IN ADULT: ICD-10-CM

## 2025-06-10 DIAGNOSIS — E66.811 CLASS 1 OBESITY DUE TO EXCESS CALORIES WITHOUT SERIOUS COMORBIDITY WITH BODY MASS INDEX (BMI) OF 32.0 TO 32.9 IN ADULT: ICD-10-CM

## 2025-06-10 NOTE — TELEPHONE ENCOUNTER
Kadi called requesting a refill of the below medication which has been pended for you:     Requested Prescriptions     Pending Prescriptions Disp Refills    tirzepatide-weight management (ZEPBOUND) 7.5 MG/0.5ML SOAJ subCUTAneous auto-injector pen 2 mL 2     Sig: Inject 7.5 mg into the skin every 7 days       Last Appointment Date: 6/5/2025  Next Appointment Date: 9/24/2025

## 2025-06-30 RX ORDER — CETIRIZINE HYDROCHLORIDE 10 MG/1
10 TABLET ORAL DAILY
Qty: 90 TABLET | Refills: 1 | Status: SHIPPED | OUTPATIENT
Start: 2025-06-30

## 2025-06-30 NOTE — TELEPHONE ENCOUNTER
Kadi called requesting a refill of the below medication which has been pended for you:     Requested Prescriptions     Pending Prescriptions Disp Refills    cetirizine (ZYRTEC) 10 MG tablet 90 tablet 1     Sig: Take 1 tablet by mouth daily       Last Appointment Date: 6/5/2025  Next Appointment Date: 9/24/2025    Allergies   Allergen Reactions    Doxycycline     Doxycycline Calcium Nausea And Vomiting    Meloxicam Rash    Oxycodone-Acetaminophen Rash

## 2025-07-07 RX ORDER — OMEPRAZOLE 20 MG/1
20 CAPSULE, DELAYED RELEASE ORAL DAILY
Qty: 60 CAPSULE | Refills: 0 | Status: SHIPPED | OUTPATIENT
Start: 2025-07-07

## 2025-07-07 NOTE — TELEPHONE ENCOUNTER
Kadi called requesting a refill of the below medication which has been pended for you:     Requested Prescriptions     Pending Prescriptions Disp Refills    omeprazole (PRILOSEC) 20 MG delayed release capsule 60 capsule 0     Sig: Take 1 capsule by mouth Daily       Last Appointment Date: 6/5/2025  Next Appointment Date: 9/24/2025

## 2025-07-30 ENCOUNTER — HOSPITAL ENCOUNTER (OUTPATIENT)
Dept: LAB | Age: 55
Discharge: HOME | End: 2025-07-30
Payer: COMMERCIAL

## 2025-07-30 ENCOUNTER — OFFICE VISIT (OUTPATIENT)
Dept: FAMILY MEDICINE CLINIC | Age: 55
End: 2025-07-30
Payer: COMMERCIAL

## 2025-07-30 VITALS
SYSTOLIC BLOOD PRESSURE: 124 MMHG | DIASTOLIC BLOOD PRESSURE: 82 MMHG | HEIGHT: 67 IN | WEIGHT: 194.6 LBS | HEART RATE: 56 BPM | BODY MASS INDEX: 30.54 KG/M2 | OXYGEN SATURATION: 96 %

## 2025-07-30 DIAGNOSIS — R35.0 URINE FREQUENCY: Primary | ICD-10-CM

## 2025-07-30 DIAGNOSIS — R10.2 PELVIC PAIN: Primary | ICD-10-CM

## 2025-07-30 DIAGNOSIS — R35.0 URINE FREQUENCY: ICD-10-CM

## 2025-07-30 LAB
BACTERIA URNS QL MICRO: ABNORMAL
BILIRUB UR QL STRIP: NEGATIVE
CHARACTER UR: ABNORMAL
CLARITY UR: CLEAR
COLOR UR: YELLOW
EPI CELLS #/AREA URNS HPF: ABNORMAL /HPF (ref 0–5)
GLUCOSE UR STRIP-MCNC: NEGATIVE MG/DL
HGB UR QL STRIP.AUTO: ABNORMAL
KETONES UR STRIP-MCNC: NEGATIVE MG/DL
LEUKOCYTE ESTERASE UR QL STRIP: NEGATIVE
NITRITE UR QL STRIP: NEGATIVE
PH UR STRIP: 7 [PH] (ref 5–6)
PROT UR STRIP-MCNC: NEGATIVE MG/DL
RBC #/AREA URNS HPF: ABNORMAL /HPF (ref 0–4)
SP GR UR STRIP: 1.01 (ref 1.01–1.02)
UROBILINOGEN UR STRIP-ACNC: NORMAL EU/DL (ref 0–1)
WBC #/AREA URNS HPF: ABNORMAL /HPF (ref 0–4)

## 2025-07-30 PROCEDURE — 81001 URINALYSIS AUTO W/SCOPE: CPT

## 2025-07-30 PROCEDURE — 99213 OFFICE O/P EST LOW 20 MIN: CPT | Performed by: FAMILY MEDICINE

## 2025-07-30 RX ORDER — METRONIDAZOLE 7.5 MG/G
GEL VAGINAL
Qty: 70 G | Refills: 0 | Status: SHIPPED | OUTPATIENT
Start: 2025-07-30

## 2025-07-30 RX ORDER — TRIAMCINOLONE ACETONIDE 55 UG/1
2 SPRAY, METERED NASAL DAILY
Qty: 1 EACH | Refills: 0 | Status: SHIPPED | OUTPATIENT
Start: 2025-07-30

## 2025-07-30 ASSESSMENT — ENCOUNTER SYMPTOMS
EYES NEGATIVE: 1
GASTROINTESTINAL NEGATIVE: 1
RESPIRATORY NEGATIVE: 1

## 2025-07-30 NOTE — PROGRESS NOTES
Subjective:      Patient ID: Kadi Snyder is a 55 y.o. female.    HPI   Acute visit for concerns for uti.  More frequency noted overnight, some small accidents of stress incontinence , some urine odor,  mild flank pain.  No dysuria or fever.  Mild discharge.  Suprapubic discomfort.      Past Medical History:   Diagnosis Date    Anxiety     Breast cyst 2009    DVT of popliteal vein (HCC) 08/2018    right leg, following knee surgery    Reflux     Ventricular tachycardia, inducible ([facility]) 01/18/2019    syncope->abnormal ekg/ischemia?->normal cath.->ep with sustained/non sustained/inducible monomorphic VT [facility]     Past Surgical History:   Procedure Laterality Date    CARDIAC DEFIBRILLATOR PLACEMENT Left 01/2019    [facility].  sustained VT, non sustained/inducible VT on EP study.     CHOLECYSTECTOMY  12/23/2010    COLONOSCOPY  06/04/2018    [facility], single polyp cecum, diverticuli , follow up 5 years.     COLONOSCOPY N/A 06/10/2020    *COLONOSCOPY performed by Harry Davey DO at [facility]    DILATION AND CURETTAGE OF UTERUS  1994 & 1995    ENDOMETRIAL ABLATION  ~2000    Dr. Mendoza   fibroid/bleeding    KNEE SURGERY  11/14/2016    KNEE SURGERY Right 07/02/2018    rt knee scope, scar debridement, MMD, patellofemoral ligament repair, microfracture of medial femoral condyle    LAPAROSCOPY  09/12/2012    Exploratory; lysis of adhesions.      UPPER GASTROINTESTINAL ENDOSCOPY  07/23/2012    Grade 2 esophagitis.    UPPER GASTROINTESTINAL ENDOSCOPY  12/17/2010    small hiatal hernia    UPPER GASTROINTESTINAL ENDOSCOPY N/A 06/10/2020    EGD BIOPSY performed by Harry Davey DO at [facility]     BREAST FINE NEEDLE ASPIRATION  2012    VENOUS ABLATION Left 08/2023    Left neck venous ablation     Current Outpatient Medications   Medication Sig Dispense Refill    omeprazole (PRILOSEC) 20 MG delayed release capsule Take 1 capsule by mouth Daily 60 capsule 0    cetirizine (ZYRTEC) 10 MG tablet Take 1

## 2025-08-07 ENCOUNTER — HOSPITAL ENCOUNTER (OUTPATIENT)
Age: 55
Setting detail: SPECIMEN
Discharge: HOME | End: 2025-08-07
Payer: COMMERCIAL

## 2025-08-07 ENCOUNTER — OFFICE VISIT (OUTPATIENT)
Dept: FAMILY MEDICINE CLINIC | Age: 55
End: 2025-08-07
Payer: COMMERCIAL

## 2025-08-07 VITALS
OXYGEN SATURATION: 99 % | RESPIRATION RATE: 16 BRPM | HEART RATE: 55 BPM | DIASTOLIC BLOOD PRESSURE: 82 MMHG | WEIGHT: 195.6 LBS | SYSTOLIC BLOOD PRESSURE: 118 MMHG | HEIGHT: 67 IN | BODY MASS INDEX: 30.7 KG/M2

## 2025-08-07 DIAGNOSIS — N92.0 SPOTTING: ICD-10-CM

## 2025-08-07 DIAGNOSIS — N92.0 SPOTTING: Primary | ICD-10-CM

## 2025-08-07 DIAGNOSIS — R10.2 PELVIC PAIN: ICD-10-CM

## 2025-08-07 LAB
BILIRUB UR QL STRIP: NEGATIVE
CLARITY UR: CLEAR
COLOR UR: YELLOW
COMMENT: NORMAL
GLUCOSE UR STRIP-MCNC: NEGATIVE MG/DL
HGB UR QL STRIP.AUTO: NEGATIVE
KETONES UR STRIP-MCNC: NEGATIVE MG/DL
LEUKOCYTE ESTERASE UR QL STRIP: NEGATIVE
NITRITE UR QL STRIP: NEGATIVE
PH UR STRIP: 6 [PH] (ref 5–6)
PROT UR STRIP-MCNC: NEGATIVE MG/DL
SP GR UR STRIP: 1.01 (ref 1.01–1.02)
UROBILINOGEN UR STRIP-ACNC: NORMAL EU/DL (ref 0–1)

## 2025-08-07 PROCEDURE — 99214 OFFICE O/P EST MOD 30 MIN: CPT | Performed by: FAMILY MEDICINE

## 2025-08-07 PROCEDURE — 81003 URINALYSIS AUTO W/O SCOPE: CPT

## 2025-08-07 RX ORDER — OMEPRAZOLE 20 MG/1
20 CAPSULE, DELAYED RELEASE ORAL DAILY
Qty: 60 CAPSULE | Refills: 0 | Status: SHIPPED | OUTPATIENT
Start: 2025-08-07

## 2025-08-07 ASSESSMENT — ENCOUNTER SYMPTOMS
EYES NEGATIVE: 1
RESPIRATORY NEGATIVE: 1
ABDOMINAL PAIN: 1

## 2025-08-11 ENCOUNTER — PATIENT MESSAGE (OUTPATIENT)
Dept: FAMILY MEDICINE CLINIC | Age: 55
End: 2025-08-11

## 2025-08-14 ENCOUNTER — TELEPHONE (OUTPATIENT)
Dept: ULTRASOUND IMAGING | Age: 55
End: 2025-08-14

## 2025-08-14 ENCOUNTER — HOSPITAL ENCOUNTER (OUTPATIENT)
Dept: ULTRASOUND IMAGING | Age: 55
Discharge: HOME | End: 2025-08-16
Attending: FAMILY MEDICINE
Payer: COMMERCIAL

## 2025-08-14 DIAGNOSIS — N92.0 SPOTTING: ICD-10-CM

## 2025-08-14 DIAGNOSIS — R10.2 PELVIC PAIN: ICD-10-CM

## 2025-08-14 DIAGNOSIS — N92.0 SPOTTING: Primary | ICD-10-CM

## 2025-08-14 PROCEDURE — 76856 US EXAM PELVIC COMPLETE: CPT

## 2025-08-27 ENCOUNTER — HOSPITAL ENCOUNTER (OUTPATIENT)
Dept: LAB | Age: 55
Discharge: HOME | End: 2025-08-27
Payer: COMMERCIAL

## 2025-08-27 ENCOUNTER — OFFICE VISIT (OUTPATIENT)
Dept: OBGYN | Age: 55
End: 2025-08-27
Payer: COMMERCIAL

## 2025-08-27 VITALS
RESPIRATION RATE: 16 BRPM | BODY MASS INDEX: 30.7 KG/M2 | HEIGHT: 67 IN | DIASTOLIC BLOOD PRESSURE: 64 MMHG | SYSTOLIC BLOOD PRESSURE: 106 MMHG | HEART RATE: 59 BPM | WEIGHT: 195.6 LBS

## 2025-08-27 DIAGNOSIS — D25.0 SUBMUCOUS LEIOMYOMA OF UTERUS: ICD-10-CM

## 2025-08-27 DIAGNOSIS — R10.2 PELVIC PRESSURE IN FEMALE: ICD-10-CM

## 2025-08-27 DIAGNOSIS — R93.89 ENDOMETRIAL THICKENING ON ULTRASOUND: ICD-10-CM

## 2025-08-27 DIAGNOSIS — I82.431 ACUTE DEEP VEIN THROMBOSIS (DVT) OF POPLITEAL VEIN OF RIGHT LOWER EXTREMITY (HCC): ICD-10-CM

## 2025-08-27 DIAGNOSIS — N94.10 DYSPAREUNIA, FEMALE: ICD-10-CM

## 2025-08-27 DIAGNOSIS — Z98.890 HISTORY OF CONIZATION OF CERVIX: ICD-10-CM

## 2025-08-27 DIAGNOSIS — Z30.09 FAMILY PLANNING: ICD-10-CM

## 2025-08-27 DIAGNOSIS — N95.0 PMB (POSTMENOPAUSAL BLEEDING): Primary | ICD-10-CM

## 2025-08-27 DIAGNOSIS — Z98.890 HISTORY OF ENDOMETRIAL ABLATION: ICD-10-CM

## 2025-08-27 DIAGNOSIS — N95.0 PMB (POSTMENOPAUSAL BLEEDING): ICD-10-CM

## 2025-08-27 DIAGNOSIS — I47.29 OTHER VENTRICULAR TACHYCARDIA (HCC): ICD-10-CM

## 2025-08-27 LAB
B-HCG SERPL EIA 3RD IS-ACNC: 7 MIU/ML (ref 0–7)
BASOPHILS # BLD: <0.03 K/UL (ref 0–0.2)
BASOPHILS NFR BLD: 0 % (ref 0–2)
EOSINOPHIL # BLD: 0.18 K/UL (ref 0–0.44)
EOSINOPHILS RELATIVE PERCENT: 3 % (ref 1–4)
ERYTHROCYTE [DISTWIDTH] IN BLOOD BY AUTOMATED COUNT: 12.4 % (ref 11.8–14.4)
ESTRADIOL LEVEL: 9.6 PG/ML
FSH SERPL-ACNC: 105 MIU/ML
HCT VFR BLD AUTO: 44 % (ref 36.3–47.1)
HGB BLD-MCNC: 14.8 G/DL (ref 11.9–15.1)
IMM GRANULOCYTES # BLD AUTO: 0.05 K/UL (ref 0–0.3)
IMM GRANULOCYTES NFR BLD: 1 %
LYMPHOCYTES NFR BLD: 1.8 K/UL (ref 1.1–3.7)
LYMPHOCYTES RELATIVE PERCENT: 29 % (ref 24–43)
MCH RBC QN AUTO: 30.6 PG (ref 25.2–33.5)
MCHC RBC AUTO-ENTMCNC: 33.6 G/DL (ref 25.2–33.5)
MCV RBC AUTO: 91.1 FL (ref 82.6–102.9)
MONOCYTES NFR BLD: 0.35 K/UL (ref 0.1–1.2)
MONOCYTES NFR BLD: 6 % (ref 3–12)
NEUTROPHILS NFR BLD: 61 % (ref 36–65)
NEUTS SEG NFR BLD: 3.79 K/UL (ref 1.5–8.1)
NRBC BLD-RTO: 0 PER 100 WBC
PLATELET # BLD AUTO: 296 K/UL (ref 138–453)
PMV BLD AUTO: 10.1 FL (ref 8.1–13.5)
RBC # BLD AUTO: 4.83 M/UL (ref 3.95–5.11)
TSH SERPL DL<=0.05 MIU/L-ACNC: 0.48 UIU/ML (ref 0.27–4.2)
WBC OTHER # BLD: 6.2 K/UL (ref 3.5–11.3)

## 2025-08-27 PROCEDURE — 82670 ASSAY OF TOTAL ESTRADIOL: CPT

## 2025-08-27 PROCEDURE — 84443 ASSAY THYROID STIM HORMONE: CPT

## 2025-08-27 PROCEDURE — 85300 ANTITHROMBIN III ACTIVITY: CPT

## 2025-08-27 PROCEDURE — 87591 N.GONORRHOEAE DNA AMP PROB: CPT

## 2025-08-27 PROCEDURE — 85301 ANTITHROMBIN III ANTIGEN: CPT

## 2025-08-27 PROCEDURE — 85302 CLOT INHIBIT PROT C ANTIGEN: CPT

## 2025-08-27 PROCEDURE — 85610 PROTHROMBIN TIME: CPT

## 2025-08-27 PROCEDURE — 81241 F5 GENE: CPT

## 2025-08-27 PROCEDURE — 84702 CHORIONIC GONADOTROPIN TEST: CPT

## 2025-08-27 PROCEDURE — 85303 CLOT INHIBIT PROT C ACTIVITY: CPT

## 2025-08-27 PROCEDURE — 85730 THROMBOPLASTIN TIME PARTIAL: CPT

## 2025-08-27 PROCEDURE — 86147 CARDIOLIPIN ANTIBODY EA IG: CPT

## 2025-08-27 PROCEDURE — 85306 CLOT INHIBIT PROT S FREE: CPT

## 2025-08-27 PROCEDURE — 86038 ANTINUCLEAR ANTIBODIES: CPT

## 2025-08-27 PROCEDURE — 81240 F2 GENE: CPT

## 2025-08-27 PROCEDURE — 85025 COMPLETE CBC W/AUTO DIFF WBC: CPT

## 2025-08-27 PROCEDURE — 81291 MTHFR GENE: CPT

## 2025-08-27 PROCEDURE — 85613 RUSSELL VIPER VENOM DILUTED: CPT

## 2025-08-27 PROCEDURE — 36415 COLL VENOUS BLD VENIPUNCTURE: CPT

## 2025-08-27 PROCEDURE — 86225 DNA ANTIBODY NATIVE: CPT

## 2025-08-27 PROCEDURE — 86235 NUCLEAR ANTIGEN ANTIBODY: CPT

## 2025-08-27 PROCEDURE — 87491 CHLMYD TRACH DNA AMP PROBE: CPT

## 2025-08-27 PROCEDURE — 99204 OFFICE O/P NEW MOD 45 MIN: CPT | Performed by: OBSTETRICS & GYNECOLOGY

## 2025-08-27 PROCEDURE — 83001 ASSAY OF GONADOTROPIN (FSH): CPT

## 2025-08-28 LAB
ANA SER QL IA: NEGATIVE
CHLAMYDIA DNA UR QL NAA+PROBE: NEGATIVE
DSDNA IGG SER QL IA: 1.9 IU/ML
ENA SS-A IGG SER QL: <0.3 U/ML
ENA SS-B IGG SER IA-ACNC: <0.3 U/ML
N GONORRHOEA DNA UR QL NAA+PROBE: NEGATIVE
NUCLEAR IGG SER IA-RTO: <0.1 U/ML
SPECIMEN DESCRIPTION: NORMAL

## 2025-08-29 LAB
AT III AG ACT/NOR PPP IA: 115 % (ref 82–136)
CARDIOLIPIN IGA SER IA-ACNC: 2.7 APL (ref 0–14)
CARDIOLIPIN IGG SER IA-ACNC: 1.3 GPL (ref 0–10)
CARDIOLIPIN IGM SER IA-ACNC: <0.8 MPL (ref 0–10)
DILUTE RUSSELL VIPER VENOM TIME: NORMAL
INR PPP: 1
LUPUS ANTICOAG: NORMAL
PARTIAL THROMBOPLASTIN TIME: 25 SEC (ref 23.9–33.8)
PROT C AG ACT/NOR PPP IA: >95 % (ref 63–153)
PROTHROMBIN TIME: 12.8 SEC (ref 11.5–14.2)

## 2025-08-30 LAB — PROT S FREE AG ACT/NOR PPP IA: 103 % (ref 55–123)

## 2025-09-02 LAB
F2 C.20210G>A GENO BLD/T: NEGATIVE
F5 P.R506Q BLD/T QL: NEGATIVE
SPECIMEN SOURCE: NORMAL
SPECIMEN SOURCE: NORMAL

## 2025-09-03 LAB
MTHFR INTERPRETATION: NORMAL
MTHFR MUTATION A1286C: NORMAL
MTHFR MUTATION C665T: NEGATIVE
SPECIMEN: NORMAL

## (undated) DEVICE — BITE BLOCK W/VELCRO STRAP

## (undated) DEVICE — MERCY DEFIANCE ENDO KIT: Brand: MEDLINE INDUSTRIES, INC.

## (undated) DEVICE — 60 ML SYRINGE REGULAR TIP: Brand: MONOJECT

## (undated) DEVICE — FORCEPS BX L240CM JAW DIA2.4MM ORNG L CAP W/ NDL DISP RAD

## (undated) DEVICE — CANNULA NSL AD L2IN ETCO2 SAMP SFT CRUSH RESIST FEM AIRLFE

## (undated) DEVICE — 1200CC GUARDIAN II: Brand: GUARDIAN

## (undated) DEVICE — GLOVE ORANGE PI 7   MSG9070